# Patient Record
Sex: FEMALE | Race: WHITE | NOT HISPANIC OR LATINO | Employment: OTHER | URBAN - NONMETROPOLITAN AREA
[De-identification: names, ages, dates, MRNs, and addresses within clinical notes are randomized per-mention and may not be internally consistent; named-entity substitution may affect disease eponyms.]

---

## 2017-11-07 ENCOUNTER — HOSPITAL ENCOUNTER (EMERGENCY)
Facility: HOSPITAL | Age: 52
Discharge: HOME | End: 2017-11-07
Attending: EMERGENCY MEDICINE | Admitting: EMERGENCY MEDICINE

## 2017-11-07 ENCOUNTER — APPOINTMENT (OUTPATIENT)
Dept: GENERAL RADIOLOGY | Facility: HOSPITAL | Age: 52
End: 2017-11-07

## 2017-11-07 VITALS
WEIGHT: 155 LBS | RESPIRATION RATE: 16 BRPM | HEART RATE: 82 BPM | HEIGHT: 66 IN | DIASTOLIC BLOOD PRESSURE: 68 MMHG | OXYGEN SATURATION: 100 % | SYSTOLIC BLOOD PRESSURE: 116 MMHG | TEMPERATURE: 97.9 F | BODY MASS INDEX: 24.91 KG/M2

## 2017-11-07 DIAGNOSIS — R07.9 CHEST PAIN, UNSPECIFIED TYPE: Primary | ICD-10-CM

## 2017-11-07 LAB
ALBUMIN SERPL-MCNC: 4.6 G/DL (ref 3.5–5)
ALBUMIN/GLOB SERPL: 1.5 G/DL (ref 1.1–2.5)
ALP SERPL-CCNC: 88 U/L (ref 24–120)
ALT SERPL W P-5'-P-CCNC: 25 U/L (ref 0–54)
ANION GAP SERPL CALCULATED.3IONS-SCNC: 17 MMOL/L (ref 4–13)
APTT PPP: 30.3 SECONDS (ref 24.1–34.8)
AST SERPL-CCNC: 18 U/L (ref 7–45)
BASOPHILS # BLD AUTO: 0.01 10*3/MM3 (ref 0–0.2)
BASOPHILS NFR BLD AUTO: 0.1 % (ref 0–2)
BILIRUB SERPL-MCNC: 0.4 MG/DL (ref 0.1–1)
BUN BLD-MCNC: 16 MG/DL (ref 5–21)
BUN/CREAT SERPL: 21.9 (ref 7–25)
CALCIUM SPEC-SCNC: 10.2 MG/DL (ref 8.4–10.4)
CHLORIDE SERPL-SCNC: 106 MMOL/L (ref 98–110)
CK MB SERPL-CCNC: 0.23 NG/ML (ref 0–5)
CO2 SERPL-SCNC: 23 MMOL/L (ref 24–31)
CREAT BLD-MCNC: 0.73 MG/DL (ref 0.5–1.4)
DEPRECATED RDW RBC AUTO: 47.7 FL (ref 40–54)
EOSINOPHIL # BLD AUTO: 0.02 10*3/MM3 (ref 0–0.7)
EOSINOPHIL NFR BLD AUTO: 0.2 % (ref 0–4)
ERYTHROCYTE [DISTWIDTH] IN BLOOD BY AUTOMATED COUNT: 14.8 % (ref 12–15)
GFR SERPL CREATININE-BSD FRML MDRD: 84 ML/MIN/1.73
GLOBULIN UR ELPH-MCNC: 3 GM/DL
GLUCOSE BLD-MCNC: 113 MG/DL (ref 70–100)
HCT VFR BLD AUTO: 43.4 % (ref 37–47)
HGB BLD-MCNC: 14.4 G/DL (ref 12–16)
HOLD SPECIMEN: NORMAL
HOLD SPECIMEN: NORMAL
IMM GRANULOCYTES # BLD: 0.03 10*3/MM3 (ref 0–0.03)
IMM GRANULOCYTES NFR BLD: 0.3 % (ref 0–5)
INR PPP: 0.89 (ref 0.91–1.09)
LYMPHOCYTES # BLD AUTO: 1.61 10*3/MM3 (ref 0.72–4.86)
LYMPHOCYTES NFR BLD AUTO: 13.8 % (ref 15–45)
MCH RBC QN AUTO: 29.5 PG (ref 28–32)
MCHC RBC AUTO-ENTMCNC: 33.2 G/DL (ref 33–36)
MCV RBC AUTO: 88.9 FL (ref 82–98)
MONOCYTES # BLD AUTO: 0.49 10*3/MM3 (ref 0.19–1.3)
MONOCYTES NFR BLD AUTO: 4.2 % (ref 4–12)
NEUTROPHILS # BLD AUTO: 9.53 10*3/MM3 (ref 1.87–8.4)
NEUTROPHILS NFR BLD AUTO: 81.4 % (ref 39–78)
NT-PROBNP SERPL-MCNC: 52.9 PG/ML (ref 0–900)
PLATELET # BLD AUTO: 224 10*3/MM3 (ref 130–400)
PMV BLD AUTO: 12.9 FL (ref 6–12)
POTASSIUM BLD-SCNC: 3.6 MMOL/L (ref 3.5–5.3)
PROT SERPL-MCNC: 7.6 G/DL (ref 6.3–8.7)
PROTHROMBIN TIME: 12.3 SECONDS (ref 11.9–14.6)
RBC # BLD AUTO: 4.88 10*6/MM3 (ref 4.2–5.4)
SODIUM BLD-SCNC: 146 MMOL/L (ref 135–145)
TROPONIN I SERPL-MCNC: <0.012 NG/ML (ref 0–0.03)
WBC NRBC COR # BLD: 11.69 10*3/MM3 (ref 4.8–10.8)
WHOLE BLOOD HOLD SPECIMEN: NORMAL
WHOLE BLOOD HOLD SPECIMEN: NORMAL

## 2017-11-07 PROCEDURE — 85730 THROMBOPLASTIN TIME PARTIAL: CPT | Performed by: EMERGENCY MEDICINE

## 2017-11-07 PROCEDURE — 93010 ELECTROCARDIOGRAM REPORT: CPT | Performed by: INTERNAL MEDICINE

## 2017-11-07 PROCEDURE — 83880 ASSAY OF NATRIURETIC PEPTIDE: CPT | Performed by: EMERGENCY MEDICINE

## 2017-11-07 PROCEDURE — 84484 ASSAY OF TROPONIN QUANT: CPT | Performed by: EMERGENCY MEDICINE

## 2017-11-07 PROCEDURE — 85610 PROTHROMBIN TIME: CPT | Performed by: EMERGENCY MEDICINE

## 2017-11-07 PROCEDURE — 82553 CREATINE MB FRACTION: CPT | Performed by: EMERGENCY MEDICINE

## 2017-11-07 PROCEDURE — 93005 ELECTROCARDIOGRAM TRACING: CPT

## 2017-11-07 PROCEDURE — 71010 HC CHEST PA OR AP: CPT

## 2017-11-07 PROCEDURE — 80053 COMPREHEN METABOLIC PANEL: CPT | Performed by: EMERGENCY MEDICINE

## 2017-11-07 PROCEDURE — 99284 EMERGENCY DEPT VISIT MOD MDM: CPT

## 2017-11-07 PROCEDURE — 85025 COMPLETE CBC W/AUTO DIFF WBC: CPT | Performed by: EMERGENCY MEDICINE

## 2017-11-07 RX ORDER — AZITHROMYCIN 250 MG/1
250 TABLET, FILM COATED ORAL DAILY
COMMUNITY
End: 2017-11-11 | Stop reason: HOSPADM

## 2017-11-07 RX ORDER — SODIUM CHLORIDE 0.9 % (FLUSH) 0.9 %
10 SYRINGE (ML) INJECTION AS NEEDED
Status: DISCONTINUED | OUTPATIENT
Start: 2017-11-07 | End: 2017-11-07 | Stop reason: HOSPADM

## 2017-11-07 NOTE — ED PROVIDER NOTES
Subjective   HPI Comments: Complains of chest pain for the past 4-5 days.  It has waxed and waned during that time.  Is been no shortness of breath or diaphoresis or nausea and vomiting.  She felt like it was musculoskeletal due to having more work lately and bent over the desk a lot.  She also has had some coughing and congestion.  She saw her primary care physician yesterday and was treated for the cough and congestion but was told to come back Thursday and they would do a complete cardiac workup.  This had her scared and when she woke up again this morning and had some more pain in her left chest she decided she should be checked out.  She also has had some pain in her upper abdomen that she thinks is related to her normal ulcer disease.    Patient is a 52 y.o. female presenting with chest pain.   History provided by:  Patient   used: No    Chest Pain   Pain location:  L chest  Pain quality: dull    Pain radiates to:  Does not radiate  Pain severity:  Moderate  Onset quality:  Gradual  Duration:  5 days  Timing:  Intermittent  Progression:  Waxing and waning  Chronicity:  New  Context: not breathing, not drug use, not eating, not intercourse, not lifting, not movement, not raising an arm, not at rest, not stress and not trauma    Relieved by:  Nothing  Worsened by:  Nothing  Ineffective treatments:  None tried  Associated symptoms: cough    Associated symptoms: no abdominal pain, no AICD problem, no altered mental status, no anorexia, no anxiety, no back pain, no claudication, no dizziness, no dysphagia, no fatigue, no fever, no headache, no heartburn, no lower extremity edema, no nausea, no numbness, no orthopnea, no palpitations, no PND, no shortness of breath, no syncope, no vomiting and no weakness    Risk factors: smoking    Risk factors: no aortic disease, no birth control, no coronary artery disease, no diabetes mellitus, no Jessica-Danlos syndrome, no high cholesterol, no hypertension,  no immobilization, not male, no Marfan's syndrome, not obese, not pregnant, no prior DVT/PE and no surgery        Review of Systems   Constitutional: Negative.  Negative for fatigue and fever.   HENT: Negative.  Negative for trouble swallowing.    Respiratory: Positive for cough. Negative for shortness of breath.    Cardiovascular: Positive for chest pain. Negative for palpitations, orthopnea, claudication, syncope and PND.   Gastrointestinal: Negative.  Negative for abdominal pain, anorexia, heartburn, nausea and vomiting.   Genitourinary: Negative.    Musculoskeletal: Negative.  Negative for back pain.   Skin: Negative.    Neurological: Negative.  Negative for dizziness, weakness, numbness and headaches.   Hematological: Negative.    Psychiatric/Behavioral: Negative.    All other systems reviewed and are negative.      History reviewed. No pertinent past medical history.    No Known Allergies    Past Surgical History:   Procedure Laterality Date   • APPENDECTOMY     • HYSTERECTOMY     • TONSILLECTOMY         History reviewed. No pertinent family history.    Social History     Social History   • Marital status:      Spouse name: N/A   • Number of children: N/A   • Years of education: N/A     Social History Main Topics   • Smoking status: Current Every Day Smoker     Packs/day: 1.00   • Smokeless tobacco: None   • Alcohol use No   • Drug use: No   • Sexual activity: Defer     Other Topics Concern   • None     Social History Narrative   • None       Prior to Admission medications    Medication Sig Start Date End Date Taking? Authorizing Provider   azithromycin (ZITHROMAX) 250 MG tablet Take 250 mg by mouth Daily. Take 2 tablets the first day, then 1 tablet daily for 4 days.   Yes Historical Provider, MD   brompheniramine-phenylephrine-DM 2.5-1-5 MG/5ML elixir elixir Take 10 mL by mouth.   Yes Historical Provider, MD   mesalamine (PENTASA) 250 MG CR capsule Take 250 mg by mouth 4 (Four) Times a Day.   Yes  Historical Provider, MD       Medications   sodium chloride 0.9 % flush 10 mL (not administered)       Vitals:    11/07/17 0700   BP: 116/68   Pulse: 82   Resp: 16   Temp: 97.9 °F (36.6 °C)   SpO2: 100%         Objective   Physical Exam   Constitutional: She is oriented to person, place, and time. She appears well-developed and well-nourished.   HENT:   Head: Normocephalic and atraumatic.   Cardiovascular: Normal rate and regular rhythm.    Pulmonary/Chest: Effort normal and breath sounds normal.   Abdominal: Soft. Bowel sounds are normal.   Musculoskeletal: Normal range of motion.   Neurological: She is alert and oriented to person, place, and time.   Skin: Skin is warm and dry.   Psychiatric: She has a normal mood and affect. Her behavior is normal.   Nursing note and vitals reviewed.      Procedures         Lab Results (last 24 hours)     Procedure Component Value Units Date/Time    CBC & Differential [[id number]] Collected:  11/07/17 0525    Specimen:  Blood Updated:  11/07/17 0536    Narrative:       The following orders were created for panel order CBC & Differential.  Procedure                               Abnormality         Status                     ---------                               -----------         ------                     CBC Auto Differential[[id number]]         Abnormal            Final result                 Please view results for these tests on the individual orders.    CBC Auto Differential [[id number]]  (Abnormal) Collected:  11/07/17 0525    Specimen:  Blood Updated:  11/07/17 0536     WBC 11.69 (H) 10*3/mm3      RBC 4.88 10*6/mm3      Hemoglobin 14.4 g/dL      Hematocrit 43.4 %      MCV 88.9 fL      MCH 29.5 pg      MCHC 33.2 g/dL      RDW 14.8 %      RDW-SD 47.7 fl      MPV 12.9 (H) fL      Platelets 224 10*3/mm3      Neutrophil % 81.4 (H) %      Lymphocyte % 13.8 (L) %      Monocyte % 4.2 %      Eosinophil % 0.2 %      Basophil % 0.1 %      Immature Grans % 0.3 %      Neutrophils,  Absolute 9.53 (H) 10*3/mm3      Lymphocytes, Absolute 1.61 10*3/mm3      Monocytes, Absolute 0.49 10*3/mm3      Eosinophils, Absolute 0.02 10*3/mm3      Basophils, Absolute 0.01 10*3/mm3      Immature Grans, Absolute 0.03 10*3/mm3     aPTT [[id number]]  (Normal) Collected:  11/07/17 0526    Specimen:  Blood Updated:  11/07/17 0544     PTT 30.3 seconds     BNP [[id number]]  (Normal) Collected:  11/07/17 0526    Specimen:  Blood Updated:  11/07/17 0603     proBNP 52.9 pg/mL     CK-MB [[id number]]  (Normal) Collected:  11/07/17 0526    Specimen:  Blood Updated:  11/07/17 0603     CKMB 0.23 ng/mL     Narrative:       CKMB Index not indicated    Comprehensive Metabolic Panel [[id number]]  (Abnormal) Collected:  11/07/17 0526    Specimen:  Blood Updated:  11/07/17 0551     Glucose 113 (H) mg/dL      BUN 16 mg/dL      Creatinine 0.73 mg/dL      Sodium 146 (H) mmol/L      Potassium 3.6 mmol/L      Chloride 106 mmol/L      CO2 23.0 (L) mmol/L      Calcium 10.2 mg/dL      Total Protein 7.6 g/dL      Albumin 4.60 g/dL      ALT (SGPT) 25 U/L      AST (SGOT) 18 U/L      Alkaline Phosphatase 88 U/L      Total Bilirubin 0.4 mg/dL      eGFR Non African Amer 84 mL/min/1.73      Globulin 3.0 gm/dL      A/G Ratio 1.5 g/dL      BUN/Creatinine Ratio 21.9     Anion Gap 17.0 (H) mmol/L     Troponin [[id number]]  (Normal) Collected:  11/07/17 0526    Specimen:  Blood Updated:  11/07/17 0603     Troponin I <0.012 ng/mL     Protime-INR [[id number]]  (Abnormal) Collected:  11/07/17 0526    Specimen:  Blood Updated:  11/07/17 0544     Protime 12.3 Seconds      INR 0.89 (L)          XR Chest 1 View    (Results Pending)       ED Course  ED Course   Value Comment By Time   ECG 12 Lead Normal sinus rhythm with a rate of 71, normal axis, no acute ST elevations or depressions. Axel Galarza MD 11/07 0552    I told the patient all of her testing here today was normal.  I think this is probably musculoskeletal but I could not be 100% certain.  I offered to  keep her for second set of enzymes and a stress test but she does not want to wait.  She is pain-free now stable and discharged in stable condition. Tyrone Mccord Jr., MD 11/07 0729         HEART Score (for prediction of 6-week risk of major adverse cardiac event) reviewed and/or performed as part of the patient evaluation and treatment planning process.  The result associated with this review/performance is: 1      MDM  Number of Diagnoses or Management Options  Chest pain, unspecified type: new and requires workup     Amount and/or Complexity of Data Reviewed  Clinical lab tests: ordered and reviewed  Tests in the radiology section of CPT®: ordered and reviewed  Tests in the medicine section of CPT®: reviewed and ordered    Risk of Complications, Morbidity, and/or Mortality  Presenting problems: moderate  Diagnostic procedures: moderate  Management options: moderate    Patient Progress  Patient progress: stable      Final diagnoses:   Chest pain, unspecified type          Tyrone Mccord Jr., MD  11/07/17 0783

## 2017-11-11 ENCOUNTER — APPOINTMENT (OUTPATIENT)
Dept: CARDIOLOGY | Facility: HOSPITAL | Age: 52
End: 2017-11-11
Attending: INTERNAL MEDICINE

## 2017-11-11 ENCOUNTER — HOSPITAL ENCOUNTER (OUTPATIENT)
Facility: HOSPITAL | Age: 52
Setting detail: OBSERVATION
Discharge: HOME | End: 2017-11-11
Attending: FAMILY MEDICINE | Admitting: FAMILY MEDICINE

## 2017-11-11 ENCOUNTER — APPOINTMENT (OUTPATIENT)
Dept: GENERAL RADIOLOGY | Facility: HOSPITAL | Age: 52
End: 2017-11-11

## 2017-11-11 VITALS
HEART RATE: 64 BPM | TEMPERATURE: 99.4 F | WEIGHT: 151 LBS | DIASTOLIC BLOOD PRESSURE: 60 MMHG | SYSTOLIC BLOOD PRESSURE: 102 MMHG | RESPIRATION RATE: 18 BRPM | OXYGEN SATURATION: 98 % | BODY MASS INDEX: 24.27 KG/M2 | HEIGHT: 66 IN

## 2017-11-11 DIAGNOSIS — R07.9 CHEST PAIN, UNSPECIFIED TYPE: Primary | ICD-10-CM

## 2017-11-11 DIAGNOSIS — N30.00 ACUTE CYSTITIS WITHOUT HEMATURIA: ICD-10-CM

## 2017-11-11 PROBLEM — F43.21 GRIEF: Status: ACTIVE | Noted: 2017-11-11

## 2017-11-11 PROBLEM — F17.210 CIGARETTE NICOTINE DEPENDENCE: Status: ACTIVE | Noted: 2017-11-11

## 2017-11-11 PROBLEM — F41.9 ANXIETY: Status: ACTIVE | Noted: 2017-11-11

## 2017-11-11 PROBLEM — Z87.19 HISTORY OF GASTRITIS: Status: ACTIVE | Noted: 2017-11-11

## 2017-11-11 LAB
ALBUMIN SERPL-MCNC: 4.4 G/DL (ref 3.5–5)
ALBUMIN/GLOB SERPL: 1.6 G/DL (ref 1.1–2.5)
ALP SERPL-CCNC: 96 U/L (ref 24–120)
ALT SERPL W P-5'-P-CCNC: 30 U/L (ref 0–54)
AMPHET+METHAMPHET UR QL: NEGATIVE
AMYLASE SERPL-CCNC: 35 U/L (ref 30–110)
ANION GAP SERPL CALCULATED.3IONS-SCNC: 12 MMOL/L (ref 4–13)
APTT PPP: 29.1 SECONDS (ref 24.1–34.8)
AST SERPL-CCNC: 15 U/L (ref 7–45)
BACTERIA UR QL AUTO: ABNORMAL /HPF
BARBITURATES UR QL SCN: NEGATIVE
BASOPHILS # BLD AUTO: 0.04 10*3/MM3 (ref 0–0.2)
BASOPHILS NFR BLD AUTO: 0.3 % (ref 0–2)
BENZODIAZ UR QL SCN: NEGATIVE
BH CV STRESS BP STAGE 1: NORMAL
BH CV STRESS BP STAGE 2: NORMAL
BH CV STRESS BP STAGE 3: NORMAL
BH CV STRESS DURATION MIN STAGE 1: 3
BH CV STRESS DURATION MIN STAGE 2: 3
BH CV STRESS DURATION MIN STAGE 3: 1
BH CV STRESS DURATION SEC STAGE 1: 0
BH CV STRESS DURATION SEC STAGE 2: 0
BH CV STRESS DURATION SEC STAGE 3: 11
BH CV STRESS GRADE STAGE 1: 10
BH CV STRESS GRADE STAGE 2: 12
BH CV STRESS GRADE STAGE 3: 14
BH CV STRESS HR STAGE 1: 127
BH CV STRESS HR STAGE 2: 151
BH CV STRESS HR STAGE 3: 160
BH CV STRESS METS STAGE 1: 5
BH CV STRESS METS STAGE 2: 7.5
BH CV STRESS METS STAGE 3: 10
BH CV STRESS PROTOCOL 1: NORMAL
BH CV STRESS RECOVERY BP: NORMAL MMHG
BH CV STRESS RECOVERY HR: 98 BPM
BH CV STRESS SPEED STAGE 1: 1.7
BH CV STRESS SPEED STAGE 2: 2.5
BH CV STRESS SPEED STAGE 3: 3.4
BH CV STRESS STAGE 1: 1
BH CV STRESS STAGE 2: 2
BH CV STRESS STAGE 3: 3
BILIRUB SERPL-MCNC: 0.4 MG/DL (ref 0.1–1)
BILIRUB UR QL STRIP: NEGATIVE
BUN BLD-MCNC: 19 MG/DL (ref 5–21)
BUN/CREAT SERPL: 24.1 (ref 7–25)
CALCIUM SPEC-SCNC: 9.5 MG/DL (ref 8.4–10.4)
CANNABINOIDS SERPL QL: NEGATIVE
CHLORIDE SERPL-SCNC: 107 MMOL/L (ref 98–110)
CK MB SERPL-CCNC: <0.22 NG/ML (ref 0–5)
CK MB SERPL-CCNC: <0.22 NG/ML (ref 0–5)
CK SERPL-CCNC: 23 U/L (ref 0–203)
CK SERPL-CCNC: 31 U/L (ref 0–203)
CLARITY UR: CLEAR
CO2 SERPL-SCNC: 25 MMOL/L (ref 24–31)
COCAINE UR QL: NEGATIVE
COLOR UR: YELLOW
CREAT BLD-MCNC: 0.79 MG/DL (ref 0.5–1.4)
CRP SERPL-MCNC: <0.5 MG/DL (ref 0–0.99)
D DIMER PPP FEU-MCNC: <0.22 MG/L (FEU) (ref 0–0.5)
DEPRECATED RDW RBC AUTO: 46.3 FL (ref 40–54)
DEPRECATED RDW RBC AUTO: 46.9 FL (ref 40–54)
EOSINOPHIL # BLD AUTO: 0.11 10*3/MM3 (ref 0–0.7)
EOSINOPHIL NFR BLD AUTO: 0.7 % (ref 0–4)
ERYTHROCYTE [DISTWIDTH] IN BLOOD BY AUTOMATED COUNT: 14.3 % (ref 12–15)
ERYTHROCYTE [DISTWIDTH] IN BLOOD BY AUTOMATED COUNT: 14.5 % (ref 12–15)
ETHANOL UR QL: <0.01 %
GFR SERPL CREATININE-BSD FRML MDRD: 77 ML/MIN/1.73
GLOBULIN UR ELPH-MCNC: 2.8 GM/DL
GLUCOSE BLD-MCNC: 110 MG/DL (ref 70–100)
GLUCOSE UR STRIP-MCNC: NEGATIVE MG/DL
HCT VFR BLD AUTO: 39.9 % (ref 37–47)
HCT VFR BLD AUTO: 42.3 % (ref 37–47)
HGB BLD-MCNC: 13.2 G/DL (ref 12–16)
HGB BLD-MCNC: 14.1 G/DL (ref 12–16)
HGB UR QL STRIP.AUTO: ABNORMAL
HOLD SPECIMEN: NORMAL
HYALINE CASTS UR QL AUTO: ABNORMAL /LPF
IMM GRANULOCYTES # BLD: 0.06 10*3/MM3 (ref 0–0.03)
IMM GRANULOCYTES NFR BLD: 0.4 % (ref 0–5)
INR PPP: 0.9 (ref 0.91–1.09)
KETONES UR QL STRIP: NEGATIVE
LEUKOCYTE ESTERASE UR QL STRIP.AUTO: ABNORMAL
LIPASE SERPL-CCNC: 46 U/L (ref 23–203)
LYMPHOCYTES # BLD AUTO: 2.14 10*3/MM3 (ref 0.72–4.86)
LYMPHOCYTES # BLD MANUAL: 2.56 10*3/MM3 (ref 0.72–4.86)
LYMPHOCYTES NFR BLD AUTO: 14.5 % (ref 15–45)
LYMPHOCYTES NFR BLD MANUAL: 26 % (ref 15–45)
LYMPHOCYTES NFR BLD MANUAL: 4 % (ref 4–12)
MAXIMAL PREDICTED HEART RATE: 170 BPM
MCH RBC QN AUTO: 29.4 PG (ref 28–32)
MCH RBC QN AUTO: 29.5 PG (ref 28–32)
MCHC RBC AUTO-ENTMCNC: 33.1 G/DL (ref 33–36)
MCHC RBC AUTO-ENTMCNC: 33.3 G/DL (ref 33–36)
MCV RBC AUTO: 88.1 FL (ref 82–98)
MCV RBC AUTO: 89.1 FL (ref 82–98)
METHADONE UR QL SCN: NEGATIVE
MONOCYTES # BLD AUTO: 0.39 10*3/MM3 (ref 0.19–1.3)
MONOCYTES # BLD AUTO: 0.93 10*3/MM3 (ref 0.19–1.3)
MONOCYTES NFR BLD AUTO: 6.3 % (ref 4–12)
MYOGLOBIN SERPL-MCNC: 21.8 NG/ML (ref 0–110)
NEUTROPHILS # BLD AUTO: 11.52 10*3/MM3 (ref 1.87–8.4)
NEUTROPHILS # BLD AUTO: 6.9 10*3/MM3 (ref 1.87–8.4)
NEUTROPHILS NFR BLD AUTO: 77.8 % (ref 39–78)
NEUTROPHILS NFR BLD MANUAL: 69 % (ref 39–78)
NEUTS BAND NFR BLD MANUAL: 1 % (ref 0–10)
NITRITE UR QL STRIP: POSITIVE
NT-PROBNP SERPL-MCNC: 22.2 PG/ML (ref 0–900)
OPIATES UR QL: NEGATIVE
PCP UR QL SCN: NEGATIVE
PERCENT MAX PREDICTED HR: 94.12 %
PH UR STRIP.AUTO: 6 [PH] (ref 5–8)
PLAT MORPH BLD: NORMAL
PLATELET # BLD AUTO: 209 10*3/MM3 (ref 130–400)
PLATELET # BLD AUTO: 212 10*3/MM3 (ref 130–400)
PMV BLD AUTO: 12.3 FL (ref 6–12)
PMV BLD AUTO: 12.6 FL (ref 6–12)
POTASSIUM BLD-SCNC: 3.5 MMOL/L (ref 3.5–5.3)
PROT SERPL-MCNC: 7.2 G/DL (ref 6.3–8.7)
PROT UR QL STRIP: NEGATIVE
PROTHROMBIN TIME: 12.4 SECONDS (ref 11.9–14.6)
RBC # BLD AUTO: 4.48 10*6/MM3 (ref 4.2–5.4)
RBC # BLD AUTO: 4.8 10*6/MM3 (ref 4.2–5.4)
RBC # UR: ABNORMAL /HPF
RBC MORPH BLD: NORMAL
REF LAB TEST METHOD: ABNORMAL
SODIUM BLD-SCNC: 144 MMOL/L (ref 135–145)
SP GR UR STRIP: 1.02 (ref 1–1.03)
SQUAMOUS #/AREA URNS HPF: ABNORMAL /HPF
STRESS BASELINE BP: NORMAL MMHG
STRESS BASELINE HR: 66 BPM
STRESS PERCENT HR: 111 %
STRESS POST ESTIMATED WORKLOAD: 10 METS
STRESS POST EXERCISE DUR MIN: 7 MIN
STRESS POST EXERCISE DUR SEC: 11 SEC
STRESS POST PEAK BP: NORMAL MMHG
STRESS POST PEAK HR: 160 BPM
STRESS TARGET HR: 145 BPM
TROPONIN I SERPL-MCNC: <0.012 NG/ML (ref 0–0.03)
UROBILINOGEN UR QL STRIP: ABNORMAL
WBC MORPH BLD: NORMAL
WBC NRBC COR # BLD: 14.8 10*3/MM3 (ref 4.8–10.8)
WBC NRBC COR # BLD: 9.86 10*3/MM3 (ref 4.8–10.8)
WBC UR QL AUTO: ABNORMAL /HPF

## 2017-11-11 PROCEDURE — 87088 URINE BACTERIA CULTURE: CPT | Performed by: NURSE PRACTITIONER

## 2017-11-11 PROCEDURE — 80307 DRUG TEST PRSMV CHEM ANLYZR: CPT | Performed by: NURSE PRACTITIONER

## 2017-11-11 PROCEDURE — G0378 HOSPITAL OBSERVATION PER HR: HCPCS

## 2017-11-11 PROCEDURE — 93005 ELECTROCARDIOGRAM TRACING: CPT | Performed by: NURSE PRACTITIONER

## 2017-11-11 PROCEDURE — 93017 CV STRESS TEST TRACING ONLY: CPT

## 2017-11-11 PROCEDURE — 82150 ASSAY OF AMYLASE: CPT | Performed by: NURSE PRACTITIONER

## 2017-11-11 PROCEDURE — 93010 ELECTROCARDIOGRAM REPORT: CPT | Performed by: INTERNAL MEDICINE

## 2017-11-11 PROCEDURE — 82553 CREATINE MB FRACTION: CPT | Performed by: INTERNAL MEDICINE

## 2017-11-11 PROCEDURE — 25010000002 MORPHINE SULFATE (PF) 2 MG/ML SOLUTION: Performed by: NURSE PRACTITIONER

## 2017-11-11 PROCEDURE — 87186 SC STD MICRODIL/AGAR DIL: CPT | Performed by: NURSE PRACTITIONER

## 2017-11-11 PROCEDURE — 71020 HC CHEST PA AND LATERAL: CPT

## 2017-11-11 PROCEDURE — 83880 ASSAY OF NATRIURETIC PEPTIDE: CPT | Performed by: NURSE PRACTITIONER

## 2017-11-11 PROCEDURE — 85730 THROMBOPLASTIN TIME PARTIAL: CPT | Performed by: NURSE PRACTITIONER

## 2017-11-11 PROCEDURE — 99284 EMERGENCY DEPT VISIT MOD MDM: CPT

## 2017-11-11 PROCEDURE — 85007 BL SMEAR W/DIFF WBC COUNT: CPT | Performed by: INTERNAL MEDICINE

## 2017-11-11 PROCEDURE — 86140 C-REACTIVE PROTEIN: CPT | Performed by: NURSE PRACTITIONER

## 2017-11-11 PROCEDURE — 93005 ELECTROCARDIOGRAM TRACING: CPT | Performed by: INTERNAL MEDICINE

## 2017-11-11 PROCEDURE — 85027 COMPLETE CBC AUTOMATED: CPT | Performed by: INTERNAL MEDICINE

## 2017-11-11 PROCEDURE — 82550 ASSAY OF CK (CPK): CPT | Performed by: NURSE PRACTITIONER

## 2017-11-11 PROCEDURE — 83690 ASSAY OF LIPASE: CPT | Performed by: NURSE PRACTITIONER

## 2017-11-11 PROCEDURE — 81001 URINALYSIS AUTO W/SCOPE: CPT | Performed by: NURSE PRACTITIONER

## 2017-11-11 PROCEDURE — 93018 CV STRESS TEST I&R ONLY: CPT | Performed by: INTERNAL MEDICINE

## 2017-11-11 PROCEDURE — 82550 ASSAY OF CK (CPK): CPT | Performed by: INTERNAL MEDICINE

## 2017-11-11 PROCEDURE — 85610 PROTHROMBIN TIME: CPT | Performed by: NURSE PRACTITIONER

## 2017-11-11 PROCEDURE — 84484 ASSAY OF TROPONIN QUANT: CPT | Performed by: INTERNAL MEDICINE

## 2017-11-11 PROCEDURE — 25010000002 ONDANSETRON PER 1 MG: Performed by: NURSE PRACTITIONER

## 2017-11-11 PROCEDURE — 85025 COMPLETE CBC W/AUTO DIFF WBC: CPT | Performed by: NURSE PRACTITIONER

## 2017-11-11 PROCEDURE — 96365 THER/PROPH/DIAG IV INF INIT: CPT

## 2017-11-11 PROCEDURE — 96375 TX/PRO/DX INJ NEW DRUG ADDON: CPT

## 2017-11-11 PROCEDURE — 84484 ASSAY OF TROPONIN QUANT: CPT | Performed by: NURSE PRACTITIONER

## 2017-11-11 PROCEDURE — 85379 FIBRIN DEGRADATION QUANT: CPT | Performed by: NURSE PRACTITIONER

## 2017-11-11 PROCEDURE — 80053 COMPREHEN METABOLIC PANEL: CPT | Performed by: NURSE PRACTITIONER

## 2017-11-11 PROCEDURE — 87086 URINE CULTURE/COLONY COUNT: CPT | Performed by: NURSE PRACTITIONER

## 2017-11-11 PROCEDURE — 25010000002 CEFTRIAXONE PER 250 MG: Performed by: NURSE PRACTITIONER

## 2017-11-11 PROCEDURE — 83874 ASSAY OF MYOGLOBIN: CPT | Performed by: NURSE PRACTITIONER

## 2017-11-11 PROCEDURE — 82553 CREATINE MB FRACTION: CPT | Performed by: NURSE PRACTITIONER

## 2017-11-11 RX ORDER — NALOXONE HCL 0.4 MG/ML
0.4 VIAL (ML) INJECTION
Status: DISCONTINUED | OUTPATIENT
Start: 2017-11-11 | End: 2017-11-11 | Stop reason: HOSPADM

## 2017-11-11 RX ORDER — BUPROPION HYDROCHLORIDE 150 MG/1
150 TABLET ORAL DAILY
Status: DISCONTINUED | OUTPATIENT
Start: 2017-11-11 | End: 2017-11-11 | Stop reason: HOSPADM

## 2017-11-11 RX ORDER — HYDROXYZINE HYDROCHLORIDE 25 MG/1
25 TABLET, FILM COATED ORAL 3 TIMES DAILY PRN
Qty: 10 TABLET | Refills: 1 | Status: SHIPPED | OUTPATIENT
Start: 2017-11-11 | End: 2018-02-28

## 2017-11-11 RX ORDER — SODIUM CHLORIDE 0.9 % (FLUSH) 0.9 %
10 SYRINGE (ML) INJECTION AS NEEDED
Status: DISCONTINUED | OUTPATIENT
Start: 2017-11-11 | End: 2017-11-11 | Stop reason: HOSPADM

## 2017-11-11 RX ORDER — SODIUM CHLORIDE 0.9 % (FLUSH) 0.9 %
1-10 SYRINGE (ML) INJECTION AS NEEDED
Status: DISCONTINUED | OUTPATIENT
Start: 2017-11-11 | End: 2017-11-11 | Stop reason: HOSPADM

## 2017-11-11 RX ORDER — OMEPRAZOLE 40 MG/1
40 CAPSULE, DELAYED RELEASE ORAL DAILY
Qty: 30 CAPSULE | Refills: 0 | Status: SHIPPED | OUTPATIENT
Start: 2017-11-11 | End: 2018-02-28

## 2017-11-11 RX ORDER — BUSPIRONE HYDROCHLORIDE 5 MG/1
7.5 TABLET ORAL 2 TIMES DAILY
COMMUNITY
End: 2018-02-28

## 2017-11-11 RX ORDER — BUPROPION HYDROCHLORIDE 150 MG/1
150 TABLET ORAL DAILY
COMMUNITY
End: 2018-02-28

## 2017-11-11 RX ORDER — CEFDINIR 300 MG/1
300 CAPSULE ORAL 2 TIMES DAILY
Qty: 20 CAPSULE | Refills: 0 | Status: SHIPPED | OUTPATIENT
Start: 2017-11-11 | End: 2018-02-28

## 2017-11-11 RX ORDER — ONDANSETRON 2 MG/ML
4 INJECTION INTRAMUSCULAR; INTRAVENOUS EVERY 6 HOURS PRN
Status: DISCONTINUED | OUTPATIENT
Start: 2017-11-11 | End: 2017-11-11 | Stop reason: HOSPADM

## 2017-11-11 RX ORDER — ASPIRIN 81 MG/1
81 TABLET ORAL DAILY
Status: DISCONTINUED | OUTPATIENT
Start: 2017-11-12 | End: 2017-11-11 | Stop reason: HOSPADM

## 2017-11-11 RX ORDER — MORPHINE SULFATE 2 MG/ML
2 INJECTION, SOLUTION INTRAMUSCULAR; INTRAVENOUS ONCE
Status: COMPLETED | OUTPATIENT
Start: 2017-11-11 | End: 2017-11-11

## 2017-11-11 RX ORDER — ONDANSETRON 2 MG/ML
4 INJECTION INTRAMUSCULAR; INTRAVENOUS ONCE
Status: COMPLETED | OUTPATIENT
Start: 2017-11-11 | End: 2017-11-11

## 2017-11-11 RX ORDER — ASPIRIN 81 MG/1
324 TABLET, CHEWABLE ORAL ONCE
Status: DISCONTINUED | OUTPATIENT
Start: 2017-11-11 | End: 2017-11-11 | Stop reason: HOSPADM

## 2017-11-11 RX ORDER — HYOSCYAMINE SULFATE 0.125 MG
TABLET,DISINTEGRATING ORAL EVERY 6 HOURS PRN
COMMUNITY
End: 2018-02-28

## 2017-11-11 RX ORDER — MORPHINE SULFATE 2 MG/ML
1 INJECTION, SOLUTION INTRAMUSCULAR; INTRAVENOUS EVERY 4 HOURS PRN
Status: DISCONTINUED | OUTPATIENT
Start: 2017-11-11 | End: 2017-11-11 | Stop reason: HOSPADM

## 2017-11-11 RX ORDER — BUSPIRONE HYDROCHLORIDE 5 MG/1
7.5 TABLET ORAL 2 TIMES DAILY
Status: DISCONTINUED | OUTPATIENT
Start: 2017-11-11 | End: 2017-11-11 | Stop reason: HOSPADM

## 2017-11-11 RX ADMIN — BUPROPION HYDROCHLORIDE 150 MG: 150 TABLET, FILM COATED, EXTENDED RELEASE ORAL at 08:40

## 2017-11-11 RX ADMIN — MESALAMINE 250 MG: 250 CAPSULE ORAL at 17:32

## 2017-11-11 RX ADMIN — MESALAMINE 250 MG: 250 CAPSULE ORAL at 08:40

## 2017-11-11 RX ADMIN — ONDANSETRON 4 MG: 2 INJECTION, SOLUTION INTRAMUSCULAR; INTRAVENOUS at 01:37

## 2017-11-11 RX ADMIN — MORPHINE SULFATE 2 MG: 2 INJECTION, SOLUTION INTRAMUSCULAR; INTRAVENOUS at 01:37

## 2017-11-11 RX ADMIN — CEFTRIAXONE SODIUM 1 G: 1 INJECTION, POWDER, FOR SOLUTION INTRAMUSCULAR; INTRAVENOUS at 01:43

## 2017-11-11 RX ADMIN — BUSPIRONE HYDROCHLORIDE 7.5 MG: 5 TABLET ORAL at 17:32

## 2017-11-11 RX ADMIN — MESALAMINE 250 MG: 250 CAPSULE ORAL at 12:01

## 2017-11-11 RX ADMIN — BUSPIRONE HYDROCHLORIDE 7.5 MG: 5 TABLET ORAL at 08:40

## 2017-11-11 NOTE — PLAN OF CARE
Problem: Patient Care Overview (Adult)  Goal: Plan of Care Review  Outcome: Ongoing (interventions implemented as appropriate)    11/11/17 0355   Coping/Psychosocial Response Interventions   Plan Of Care Reviewed With patient   Patient Care Overview   Progress no change   Outcome Evaluation   Outcome Summary/Follow up Plan VSS, pt from ER with c/o pain to left chest and tingling from todd elbow to fingers and from todd knees to toes. trop neg, ekg neg. NPO for further testing. cont to monitor       Goal: Adult Individualization and Mutuality  Outcome: Ongoing (interventions implemented as appropriate)  Goal: Discharge Needs Assessment  Outcome: Ongoing (interventions implemented as appropriate)    Problem: Acute Coronary Syndrome (ACS) (Adult)  Goal: Signs and Symptoms of Listed Potential Problems Will be Absent or Manageable (Acute Coronary Syndrome)  Outcome: Ongoing (interventions implemented as appropriate)    11/11/17 0355   Acute Coronary Syndrome (ACS)   Problems Assessed (Acute Coronary Syndrome (ACS)) all   Problems Present (Acute Coronary Syndrome (ACS)) chest pain (angina);situational response

## 2017-11-11 NOTE — ED PROVIDER NOTES
Subjective   HPI Comments: Patient is a 52-year-old  female presents to the ER today with complaint of chest pain.  The patient reports that her symptoms have been occurring intermittently for the past week.  She states that nothing seems to bring this on.  She states that pressing on her left side of her chest seems to make this better.  The patient reports that the pain is located to her left side of her chest.  States that it is a nonradiating pain.  Patient states that she does ometime feel nauseated when this occurs however denies any vomiting.  She states that she does not feel short of breath however her friend told her that she looked short of breath earlier when she had chest pain.  Patient was recently treated last week for an upper respiratory infection and completed her last dose of Zithromax today.  The patient reports that for the past several days she has had episodes where her whole-body begins to tingle.  She states that last for a little bit and then goes away. This is associated with when the chest pain occurs. The patient was seen here on 11/07/2017.  The patient was seen at that time for the same complaints.  The patient states that at that time her whole-body begins to tingle and she began to have chest pain shortly after that happened.  Patient had a negative workup at this facility and was discharged home.  Patient did follow up with her primary care provider and was started on Wellbutrin and BuSpar yesterday.  She has taken 1 dose of this medication.  Patient states that she has been under a lot of stress lately.  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  The patient states that tonight while laying in bed at about 2230 she began to develop some left-sided nonradiating chest pain.  She reports that she had all over body tingling beginning at that time as well.  She states that the tingling has resolved  except for some slight tingling sensation to her todd LE.  Patient states that she has no chest pain at this time.  The pt denies any previous cardiac hx; denies any family hx of cardiac dx. She is a 1ppd smoker for many years. The pt denies any head injury or any numbness or extremity weakness. She denies HA or dizziness or visual changes. Patient presents to the ER today with a friend for further evaluation.      Patient is a 52 y.o. female presenting with chest pain.   History provided by:  Patient   used: No    Chest Pain   Pain location:  L chest  Pain quality: aching and dull    Pain radiates to:  Does not radiate  Pain severity:  Mild  Onset quality:  Sudden  Duration:  2 hours  Timing:  Intermittent  Progression:  Waxing and waning  Chronicity:  New  Context: at rest    Context: not breathing, not drug use, not eating, not intercourse, not lifting, not movement, not raising an arm, not stress and not trauma    Relieved by:  Nothing  Worsened by:  Nothing  Ineffective treatments:  None tried  Associated symptoms: anxiety, cough and nausea    Associated symptoms: no abdominal pain, no AICD problem, no altered mental status, no anorexia, no back pain, no claudication, no diaphoresis, no dizziness, no dysphagia, no fatigue, no fever, no headache, no heartburn, no lower extremity edema, no near-syncope, no numbness, no orthopnea, no palpitations, no PND, no shortness of breath, no syncope, no vomiting and no weakness    Risk factors: smoking    Risk factors: no aortic disease, no birth control, no coronary artery disease, no diabetes mellitus, no Jessica-Danlos syndrome, no high cholesterol, no hypertension, no immobilization, not male, no Marfan's syndrome, not obese, not pregnant, no prior DVT/PE and no surgery        Review of Systems   Constitutional: Negative for diaphoresis, fatigue and fever.   HENT: Negative for trouble swallowing.    Respiratory: Positive for cough. Negative for  shortness of breath.    Cardiovascular: Positive for chest pain. Negative for palpitations, orthopnea, claudication, syncope, PND and near-syncope.   Gastrointestinal: Positive for nausea. Negative for abdominal pain, anorexia, heartburn and vomiting.   Musculoskeletal: Negative for back pain.   Neurological: Negative for dizziness, weakness, numbness and headaches.   All other systems reviewed and are negative.      Past Medical History:   Diagnosis Date   • Gastric ulcer        No Known Allergies    Past Surgical History:   Procedure Laterality Date   • APPENDECTOMY     • HYSTERECTOMY     • TONSILLECTOMY         History reviewed. No pertinent family history.    Social History     Social History   • Marital status:      Spouse name: N/A   • Number of children: N/A   • Years of education: N/A     Social History Main Topics   • Smoking status: Current Every Day Smoker     Packs/day: 1.00   • Smokeless tobacco: None   • Alcohol use No   • Drug use: No   • Sexual activity: Defer     Other Topics Concern   • None     Social History Narrative   • None           Objective   Physical Exam   Constitutional: She is oriented to person, place, and time. She appears well-developed and well-nourished.   HENT:   Head: Normocephalic and atraumatic.   Eyes: Conjunctivae are normal. Pupils are equal, round, and reactive to light.   Neck: Normal range of motion. Neck supple.   Cardiovascular: Normal rate, regular rhythm and normal heart sounds.    Pulmonary/Chest: Effort normal and breath sounds normal.   Abdominal: Soft. Bowel sounds are normal.   Musculoskeletal: Normal range of motion.   Neurological: She is alert and oriented to person, place, and time.   Skin: Skin is warm and dry.   Psychiatric: She has a normal mood and affect.   Nursing note and vitals reviewed.      Procedures         ED Course  ED Course   Comment By Time   Pt EKG from today compared to EKG from 11/7/17; no significant changes noted. The pt has a hx  of gastric ulcers; I will defer giving ASA at this time due to this. I did offer the pt a CT scan of the head for her complaint of tingling in LE. She declines at this time; she states that she has had no head injury, does not have numbness and declines CT scan of the head. Lou Hewitt, APRN 11/11 0028   The patient is now reporting that she is having chest pain 7-10.  He does speak with the patient.  States it is located directly in the left chest area.  Nonradiating.  I ordered a repeat EKG on the patient.  I will also give the patient little bit of morphine and Zofran to see if this helps with her pain.  The patient's blood pressures alone and can certainly nitroglycerin I could drop of this even lower.  I have discussed with the pt that her lab results are normal. This is the patient's second visit to the ER in 3 days for the same complaint.  Patient reports that she did actually see her primary care provider earlier today for the same symptoms.  At this time I did offer the patient admission for further evaluation of her symptoms.  She is agreeable to this.  I am going call the patient's primary care provider to admit the patient.  The patient does have a urinary tract infection and we will treat this with Rocephin. Lou Hewitt, APRN 11/11 0123   Patient did have a repeat EKG performed due to her new onset complaint of chest pain.  There was no significant changes noted compared to her other EKGs. Lou Hewitt, APRN 11/11 0136   I have spoke with Dr. Marroquin who is covering for Dr. Murcia at this time. She has agreed to admit the pt. The pt will be admitted to telemetry for observation stay for chest pain rule out. The pt is aware that this is an observation stay and is agreeable to admission. The pt reports her CP is a 1/10 at this time. Will be admitted to telemetry at this time in stable cond. Dr. Marroquin has given orders for the pt to remain NPO and to have a repeat EKG/cardiac  enzymes 3 hours after initial set. Orders given to charge nurse Antonio Mtz RN to place in computer system. Lou Hewitt, APRN 11/11 0154   Pt  case and EKG x 2 and CXR reviewed today with Dr. Cruz who agrees with plan of care. Lou Hewitt, APRN 11/11 0209        XR Chest 2 View    (Results Pending)     Labs Reviewed   COMPREHENSIVE METABOLIC PANEL - Abnormal; Notable for the following:        Result Value    Glucose 110 (*)     All other components within normal limits   PROTIME-INR - Abnormal; Notable for the following:     INR 0.90 (*)     All other components within normal limits   URINALYSIS W/ CULTURE IF INDICATED - Abnormal; Notable for the following:     Blood, UA Trace (*)     Leuk Esterase, UA Moderate (2+) (*)     Nitrite, UA Positive (*)     All other components within normal limits   CBC WITH AUTO DIFFERENTIAL - Abnormal; Notable for the following:     WBC 14.80 (*)     MPV 12.3 (*)     Lymphocyte % 14.5 (*)     Neutrophils, Absolute 11.52 (*)     Immature Grans, Absolute 0.06 (*)     All other components within normal limits   URINALYSIS, MICROSCOPIC ONLY - Abnormal; Notable for the following:     RBC, UA 3-5 (*)     WBC, UA 13-20 (*)     Bacteria, UA 4+ (*)     Squamous Epithelial Cells, UA 7-12 (*)     All other components within normal limits   APTT - Normal   LIPASE - Normal   AMYLASE - Normal   BNP (IN-HOUSE) - Normal   D-DIMER, QUANTITATIVE - Normal    Narrative:     Reference Range is 0-0.50 mg/L FEU. However, results <0.50 mg/L FEU tends to rule out DVT or PE. Results >0.50 mg/L FEU are not useful in predicting absence or presence of DVT or PE.   TROPONIN (IN-HOUSE) - Normal   C-REACTIVE PROTEIN - Normal   ETHANOL - Normal    Narrative:     Not for legal purposes. Chain of Custody not followed.    URINE DRUG SCREEN - Normal    Narrative:     Negative Thresholds For Drugs Screened in Urine:    Amphetamines          500 ng/ml  Barbiturates          200 ng/ml  Benzodiazepines        200 ng/ml  Cocaine               150 ng/ml  Methadone             150 ng/ml  Opiates               300 ng/ml  Phencyclidine         25 ng/ml  THC                      50 ng/ml    The normal value for all drugs tested is negative. This report includes final unconfirmed screening results.  A positive result by this assay can be, at your request, sent to the Reference Lab for confirmation by gas chromatography. Unconfirmed results must not be used for non-medical purposes, such as employment or legal testing. Clinical consideration should be applied to any drug of abuse test result, particularly when unconfirmed results are used.   CK - Normal   MYOGLOBIN, SERUM - Normal   CK MB - Normal    Narrative:     CKMB Index not indicated   URINE CULTURE   RAINBOW DRAW    Narrative:     The following orders were created for panel order [place] Draw.  Procedure                               Abnormality         Status                     ---------                               -----------         ------                     Red Top[[id number]]                                          Final result                 Please view results for these tests on the individual orders.   CBC AND DIFFERENTIAL    Narrative:     The following orders were created for panel order CBC & Differential.  Procedure                               Abnormality         Status                     ---------                               -----------         ------                     CBC Auto Differential[[id number]]        Abnormal            Final result                 Please view results for these tests on the individual orders.   RED TOP             HEART Score (for prediction of 6-week risk of major adverse cardiac event) reviewed and/or performed as part of the patient evaluation and treatment planning process.  The result associated with this review/performance is: 2       MDM  Number of Diagnoses or Management Options  Acute cystitis without hematuria:  new and requires workup  Chest pain, unspecified type: new and requires workup     Amount and/or Complexity of Data Reviewed  Clinical lab tests: ordered and reviewed  Tests in the radiology section of CPT®: ordered and reviewed  Tests in the medicine section of CPT®: ordered and reviewed  Decide to obtain previous medical records or to obtain history from someone other than the patient: yes  Discuss the patient with other providers: yes    Patient Progress  Patient progress: stable      Final diagnoses:   Chest pain, unspecified type   Acute cystitis without hematuria            Lou Hewitt, SARBJIT  11/11/17 0209       Lou Hewitt, SARBJIT  11/11/17 0229

## 2017-11-11 NOTE — PROGRESS NOTES
Discharge Planning Assessment   [place]     Patient Name: Maria Victoria Aranda  MRN: [id number]  Today's Date: 11/11/2017    Admit Date: 11/11/2017          Discharge Needs Assessment       11/11/17 1545    Living Environment    Lives With spouse    Home Accessibility no concerns    Stair Railings at Home none    Type of Financial/Environmental Concern none    Transportation Available car;family or friend will provide    Living Environment    Provides Primary Care For no one    Quality Of Family Relationships supportive;helpful;involved    Able to Return to Prior Living Arrangements yes    Discharge Needs Assessment    Concerns To Be Addressed no discharge needs identified    Readmission Within The Last 30 Days no previous admission in last 30 days    Anticipated Changes Related to Illness none    Equipment Currently Used at Home none    Equipment Needed After Discharge none    Discharge Planning Comments --   Pt's PCP is Dr. Murcia.  Pt has RX coverage.            Discharge Plan     None        Discharge Placement     No information found                Demographic Summary     None            Functional Status     None            Psychosocial     None            Abuse/Neglect     None            Legal     None            Substance Abuse     None            Patient Forms     None          LOYDA Otoole

## 2017-11-11 NOTE — H&P
Internal Medicine History and Physical & Discharge Summary       Name: Maria Victoria Aranda  MRN: [id number]     Acct: [id number]  Room: [facility]/    Admit Date: 11/11/2017   Discharge Date: 11/11/2017  PCP: Paolo Murcia MD     Chief Complaint:     Chief Complaint   Patient presents with   • Chest Pain   • Tingling       Past Medical History:     Code Status:  Full Code  Past Medical History:   Diagnosis Date   • Gastric ulcer         Past Surgical History:     Past Surgical History:   Procedure Laterality Date   • APPENDECTOMY     • HYSTERECTOMY     • TONSILLECTOMY        Medications Prior to Admission:       Prior to Admission medications    Medication Sig Start Date End Date Taking? Authorizing Provider   buPROPion XL (WELLBUTRIN XL) 150 MG 24 hr tablet Take 150 mg by mouth Daily. 150 MG  ONCE A DAY FOR 7 DAYS THEN INCREASE TO 2 TABLETS DAILHY   Yes Paolo Murcia MD   busPIRone (BUSPAR) 5 MG tablet Take 7.5 mg by mouth 2 (Two) Times a Day.   Yes SARBJIT Solorio   hyoscyamine sulfate (ANASPAZ) 0.125 MG tablet dispersible disintegrating tablet Take  by mouth Every 6 (Six) Hours As Needed (HEARTBURN).   Yes Historical Provider, MD   azithromycin (ZITHROMAX) 250 MG tablet Take 250 mg by mouth Daily. Take 2 tablets the first day, then 1 tablet daily for 4 days.    Historical Provider, MD   brompheniramine-phenylephrine-DM 2.5-1-5 MG/5ML elixir elixir Take 10 mL by mouth.    Historical Provider, MD   cefdinir (OMNICEF) 300 MG capsule Take 1 capsule by mouth 2 (Two) Times a Day. 11/11/17   Car Tripp MD   hydrOXYzine (ATARAX) 25 MG tablet Take 1 tablet by mouth 3 (Three) Times a Day As Needed for Anxiety. 11/11/17   Car Tripp MD   mesalamine (PENTASA) 250 MG CR capsule Take 250 mg by mouth 4 (Four) Times a Day. PT STILL TAKES THIS MED    Historical Provider, MD   omeprazole (priLOSEC) 40 MG capsule Take 1 capsule by mouth Daily. 11/11/17   Car Tripp MD        Allergies:       Review of  "patient's allergies indicates no known allergies.    Social History:     Tobacco:    reports that she has been smoking.  She has been smoking about 1.00 pack per day. She does not have any smokeless tobacco history on file.  Alcohol:      reports that she does not drink alcohol.  Drug Use:  reports that she does not use illicit drugs.    Family History:     Family History   Problem Relation Age of Onset   • Hypertension Father    • Heart disease Paternal Grandmother        Review of Systems:     12point ROS discussed with patient and is negative except for what is reported in the HPI.    Physical Exam:     Vitals:  /60 (BP Location: Right arm, Patient Position: Lying)  Pulse 64  Temp 99.4 °F (37.4 °C) (Tympanic)   Resp 18  Ht 66\" (167.6 cm)  Wt 151 lb (68.5 kg)  SpO2 98%  BMI 24.37 kg/m2  Temp (24hrs), Av.7 °F (37.1 °C), Min:98.2 °F (36.8 °C), Max:99.4 °F (37.4 °C)    General appearance - alert, well appearing, and in no acute distress  Mental status - oriented to person, place, and time with anxious affect  Head - normocephalic and atraumatic  Eyes - pupils equal and reactive, extraocular eye movements intact, conjunctiva clear  Ears - hearing appears to be intact  Nose - no drainage noted  Mouth - mucous membranes moist  Neck - supple, no carotid bruits, thyroid not palpable  Chest - clear to auscultation, normal effort  Heart - normal rate, regular rhythm, no murmur  Abdomen - soft, nontender, nondistended, bowel sounds present all four quadrants, no masses, hepatomegaly or splenomegaly  Neurological - normal speech, no focal findings or movement disorder noted, cranial nerves II through XII grossly intact  Extremities - peripheral pulses palpable, no pedal edema or calf pain with palpation  Skin - no gross lesions, rashes, or induration noted      Discharge:     Discharge Diagnoses:    Principal Problem: Chest pain  Active Hospital Problems (** Indicates Principal Problem)    Diagnosis Date " Noted   • **Chest pain [R07.9] 11/11/2017   • Cigarette nicotine dependence [F17.210] 11/11/2017   • Acute cystitis without hematuria [N30.00] 11/11/2017   • Grief [F43.20] 11/11/2017   • Anxiety [F41.9] 11/11/2017   • History of gastritis [Z87.19] 11/11/2017      Resolved Hospital Problems    Diagnosis Date Noted Date Resolved   No resolved problems to display.       Consults: none  Significant Diagnostic Studies: exercise stress test 11/11    Hospital Course:   Maria Victoria Aranda is a 52 y.o. female was admitted on 11/11/2017 with chest pain.  Started about 1week ago with pain over her left breast which comes & goes.  No inciting factors.  She has not had MMG in last 4y.  No FH breast CA.  Patient had recent URI treated with azithromycin.  She does have notable stress.  Her mother in law with whom she was rather close passed away end of August with pancreatic cancer.  She does not know if she really fully grieved with this experience.  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  She was recently started on bupropion & buspirone.  No SI/HI.  Does feel very panicky.  No manic episodes.  She does have a history of gastric ulcer with last EGD about 4y ago.  Currently not on PPI.  No abd pain, vomiting, or blood in stool.  She is a current PPD cigarette smoker for last 20years.  No HTN, HLD, DM, or strong FH CAD.  She was admitted for further evaluation of her chest pain.      The patient was continued on her home medications for her stable chronic medical conditions.  She was also started on antibiotics empirically for presumed urinary tract infection.  She was monitored with serial cardiac exams and via telemetry.  Serial troponins were in the normal range.  Patient underwent further cardiac evaluation with treadmill stress test on 11/11.  Results showed no evidence of myocardial ischemia.  Patient had no further episodes of chest pain during her hospitalization.  On day of discharge, patient was  pleased with her progress and comfortable with continuing her care at home.  She was advised to quit smoking and work on coping with stress as able.  Prescriptions for proton pump inhibitor and limited hydroxyzine were sent to the pharmacy.      Discharge Medications:       Maria Victoria Aranda   Home Medication Instructions EVER:[id number]    Printed on:11/11/17 1966   Medication Information                      brompheniramine-phenylephrine-DM 2.5-1-5 MG/5ML elixir elixir  Take 10 mL by mouth.             buPROPion XL (WELLBUTRIN XL) 150 MG 24 hr tablet  Take 150 mg by mouth Daily. 150 MG  ONCE A DAY FOR 7 DAYS THEN INCREASE TO 2 TABLETS DAILHY             busPIRone (BUSPAR) 5 MG tablet  Take 7.5 mg by mouth 2 (Two) Times a Day.             cefdinir (OMNICEF) 300 MG capsule  Take 1 capsule by mouth 2 (Two) Times a Day.             hydrOXYzine (ATARAX) 25 MG tablet  Take 1 tablet by mouth 3 (Three) Times a Day As Needed for Anxiety.             hyoscyamine sulfate (ANASPAZ) 0.125 MG tablet dispersible disintegrating tablet  Take  by mouth Every 6 (Six) Hours As Needed (HEARTBURN).             mesalamine (PENTASA) 250 MG CR capsule  Take 250 mg by mouth 4 (Four) Times a Day. PT STILL TAKES THIS MED             omeprazole (priLOSEC) 40 MG capsule  Take 1 capsule by mouth Daily.                 Discharge Diet:  regular  Discharge Activity:  increase to prior levels as tolerated    Disposition: to home  Follow up with Paolo Murcai MD within 1 week.  Cultures to be followed up on.  Also due for MMG.    >30minutes was spent in the discharge planning and coordination process.    Signed:  Car Tripp MD  11/11/2017, 6:13 PM     Copy to be sent to Dr. Paolo Murcia MD

## 2017-11-14 ENCOUNTER — TRANSCRIBE ORDERS (OUTPATIENT)
Dept: ADMINISTRATIVE | Facility: HOSPITAL | Age: 52
End: 2017-11-14

## 2017-11-14 DIAGNOSIS — R00.2 PALPITATIONS: ICD-10-CM

## 2017-11-14 DIAGNOSIS — R07.9 CHEST PAIN, UNSPECIFIED TYPE: Primary | ICD-10-CM

## 2017-11-14 DIAGNOSIS — R06.02 SHORTNESS OF BREATH: ICD-10-CM

## 2017-11-14 DIAGNOSIS — K21.9 GASTRO-ESOPHAGEAL REFLUX DISEASE WITHOUT ESOPHAGITIS: ICD-10-CM

## 2017-11-14 LAB
BACTERIA SPEC AEROBE CULT: ABNORMAL
BACTERIA SPEC AEROBE CULT: ABNORMAL

## 2017-12-12 ENCOUNTER — HOSPITAL ENCOUNTER (OUTPATIENT)
Dept: GENERAL RADIOLOGY | Facility: HOSPITAL | Age: 52
Discharge: HOME | End: 2017-12-12
Attending: FAMILY MEDICINE | Admitting: FAMILY MEDICINE

## 2017-12-12 DIAGNOSIS — R00.2 PALPITATIONS: ICD-10-CM

## 2017-12-12 DIAGNOSIS — R07.9 CHEST PAIN, UNSPECIFIED TYPE: ICD-10-CM

## 2017-12-12 DIAGNOSIS — K21.9 GASTRO-ESOPHAGEAL REFLUX DISEASE WITHOUT ESOPHAGITIS: ICD-10-CM

## 2017-12-12 DIAGNOSIS — R06.02 SHORTNESS OF BREATH: ICD-10-CM

## 2017-12-12 PROCEDURE — 74246 X-RAY XM UPR GI TRC 2CNTRST: CPT

## 2017-12-12 RX ADMIN — ANTACID/ANTIFLATULENT 1 TABLET: 380; 550; 10; 10 GRANULE, EFFERVESCENT ORAL at 09:39

## 2017-12-12 RX ADMIN — BARIUM SULFATE 60 ML: 980 POWDER, FOR SUSPENSION ORAL at 09:37

## 2017-12-12 RX ADMIN — BARIUM SULFATE 700 MG: 700 TABLET ORAL at 09:35

## 2017-12-12 RX ADMIN — BARIUM SULFATE 90 ML: 960 POWDER, FOR SUSPENSION ORAL at 09:36

## 2018-02-28 ENCOUNTER — OFFICE VISIT (OUTPATIENT)
Dept: OBSTETRICS AND GYNECOLOGY | Facility: CLINIC | Age: 53
End: 2018-02-28

## 2018-02-28 VITALS
SYSTOLIC BLOOD PRESSURE: 108 MMHG | HEIGHT: 66 IN | WEIGHT: 152 LBS | BODY MASS INDEX: 24.43 KG/M2 | DIASTOLIC BLOOD PRESSURE: 68 MMHG

## 2018-02-28 DIAGNOSIS — N30.00 ACUTE CYSTITIS WITHOUT HEMATURIA: ICD-10-CM

## 2018-02-28 DIAGNOSIS — Z12.39 SCREENING FOR MALIGNANT NEOPLASM OF BREAST: ICD-10-CM

## 2018-02-28 DIAGNOSIS — Z01.419 WELL WOMAN EXAM WITH ROUTINE GYNECOLOGICAL EXAM: Primary | ICD-10-CM

## 2018-02-28 DIAGNOSIS — R82.90 ABNORMAL URINE ODOR: ICD-10-CM

## 2018-02-28 LAB
BILIRUB BLD-MCNC: NEGATIVE MG/DL
CLARITY, POC: CLEAR
COLOR UR: YELLOW
GLUCOSE UR STRIP-MCNC: NEGATIVE MG/DL
KETONES UR QL: NEGATIVE
LEUKOCYTE EST, POC: ABNORMAL
NITRITE UR-MCNC: POSITIVE MG/ML
PH UR: 5.5 [PH] (ref 5–8)
PROT UR STRIP-MCNC: NEGATIVE MG/DL
RBC # UR STRIP: NEGATIVE /UL
SP GR UR: 1 (ref 1–1.03)
UROBILINOGEN UR QL: NORMAL

## 2018-02-28 PROCEDURE — 99396 PREV VISIT EST AGE 40-64: CPT | Performed by: NURSE PRACTITIONER

## 2018-02-28 PROCEDURE — 81002 URINALYSIS NONAUTO W/O SCOPE: CPT | Performed by: NURSE PRACTITIONER

## 2018-02-28 RX ORDER — SUCRALFATE 1 G/1
TABLET ORAL
COMMUNITY
Start: 2018-02-14 | End: 2019-03-01

## 2018-02-28 RX ORDER — MESALAMINE 500 MG/1
2000 CAPSULE ORAL 2 TIMES DAILY
COMMUNITY
Start: 2018-02-01 | End: 2019-06-10 | Stop reason: SDUPTHER

## 2018-02-28 RX ORDER — LANSOPRAZOLE 30 MG/1
30 CAPSULE, DELAYED RELEASE ORAL DAILY
COMMUNITY
End: 2019-01-03

## 2018-02-28 RX ORDER — NITROFURANTOIN 25; 75 MG/1; MG/1
100 CAPSULE ORAL 2 TIMES DAILY
Qty: 6 CAPSULE | Refills: 0 | Status: SHIPPED | OUTPATIENT
Start: 2018-02-28 | End: 2019-01-03

## 2018-02-28 RX ORDER — AMITRIPTYLINE HYDROCHLORIDE 25 MG/1
TABLET, FILM COATED ORAL
COMMUNITY
Start: 2018-01-26 | End: 2019-01-03

## 2018-02-28 NOTE — PATIENT INSTRUCTIONS
Steps to Quit Smoking  Smoking tobacco can be harmful to your health and can affect almost every organ in your body. Smoking puts you, and those around you, at risk for developing many serious chronic diseases. Quitting smoking is difficult, but it is one of the best things that you can do for your health. It is never too late to quit.  What are the benefits of quitting smoking?  When you quit smoking, you lower your risk of developing serious diseases and conditions, such as:  · Lung cancer or lung disease, such as COPD.  · Heart disease.  · Stroke.  · Heart attack.  · Infertility.  · Osteoporosis and bone fractures.  Additionally, symptoms such as coughing, wheezing, and shortness of breath may get better when you quit. You may also find that you get sick less often because your body is stronger at fighting off colds and infections. If you are pregnant, quitting smoking can help to reduce your chances of having a baby of low birth weight.  How do I get ready to quit?  When you decide to quit smoking, create a plan to make sure that you are successful. Before you quit:  · Pick a date to quit. Set a date within the next two weeks to give you time to prepare.  · Write down the reasons why you are quitting. Keep this list in places where you will see it often, such as on your bathroom mirror or in your car or wallet.  · Identify the people, places, things, and activities that make you want to smoke (triggers) and avoid them. Make sure to take these actions:  ¨ Throw away all cigarettes at home, at work, and in your car.  ¨ Throw away smoking accessories, such as ashtrays and lighters.  ¨ Clean your car and make sure to empty the ashtray.  ¨ Clean your home, including curtains and carpets.  · Tell your family, friends, and coworkers that you are quitting. Support from your loved ones can make quitting easier.  · Talk with your health care provider about your options for quitting smoking.  · Find out what treatment  options are covered by your health insurance.  What strategies can I use to quit smoking?  Talk with your healthcare provider about different strategies to quit smoking. Some strategies include:  · Quitting smoking altogether instead of gradually lessening how much you smoke over a period of time. Research shows that quitting “cold turkey” is more successful than gradually quitting.  · Attending in-person counseling to help you build problem-solving skills. You are more likely to have success in quitting if you attend several counseling sessions. Even short sessions of 10 minutes can be effective.  · Finding resources and support systems that can help you to quit smoking and remain smoke-free after you quit. These resources are most helpful when you use them often. They can include:  ¨ Online chats with a counselor.  ¨ Telephone quitlines.  ¨ Printed self-help materials.  ¨ Support groups or group counseling.  ¨ Text messaging programs.  ¨ Mobile phone applications.  · Taking medicines to help you quit smoking. (If you are pregnant or breastfeeding, talk with your health care provider first.) Some medicines contain nicotine and some do not. Both types of medicines help with cravings, but the medicines that include nicotine help to relieve withdrawal symptoms. Your health care provider may recommend:  ¨ Nicotine patches, gum, or lozenges.  ¨ Nicotine inhalers or sprays.  ¨ Non-nicotine medicine that is taken by mouth.  Talk with your health care provider about combining strategies, such as taking medicines while you are also receiving in-person counseling. Using these two strategies together makes you more likely to succeed in quitting than if you used either strategy on its own.  If you are pregnant or breastfeeding, talk with your health care provider about finding counseling or other support strategies to quit smoking. Do not take medicine to help you quit smoking unless told to do so by your health care  provider.  What things can I do to make it easier to quit?  Quitting smoking might feel overwhelming at first, but there is a lot that you can do to make it easier. Take these important actions:  · Reach out to your family and friends and ask that they support and encourage you during this time. Call telephone quitlines, reach out to support groups, or work with a counselor for support.  · Ask people who smoke to avoid smoking around you.  · Avoid places that trigger you to smoke, such as bars, parties, or smoke-break areas at work.  · Spend time around people who do not smoke.  · Lessen stress in your life, because stress can be a smoking trigger for some people. To lessen stress, try:  ¨ Exercising regularly.  ¨ Deep-breathing exercises.  ¨ Yoga.  ¨ Meditating.  ¨ Performing a body scan. This involves closing your eyes, scanning your body from head to toe, and noticing which parts of your body are particularly tense. Purposefully relax the muscles in those areas.  · Download or purchase mobile phone or tablet apps (applications) that can help you stick to your quit plan by providing reminders, tips, and encouragement. There are many free apps, such as QuitGuide from the CDC (Centers for Disease Control and Prevention). You can find other support for quitting smoking (smoking cessation) through smokefree.gov and other websites.  How will I feel when I quit smoking?  Within the first 24 hours of quitting smoking, you may start to feel some withdrawal symptoms. These symptoms are usually most noticeable 2-3 days after quitting, but they usually do not last beyond 2-3 weeks. Changes or symptoms that you might experience include:  · Mood swings.  · Restlessness, anxiety, or irritation.  · Difficulty concentrating.  · Dizziness.  · Strong cravings for sugary foods in addition to nicotine.  · Mild weight gain.  · Constipation.  · Nausea.  · Coughing or a sore throat.  · Changes in how your medicines work in your  body.  · A depressed mood.  · Difficulty sleeping (insomnia).  After the first 2-3 weeks of quitting, you may start to notice more positive results, such as:  · Improved sense of smell and taste.  · Decreased coughing and sore throat.  · Slower heart rate.  · Lower blood pressure.  · Clearer skin.  · The ability to breathe more easily.  · Fewer sick days.  Quitting smoking is very challenging for most people. Do not get discouraged if you are not successful the first time. Some people need to make many attempts to quit before they achieve long-term success. Do your best to stick to your quit plan, and talk with your health care provider if you have any questions or concerns.  This information is not intended to replace advice given to you by your health care provider. Make sure you discuss any questions you have with your health care provider.  Document Released: 12/12/2002 Document Revised: 08/15/2017 Document Reviewed: 05/03/2016  [employer] Interactive Patient Education © 2017 [employer]

## 2018-02-28 NOTE — PROGRESS NOTES
Subjective   Maria Victoria Aranda is a 53 y.o. female  YOB: 1965    Est PT is here today for yearly visit.  Pt states that she is doing good with no c/o  Pt does need order for Mammo today as well      Chief Complaint   Patient presents with   • Gynecologic Exam     New patient to practice, here for annual exam. Had hysterectomy and ovaries removed for PID. Needs mammogram.  C/O some ongoing breast pain on the left breast. Had UTI recently and wants to make sure it is gone.  Urine for sample obtained.        HPI    The following portions of the patient's history were reviewed and updated as appropriate: allergies, current medications, past family history, past medical history, past social history, past surgical history and problem list.    No Known Allergies    Past Medical History:   Diagnosis Date   • Esophagitis determined by endoscopy        Family History   Problem Relation Age of Onset   • Hypertension Father    • Heart disease Paternal Grandmother    • Colon cancer Mother    • No Known Problems Sister    • Breast cancer Neg Hx    • Ovarian cancer Neg Hx        Social History     Social History   • Marital status:      Spouse name: N/A   • Number of children: N/A   • Years of education: N/A     Occupational History   • Not on file.     Social History Main Topics   • Smoking status: Current Every Day Smoker     Packs/day: 1.00   • Smokeless tobacco: Never Used   • Alcohol use No   • Drug use: No   • Sexual activity: Yes     Birth control/ protection: Surgical     Other Topics Concern   • Not on file     Social History Narrative         Current Outpatient Prescriptions:   •  amitriptyline (ELAVIL) 25 MG tablet, , Disp: , Rfl:   •  lansoprazole (PREVACID) 30 MG capsule, Take 30 mg by mouth Daily., Disp: , Rfl:   •  PENTASA 500 MG CR capsule, , Disp: , Rfl:   •  sucralfate (CARAFATE) 1 g tablet, , Disp: , Rfl:   •  nitrofurantoin, macrocrystal-monohydrate, (MACROBID) 100 MG capsule, Take 1 capsule  by mouth 2 (Two) Times a Day., Disp: 6 capsule, Rfl: 0    Menstrual History:  OB History      Para Term  AB Living    3 0 0 0 1 2    SAB TAB Ectopic Multiple Live Births    1 0 0 0 2           No LMP recorded. Patient has had a hysterectomy.    Sexual History:         Could not be calculated    Past Surgical History:   Procedure Laterality Date   • APPENDECTOMY     • HAND TENDON SURGERY     • HYSTERECTOMY     • TONSILLECTOMY         Review of Systems   Constitutional: Negative for activity change, appetite change, chills, diaphoresis, fatigue, fever and unexpected weight change.   HENT: Negative for congestion, dental problem, drooling, ear discharge, ear pain, facial swelling, hearing loss, mouth sores, nosebleeds, postnasal drip, rhinorrhea, sinus pain, sinus pressure, sneezing, sore throat, tinnitus, trouble swallowing and voice change.    Eyes: Negative for photophobia, pain, discharge, redness, itching and visual disturbance.   Respiratory: Negative for apnea, cough, choking, chest tightness, shortness of breath, wheezing and stridor.    Cardiovascular: Negative for chest pain, palpitations and leg swelling.   Gastrointestinal: Negative for abdominal distention, abdominal pain, anal bleeding, blood in stool, constipation, diarrhea, nausea, rectal pain and vomiting.   Endocrine: Negative for cold intolerance, heat intolerance, polydipsia, polyphagia and polyuria.   Genitourinary: Negative for decreased urine volume, difficulty urinating, dyspareunia, dysuria, enuresis, flank pain, frequency, genital sores, hematuria, menstrual problem, pelvic pain, urgency, vaginal bleeding, vaginal discharge and vaginal pain.   Musculoskeletal: Negative for arthralgias, back pain, gait problem, joint swelling, myalgias, neck pain and neck stiffness.   Skin: Negative for color change, pallor, rash and wound.   Allergic/Immunologic: Negative for environmental allergies, food allergies and immunocompromised state.    Neurological: Negative for dizziness, tremors, seizures, syncope, facial asymmetry, speech difficulty, weakness, light-headedness, numbness and headaches.   Hematological: Negative for adenopathy. Does not bruise/bleed easily.   Psychiatric/Behavioral: Negative for agitation, behavioral problems, confusion, decreased concentration, dysphoric mood, hallucinations, self-injury, sleep disturbance and suicidal ideas. The patient is not nervous/anxious and is not hyperactive.        Objective   Physical Exam   Constitutional: She is oriented to person, place, and time. She appears well-developed and well-nourished.   HENT:   Head: Normocephalic and atraumatic.   Right Ear: External ear normal.   Left Ear: External ear normal.   Eyes: Conjunctivae and EOM are normal. Right eye exhibits no discharge. Left eye exhibits no discharge. No scleral icterus.   Neck: Normal range of motion. Neck supple. Carotid bruit is not present. No thyromegaly present.   Cardiovascular: Regular rhythm and normal heart sounds.    No murmur heard.  Pulmonary/Chest: Effort normal and breath sounds normal. No respiratory distress. Right breast exhibits no inverted nipple, no mass, no nipple discharge, no skin change and no tenderness. Left breast exhibits no inverted nipple, no mass, no nipple discharge, no skin change and no tenderness. Breasts are symmetrical. There is no breast swelling.   Abdominal: Soft. Bowel sounds are normal. She exhibits no distension and no mass. There is no tenderness. There is no guarding. No hernia. Hernia confirmed negative in the right inguinal area and confirmed negative in the left inguinal area.   Genitourinary: Vagina normal. Rectal exam shows no mass. No breast tenderness, discharge or bleeding. There is no rash, tenderness, lesion or injury on the right labia. There is no rash, tenderness, lesion or injury on the left labia. No erythema or bleeding in the vagina. No signs of injury around the vagina. No  "vaginal discharge found.   Genitourinary Comments: Cervix, Uterus and Adnexa are surgically absent.  Urethra and urethral meatus normal  Bladder, normal no prolapse  Perineum and anus examined and without lesions   Musculoskeletal: Normal range of motion. She exhibits no edema or tenderness.   Lymphadenopathy:        Head (right side): No submental, no submandibular, no tonsillar, no preauricular, no posterior auricular and no occipital adenopathy present.        Head (left side): No submental, no submandibular, no tonsillar, no preauricular, no posterior auricular and no occipital adenopathy present.     She has no cervical adenopathy.        Right cervical: No superficial cervical, no deep cervical and no posterior cervical adenopathy present.       Left cervical: No superficial cervical, no deep cervical and no posterior cervical adenopathy present.     She has no axillary adenopathy.        Right: No inguinal adenopathy present.        Left: No inguinal adenopathy present.   Neurological: She is alert and oriented to person, place, and time. She exhibits normal muscle tone. Coordination normal.   Skin: Skin is warm and dry. No bruising and no rash noted. No erythema.   Psychiatric: She has a normal mood and affect. Her behavior is normal. Judgment and thought content normal.   Nursing note and vitals reviewed.        Vitals:    02/28/18 0857   BP: 108/68   BP Location: Left arm   Patient Position: Sitting   Cuff Size: Adult   Weight: 68.9 kg (152 lb)   Height: 167.6 cm (66\")       Maria Victoria was seen today for gynecologic exam.    Diagnoses and all orders for this visit:    Well woman exam with routine gynecological exam  Comments:  Normal well woman exam.  Mammogram ordered.      Abnormal urine odor  Comments:  UA done in office + for UTI.  RX for Macrobid given.  Urine sent for culture.   Orders:  -     POC Urinalysis Dipstick  -     Urine Culture - Urine, Urine, Clean Catch  -     nitrofurantoin, " macrocrystal-monohydrate, (MACROBID) 100 MG capsule; Take 1 capsule by mouth 2 (Two) Times a Day.    Screening for malignant neoplasm of breast  Comments:  Mammogram ordered.   Orders:  -     Mammo Screening Digital Tomosynthesis Bilateral With CAD; Future    Acute cystitis without hematuria  Comments:  UA + for UTI.  RX for Macrobid given.  Urine sent for culture.   Orders:  -     nitrofurantoin, macrocrystal-monohydrate, (MACROBID) 100 MG capsule; Take 1 capsule by mouth 2 (Two) Times a Day.        Body mass index is 24.53 kg/(m^2).     Non-Smoker    MyChart Instructions Given

## 2018-03-03 LAB
BACTERIA UR CULT: ABNORMAL
BACTERIA UR CULT: ABNORMAL
OTHER ANTIBIOTIC SUSC ISLT: ABNORMAL

## 2018-03-07 ENCOUNTER — HOSPITAL ENCOUNTER (OUTPATIENT)
Dept: MAMMOGRAPHY | Facility: HOSPITAL | Age: 53
Discharge: HOME | End: 2018-03-07
Admitting: NURSE PRACTITIONER

## 2018-03-07 DIAGNOSIS — Z12.39 SCREENING FOR MALIGNANT NEOPLASM OF BREAST: ICD-10-CM

## 2018-03-07 PROCEDURE — 77067 SCR MAMMO BI INCL CAD: CPT

## 2018-03-07 PROCEDURE — 77063 BREAST TOMOSYNTHESIS BI: CPT

## 2018-05-24 ENCOUNTER — HOSPITAL ENCOUNTER (OUTPATIENT)
Dept: PREADMISSION TESTING | Age: 53
Setting detail: OUTPATIENT SURGERY
Discharge: HOME | End: 2018-05-28

## 2018-05-24 ENCOUNTER — HOSPITAL ENCOUNTER (OUTPATIENT)
Dept: LAB | Age: 53
Discharge: HOME | End: 2018-05-24
Payer: COMMERCIAL

## 2018-05-24 ENCOUNTER — HOSPITAL ENCOUNTER (OUTPATIENT)
Dept: GENERAL RADIOLOGY | Age: 53
Discharge: HOME | End: 2018-05-24
Payer: COMMERCIAL

## 2018-05-24 ENCOUNTER — HOSPITAL ENCOUNTER (OUTPATIENT)
Dept: NON INVASIVE DIAGNOSTICS | Age: 53
Discharge: HOME | End: 2018-05-24
Payer: COMMERCIAL

## 2018-05-24 VITALS — HEIGHT: 66 IN | BODY MASS INDEX: 23.78 KG/M2 | WEIGHT: 148 LBS

## 2018-05-24 LAB
ALBUMIN SERPL-MCNC: 4.2 G/DL (ref 3.5–5.2)
ALP BLD-CCNC: 115 U/L (ref 35–104)
ALT SERPL-CCNC: 13 U/L (ref 5–33)
AMYLASE: 30 U/L (ref 28–100)
ANION GAP SERPL CALCULATED.3IONS-SCNC: 16 MMOL/L (ref 7–19)
AST SERPL-CCNC: 10 U/L (ref 5–32)
BASOPHILS ABSOLUTE: 0 K/UL (ref 0–0.2)
BASOPHILS RELATIVE PERCENT: 0.4 % (ref 0–1)
BILIRUB SERPL-MCNC: <0.2 MG/DL (ref 0.2–1.2)
BUN BLDV-MCNC: 14 MG/DL (ref 6–20)
CALCIUM SERPL-MCNC: 9.2 MG/DL (ref 8.6–10)
CHLORIDE BLD-SCNC: 103 MMOL/L (ref 98–111)
CO2: 24 MMOL/L (ref 22–29)
CREAT SERPL-MCNC: 0.7 MG/DL (ref 0.5–0.9)
EOSINOPHILS ABSOLUTE: 0.2 K/UL (ref 0–0.6)
EOSINOPHILS RELATIVE PERCENT: 1.8 % (ref 0–5)
GFR NON-AFRICAN AMERICAN: >60
GLUCOSE BLD-MCNC: 81 MG/DL (ref 74–109)
HCT VFR BLD CALC: 44.3 % (ref 37–47)
HEMOGLOBIN: 14.1 G/DL (ref 12–16)
LIPASE: 20 U/L (ref 13–60)
LYMPHOCYTES ABSOLUTE: 2.9 K/UL (ref 1.1–4.5)
LYMPHOCYTES RELATIVE PERCENT: 29.3 % (ref 20–40)
MCH RBC QN AUTO: 28.8 PG (ref 27–31)
MCHC RBC AUTO-ENTMCNC: 31.8 G/DL (ref 33–37)
MCV RBC AUTO: 90.4 FL (ref 81–99)
MONOCYTES ABSOLUTE: 0.4 K/UL (ref 0–0.9)
MONOCYTES RELATIVE PERCENT: 4.2 % (ref 0–10)
NEUTROPHILS ABSOLUTE: 6.3 K/UL (ref 1.5–7.5)
NEUTROPHILS RELATIVE PERCENT: 63.9 % (ref 50–65)
PDW BLD-RTO: 13.9 % (ref 11.5–14.5)
PLATELET # BLD: 229 K/UL (ref 130–400)
PMV BLD AUTO: 12.2 FL (ref 9.4–12.3)
POTASSIUM SERPL-SCNC: 4 MMOL/L (ref 3.5–5)
RBC # BLD: 4.9 M/UL (ref 4.2–5.4)
SODIUM BLD-SCNC: 143 MMOL/L (ref 136–145)
TOTAL PROTEIN: 7.1 G/DL (ref 6.6–8.7)
WBC # BLD: 9.8 K/UL (ref 4.8–10.8)

## 2018-05-24 PROCEDURE — 71046 X-RAY EXAM CHEST 2 VIEWS: CPT

## 2018-05-24 PROCEDURE — 93005 ELECTROCARDIOGRAM TRACING: CPT

## 2018-05-24 RX ORDER — DEXLANSOPRAZOLE 60 MG/1
60 CAPSULE, DELAYED RELEASE ORAL DAILY
COMMUNITY
End: 2019-07-18

## 2018-05-24 RX ORDER — SUCRALFATE 1 G/1
1 TABLET ORAL NIGHTLY
COMMUNITY
End: 2019-07-18

## 2018-05-24 RX ORDER — AMITRIPTYLINE HYDROCHLORIDE 25 MG/1
25 TABLET, FILM COATED ORAL NIGHTLY
COMMUNITY
End: 2019-07-18

## 2018-05-24 RX ORDER — MESALAMINE 500 MG/1
500 CAPSULE, EXTENDED RELEASE ORAL 2 TIMES DAILY
COMMUNITY

## 2018-05-29 ENCOUNTER — ANESTHESIA EVENT (OUTPATIENT)
Dept: OPERATING ROOM | Age: 53
End: 2018-05-29

## 2018-05-31 ENCOUNTER — HOSPITAL ENCOUNTER (OUTPATIENT)
Age: 53
Setting detail: OUTPATIENT SURGERY
Discharge: HOME | End: 2018-05-31
Attending: SPECIALIST | Admitting: SPECIALIST

## 2018-05-31 ENCOUNTER — ANESTHESIA (OUTPATIENT)
Dept: OPERATING ROOM | Age: 53
End: 2018-05-31

## 2018-05-31 ENCOUNTER — HOSPITAL ENCOUNTER (OUTPATIENT)
Age: 53
Setting detail: SPECIMEN
Discharge: HOME | End: 2018-05-31
Payer: COMMERCIAL

## 2018-05-31 VITALS
DIASTOLIC BLOOD PRESSURE: 76 MMHG | HEIGHT: 66 IN | TEMPERATURE: 97.2 F | SYSTOLIC BLOOD PRESSURE: 110 MMHG | OXYGEN SATURATION: 97 % | HEART RATE: 56 BPM | WEIGHT: 148 LBS | RESPIRATION RATE: 14 BRPM | BODY MASS INDEX: 23.78 KG/M2

## 2018-05-31 VITALS
RESPIRATION RATE: 5 BRPM | DIASTOLIC BLOOD PRESSURE: 123 MMHG | TEMPERATURE: 98.6 F | OXYGEN SATURATION: 97 % | SYSTOLIC BLOOD PRESSURE: 182 MMHG

## 2018-05-31 PROCEDURE — G8907 PT DOC NO EVENTS ON DISCHARG: HCPCS

## 2018-05-31 PROCEDURE — G8918 PT W/O PREOP ORDER IV AB PRO: HCPCS

## 2018-05-31 PROCEDURE — 88304 TISSUE EXAM BY PATHOLOGIST: CPT

## 2018-05-31 PROCEDURE — 47562 LAPAROSCOPIC CHOLECYSTECTOMY: CPT

## 2018-05-31 RX ORDER — METOCLOPRAMIDE HYDROCHLORIDE 5 MG/ML
10 INJECTION INTRAMUSCULAR; INTRAVENOUS
Status: DISCONTINUED | OUTPATIENT
Start: 2018-05-31 | End: 2018-05-31 | Stop reason: HOSPADM

## 2018-05-31 RX ORDER — METOCLOPRAMIDE 10 MG/1
10 TABLET ORAL ONCE
Status: COMPLETED | OUTPATIENT
Start: 2018-05-31 | End: 2018-05-31

## 2018-05-31 RX ORDER — ROCURONIUM BROMIDE 10 MG/ML
INJECTION, SOLUTION INTRAVENOUS PRN
Status: DISCONTINUED | OUTPATIENT
Start: 2018-05-31 | End: 2018-05-31 | Stop reason: SDUPTHER

## 2018-05-31 RX ORDER — DIPHENHYDRAMINE HYDROCHLORIDE 50 MG/ML
12.5 INJECTION INTRAMUSCULAR; INTRAVENOUS
Status: DISCONTINUED | OUTPATIENT
Start: 2018-05-31 | End: 2018-05-31 | Stop reason: HOSPADM

## 2018-05-31 RX ORDER — SCOLOPAMINE TRANSDERMAL SYSTEM 1 MG/1
1 PATCH, EXTENDED RELEASE TRANSDERMAL
Status: DISCONTINUED | OUTPATIENT
Start: 2018-05-31 | End: 2018-05-31 | Stop reason: HOSPADM

## 2018-05-31 RX ORDER — NEOSTIGMINE METHYLSULFATE 5 MG/5 ML
SYRINGE (ML) INTRAVENOUS PRN
Status: DISCONTINUED | OUTPATIENT
Start: 2018-05-31 | End: 2018-05-31 | Stop reason: SDUPTHER

## 2018-05-31 RX ORDER — SUCCINYLCHOLINE CHLORIDE 20 MG/ML
INJECTION INTRAMUSCULAR; INTRAVENOUS PRN
Status: DISCONTINUED | OUTPATIENT
Start: 2018-05-31 | End: 2018-05-31 | Stop reason: SDUPTHER

## 2018-05-31 RX ORDER — PROMETHAZINE HYDROCHLORIDE 25 MG/ML
6.25 INJECTION, SOLUTION INTRAMUSCULAR; INTRAVENOUS
Status: COMPLETED | OUTPATIENT
Start: 2018-05-31 | End: 2018-05-31

## 2018-05-31 RX ORDER — LIDOCAINE HYDROCHLORIDE 10 MG/ML
1 INJECTION, SOLUTION EPIDURAL; INFILTRATION; INTRACAUDAL; PERINEURAL
Status: COMPLETED | OUTPATIENT
Start: 2018-05-31 | End: 2018-05-31

## 2018-05-31 RX ORDER — BUPIVACAINE HYDROCHLORIDE AND EPINEPHRINE 5; 5 MG/ML; UG/ML
INJECTION, SOLUTION EPIDURAL; INTRACAUDAL; PERINEURAL PRN
Status: DISCONTINUED | OUTPATIENT
Start: 2018-05-31 | End: 2018-05-31 | Stop reason: HOSPADM

## 2018-05-31 RX ORDER — MORPHINE SULFATE 10 MG/ML
2 INJECTION, SOLUTION INTRAMUSCULAR; INTRAVENOUS EVERY 5 MIN PRN
Status: DISCONTINUED | OUTPATIENT
Start: 2018-05-31 | End: 2018-05-31 | Stop reason: HOSPADM

## 2018-05-31 RX ORDER — FAMOTIDINE 20 MG/1
20 TABLET, FILM COATED ORAL ONCE
Status: COMPLETED | OUTPATIENT
Start: 2018-05-31 | End: 2018-05-31

## 2018-05-31 RX ORDER — LABETALOL HYDROCHLORIDE 5 MG/ML
5 INJECTION, SOLUTION INTRAVENOUS EVERY 10 MIN PRN
Status: DISCONTINUED | OUTPATIENT
Start: 2018-05-31 | End: 2018-05-31 | Stop reason: HOSPADM

## 2018-05-31 RX ORDER — ONDANSETRON 2 MG/ML
INJECTION INTRAMUSCULAR; INTRAVENOUS PRN
Status: DISCONTINUED | OUTPATIENT
Start: 2018-05-31 | End: 2018-05-31 | Stop reason: SDUPTHER

## 2018-05-31 RX ORDER — MORPHINE SULFATE 10 MG/ML
4 INJECTION, SOLUTION INTRAMUSCULAR; INTRAVENOUS EVERY 5 MIN PRN
Status: DISCONTINUED | OUTPATIENT
Start: 2018-05-31 | End: 2018-05-31 | Stop reason: HOSPADM

## 2018-05-31 RX ORDER — HYDROMORPHONE HCL 110MG/55ML
PATIENT CONTROLLED ANALGESIA SYRINGE INTRAVENOUS PRN
Status: DISCONTINUED | OUTPATIENT
Start: 2018-05-31 | End: 2018-05-31 | Stop reason: SDUPTHER

## 2018-05-31 RX ORDER — ENALAPRILAT 2.5 MG/2ML
1.25 INJECTION INTRAVENOUS
Status: DISCONTINUED | OUTPATIENT
Start: 2018-05-31 | End: 2018-05-31 | Stop reason: HOSPADM

## 2018-05-31 RX ORDER — SODIUM CHLORIDE, SODIUM LACTATE, POTASSIUM CHLORIDE, CALCIUM CHLORIDE 600; 310; 30; 20 MG/100ML; MG/100ML; MG/100ML; MG/100ML
INJECTION, SOLUTION INTRAVENOUS CONTINUOUS
Status: DISCONTINUED | OUTPATIENT
Start: 2018-05-31 | End: 2018-05-31 | Stop reason: HOSPADM

## 2018-05-31 RX ORDER — PROPOFOL 10 MG/ML
INJECTION, EMULSION INTRAVENOUS PRN
Status: DISCONTINUED | OUTPATIENT
Start: 2018-05-31 | End: 2018-05-31 | Stop reason: SDUPTHER

## 2018-05-31 RX ORDER — DEXAMETHASONE SODIUM PHOSPHATE 4 MG/ML
INJECTION, SOLUTION INTRA-ARTICULAR; INTRALESIONAL; INTRAMUSCULAR; INTRAVENOUS; SOFT TISSUE PRN
Status: DISCONTINUED | OUTPATIENT
Start: 2018-05-31 | End: 2018-05-31 | Stop reason: SDUPTHER

## 2018-05-31 RX ORDER — HYDROMORPHONE HCL 110MG/55ML
0.25 PATIENT CONTROLLED ANALGESIA SYRINGE INTRAVENOUS EVERY 5 MIN PRN
Status: DISCONTINUED | OUTPATIENT
Start: 2018-05-31 | End: 2018-05-31 | Stop reason: HOSPADM

## 2018-05-31 RX ORDER — MIDAZOLAM HYDROCHLORIDE 5 MG/ML
INJECTION INTRAMUSCULAR; INTRAVENOUS PRN
Status: DISCONTINUED | OUTPATIENT
Start: 2018-05-31 | End: 2018-05-31 | Stop reason: SDUPTHER

## 2018-05-31 RX ORDER — CEFAZOLIN SODIUM 1 G/3ML
INJECTION, POWDER, FOR SOLUTION INTRAMUSCULAR; INTRAVENOUS PRN
Status: DISCONTINUED | OUTPATIENT
Start: 2018-05-31 | End: 2018-05-31 | Stop reason: SDUPTHER

## 2018-05-31 RX ORDER — GLYCOPYRROLATE 0.6MG/3ML
SYRINGE (ML) INTRAVENOUS PRN
Status: DISCONTINUED | OUTPATIENT
Start: 2018-05-31 | End: 2018-05-31 | Stop reason: SDUPTHER

## 2018-05-31 RX ORDER — HYDRALAZINE HYDROCHLORIDE 20 MG/ML
5 INJECTION INTRAMUSCULAR; INTRAVENOUS EVERY 10 MIN PRN
Status: DISCONTINUED | OUTPATIENT
Start: 2018-05-31 | End: 2018-05-31 | Stop reason: HOSPADM

## 2018-05-31 RX ORDER — MEPERIDINE HYDROCHLORIDE 25 MG/ML
12.5 INJECTION INTRAMUSCULAR; INTRAVENOUS; SUBCUTANEOUS EVERY 5 MIN PRN
Status: DISCONTINUED | OUTPATIENT
Start: 2018-05-31 | End: 2018-05-31 | Stop reason: HOSPADM

## 2018-05-31 RX ORDER — HYDROMORPHONE HCL 110MG/55ML
0.5 PATIENT CONTROLLED ANALGESIA SYRINGE INTRAVENOUS EVERY 5 MIN PRN
Status: DISCONTINUED | OUTPATIENT
Start: 2018-05-31 | End: 2018-05-31 | Stop reason: HOSPADM

## 2018-05-31 RX ADMIN — PROMETHAZINE HYDROCHLORIDE 6.25 MG: 25 INJECTION, SOLUTION INTRAMUSCULAR; INTRAVENOUS at 16:07

## 2018-05-31 RX ADMIN — MIDAZOLAM HYDROCHLORIDE 2 MG: 5 INJECTION INTRAMUSCULAR; INTRAVENOUS at 14:05

## 2018-05-31 RX ADMIN — Medication 3 MG: at 14:56

## 2018-05-31 RX ADMIN — Medication 0.4 MG: at 14:56

## 2018-05-31 RX ADMIN — ROCURONIUM BROMIDE 5 MG: 10 INJECTION, SOLUTION INTRAVENOUS at 14:12

## 2018-05-31 RX ADMIN — DEXAMETHASONE SODIUM PHOSPHATE 4 MG: 4 INJECTION, SOLUTION INTRA-ARTICULAR; INTRALESIONAL; INTRAMUSCULAR; INTRAVENOUS; SOFT TISSUE at 14:17

## 2018-05-31 RX ADMIN — Medication 0.5 MG: at 14:25

## 2018-05-31 RX ADMIN — CEFAZOLIN SODIUM 1000 MG: 1 INJECTION, POWDER, FOR SOLUTION INTRAMUSCULAR; INTRAVENOUS at 14:14

## 2018-05-31 RX ADMIN — PROPOFOL 170 MG: 10 INJECTION, EMULSION INTRAVENOUS at 14:10

## 2018-05-31 RX ADMIN — SODIUM CHLORIDE, SODIUM LACTATE, POTASSIUM CHLORIDE, CALCIUM CHLORIDE: 600; 310; 30; 20 INJECTION, SOLUTION INTRAVENOUS at 13:16

## 2018-05-31 RX ADMIN — ROCURONIUM BROMIDE 20 MG: 10 INJECTION, SOLUTION INTRAVENOUS at 14:19

## 2018-05-31 RX ADMIN — LIDOCAINE HYDROCHLORIDE 50 MG: 10 INJECTION, SOLUTION EPIDURAL; INFILTRATION; INTRACAUDAL; PERINEURAL at 14:10

## 2018-05-31 RX ADMIN — SUCCINYLCHOLINE CHLORIDE 120 MG: 20 INJECTION INTRAMUSCULAR; INTRAVENOUS at 14:12

## 2018-05-31 RX ADMIN — METOCLOPRAMIDE 10 MG: 10 TABLET ORAL at 13:11

## 2018-05-31 RX ADMIN — FAMOTIDINE 20 MG: 20 TABLET, FILM COATED ORAL at 13:11

## 2018-05-31 RX ADMIN — ONDANSETRON 4 MG: 2 INJECTION INTRAMUSCULAR; INTRAVENOUS at 14:25

## 2018-05-31 RX ADMIN — Medication 0.5 MG: at 14:58

## 2018-05-31 RX ADMIN — Medication 0.5 MG: at 15:14

## 2018-05-31 ASSESSMENT — PAIN DESCRIPTION - PAIN TYPE
TYPE: SURGICAL PAIN

## 2018-05-31 ASSESSMENT — PAIN DESCRIPTION - ORIENTATION
ORIENTATION: RIGHT
ORIENTATION: RIGHT

## 2018-05-31 ASSESSMENT — PAIN DESCRIPTION - DESCRIPTORS: DESCRIPTORS: SORE;DISCOMFORT

## 2018-05-31 ASSESSMENT — PAIN SCALES - GENERAL
PAINLEVEL_OUTOF10: 7
PAINLEVEL_OUTOF10: 10

## 2018-05-31 ASSESSMENT — PAIN DESCRIPTION - LOCATION
LOCATION: ABDOMEN
LOCATION: ABDOMEN

## 2018-05-31 ASSESSMENT — PAIN DESCRIPTION - PROGRESSION
CLINICAL_PROGRESSION: OTHER (COMMENT)
CLINICAL_PROGRESSION: NOT CHANGED

## 2019-01-03 ENCOUNTER — OFFICE VISIT (OUTPATIENT)
Dept: CARDIOLOGY | Facility: CLINIC | Age: 54
End: 2019-01-03

## 2019-01-03 VITALS
WEIGHT: 146 LBS | DIASTOLIC BLOOD PRESSURE: 60 MMHG | SYSTOLIC BLOOD PRESSURE: 110 MMHG | HEART RATE: 92 BPM | OXYGEN SATURATION: 99 % | BODY MASS INDEX: 23.46 KG/M2 | HEIGHT: 66 IN

## 2019-01-03 DIAGNOSIS — Z72.0 TOBACCO ABUSE: ICD-10-CM

## 2019-01-03 DIAGNOSIS — R07.89 ATYPICAL CHEST PAIN: Primary | ICD-10-CM

## 2019-01-03 PROCEDURE — 99406 BEHAV CHNG SMOKING 3-10 MIN: CPT | Performed by: INTERNAL MEDICINE

## 2019-01-03 PROCEDURE — 93000 ELECTROCARDIOGRAM COMPLETE: CPT | Performed by: INTERNAL MEDICINE

## 2019-01-03 PROCEDURE — 99204 OFFICE O/P NEW MOD 45 MIN: CPT | Performed by: INTERNAL MEDICINE

## 2019-01-03 RX ORDER — DEXLANSOPRAZOLE 60 MG/1
60 CAPSULE, DELAYED RELEASE ORAL DAILY
COMMUNITY
End: 2019-03-01 | Stop reason: SDUPTHER

## 2019-01-03 RX ORDER — ALPRAZOLAM 0.5 MG/1
0.25 TABLET ORAL NIGHTLY PRN
COMMUNITY
End: 2019-09-10

## 2019-01-03 NOTE — PROGRESS NOTES
Subjective:     Encounter Date: 01/03/2019      Patient ID: Maria Victoria Aranda is a 53 y.o. female with no prior cardiac history does have a history of tobacco abuse, presenting today for further evaluation of intermittent chest discomfort.    Referring Provider: JOHN Childress  Reason for Referral: Chest discomfort    Chief Complaint: Chest discomfort    Chest Pain    This is a recurrent problem. The onset quality is sudden. The problem occurs intermittently. The problem has been waxing and waning. The pain is present in the lateral region. The pain is moderate. The quality of the pain is described as sharp. The pain does not radiate. Associated symptoms include headaches. Pertinent negatives include no abdominal pain, back pain, claudication, cough, dizziness, exertional chest pressure, fever, hemoptysis, irregular heartbeat, nausea, numbness, orthopnea, palpitations, PND, shortness of breath, syncope or vomiting. The pain is aggravated by nothing. She has tried nothing for the symptoms.   Pertinent negatives for past medical history include no seizures.     This is a 53-year-old female with no personal history of cardiovascular disease but with a history of tobacco abuse who presents for further evaluation of chest discomfort.  Patient says that her discomfort has been intermittent over the past year or so.  She did have a treadmill stress test for similar symptoms last year that was thought to be low risk.  She says this occurred about last November.  Her symptoms started to become more noticeable in about November 2018.  At that time, she noticed sudden onset of discomfort that was in the left side of her chest, underneath her left breast.  She describes the pain as sharp, moderate in severity, nonradiating, no associated signs or symptoms.  She states that the discomfort can occur at random times and is not necessarily related to activities.  She has recently had a headache but does not associate this with her  episodes of chest discomfort.  She thinks that some of her symptoms may be related to stress.  She recently had a friend and his wife  suddenly and she thinks this was a contributor to some of her stress levels.  She denies any recent decline in exercise tolerance, significant shortness of breath or dyspnea on exertion.  She has not had any palpitations, lightheadedness, dizziness, syncope.  He does note that she has some issues with anxiety and takes Xanax at this time.  She also thinks that some anxiety may be related to her chest discomfort episodes.    The following portions of the patient's history were reviewed and updated as appropriate: allergies, current medications, past family history, past medical history, past social history, past surgical history and problem list.     Past Medical History:   Diagnosis Date   • Esophagitis determined by endoscopy      Past Surgical History:   Procedure Laterality Date   • APPENDECTOMY     • HAND TENDON SURGERY     • HYSTERECTOMY     • OOPHORECTOMY     • TONSILLECTOMY         Current Outpatient Medications:   •  ALPRAZolam (XANAX) 0.5 MG tablet, Take 0.25 mg by mouth At Night As Needed for Anxiety., Disp: , Rfl:   •  dexlansoprazole (DEXILANT) 60 MG capsule, Take 60 mg by mouth Daily., Disp: , Rfl:   •  PENTASA 500 MG CR capsule, 2,000 mg 2 (Two) Times a Day., Disp: , Rfl:   •  sucralfate (CARAFATE) 1 g tablet, , Disp: , Rfl:     No Known Allergies    Social History     Tobacco Use   • Smoking status: Current Every Day Smoker     Packs/day: 1.00   • Smokeless tobacco: Never Used   Substance Use Topics   • Alcohol use: No     Family History   Problem Relation Age of Onset   • Hypertension Father    • Heart disease Paternal Grandmother    • Colon cancer Mother    • No Known Problems Sister    • Breast cancer Neg Hx    • Ovarian cancer Neg Hx      Review of Systems   Constitution: Negative for chills, fever, night sweats and weight loss.   HENT: Negative for congestion  and hearing loss.    Eyes: Negative for blurred vision and pain.   Cardiovascular: Positive for chest pain. Negative for claudication, dyspnea on exertion, irregular heartbeat, leg swelling, orthopnea, palpitations, paroxysmal nocturnal dyspnea and syncope.   Respiratory: Negative for cough, hemoptysis, shortness of breath and wheezing.    Endocrine: Negative for cold intolerance, heat intolerance, polydipsia and polyuria.   Hematologic/Lymphatic: Negative for adenopathy and bleeding problem. Does not bruise/bleed easily.   Skin: Negative for color change, poor wound healing and rash.   Musculoskeletal: Negative for arthritis, back pain, joint pain, joint swelling, myalgias and neck pain.   Gastrointestinal: Negative for abdominal pain, change in bowel habit, constipation, diarrhea, heartburn, hematochezia, melena, nausea and vomiting.   Genitourinary: Negative for bladder incontinence, dysuria, frequency, hematuria and nocturia.   Neurological: Positive for headaches. Negative for dizziness, focal weakness, light-headedness, loss of balance, numbness and seizures.   Psychiatric/Behavioral: Negative for altered mental status, memory loss and substance abuse. The patient is nervous/anxious.    Allergic/Immunologic: Negative for hives and persistent infections.         ECG 12 Lead  Date/Time: 1/3/2019 8:28 AM  Performed by: Martin Llanos MD  Authorized by: Martin Llanos MD   Comparison: compared with previous ECG from 11/11/2017  Similar to previous ECG  Rhythm: sinus rhythm  Rate: normal  BPM: 83  Conduction: conduction normal  QRS axis: normal  Clinical impression: non-specific ECG  Comments: NSTT               Objective:     Physical Exam   Constitutional: She is oriented to person, place, and time. Vital signs are normal. She appears well-developed and well-nourished. She is cooperative.  Non-toxic appearance. No distress.   HENT:   Head: Normocephalic and atraumatic.   Right Ear: External ear  normal.   Left Ear: External ear normal.   Nose: Nose normal.   Mouth/Throat: Uvula is midline, oropharynx is clear and moist and mucous membranes are normal. Mucous membranes are not pale, not dry and not cyanotic. No oropharyngeal exudate.   Eyes: EOM and lids are normal. Pupils are equal, round, and reactive to light.   Neck: Normal range of motion. Neck supple. No hepatojugular reflux and no JVD present. Carotid bruit is not present. No tracheal deviation and no edema present. No thyroid mass and no thyromegaly present.   Cardiovascular: Normal rate, regular rhythm, S1 normal, S2 normal, normal heart sounds, intact distal pulses and normal pulses.  No extrasystoles are present. PMI is not displaced. Exam reveals no gallop and no friction rub.   No murmur heard.  Pulses:       Radial pulses are 2+ on the right side, and 2+ on the left side.        Femoral pulses are 2+ on the right side, and 2+ on the left side.       Dorsalis pedis pulses are 2+ on the right side, and 2+ on the left side.        Posterior tibial pulses are 2+ on the right side, and 2+ on the left side.   Pulmonary/Chest: Effort normal and breath sounds normal. No accessory muscle usage. No respiratory distress. She has no wheezes. She has no rales. She exhibits no tenderness.   Abdominal: Soft. Normal appearance and bowel sounds are normal. She exhibits no distension, no abdominal bruit and no pulsatile midline mass. There is no hepatosplenomegaly. There is no tenderness.   Musculoskeletal: Normal range of motion. She exhibits no edema, tenderness or deformity.   Lymphadenopathy:     She has no cervical adenopathy.   Neurological: She is oriented to person, place, and time. She has normal strength. No cranial nerve deficit.   Skin: Skin is warm, dry and intact. No rash noted. She is not diaphoretic. No cyanosis or erythema. Nails show no clubbing.   Psychiatric: She has a normal mood and affect. Her speech is normal and behavior is normal.  "Thought content normal.   Vitals reviewed.    /60 (BP Location: Left arm, Patient Position: Sitting)   Pulse 92   Ht 167.6 cm (66\")   Wt 66.2 kg (146 lb)   SpO2 99%   BMI 23.57 kg/m²   Lab Review:     Treadmill stress test 11/11/17: Low risk for ischemia      Assessment:          Diagnosis Plan   1. Atypical chest pain  ECG 12 Lead    Adult Stress Echo W/ Cont or Stress Agent if Necessary Per Protocol   2. Tobacco abuse          Plan:       1.  Atypical chest pain: The patient has chest discomfort that has been present intermittently for over a year.  She did have a treadmill stress test last year that was low risk.  I think that given persistent symptoms, a stress echocardiogram is reasonable at this time.  I do not necessarily think that her symptoms are cardiac in origin, however I do feel that further workup is at least reasonable.  If this stress test is abnormal, we will investigate further.  If the stress test is low risk, no further workup would be indicated.    2.  Tobacco abuse: I advised Maria Victoria of the risks of continuing to use tobacco. During this visit, I spent 3 minutes counseling the patient regarding tobacco cessation.    Patient's Body mass index is 23.57 kg/m². BMI is within normal parameters. No follow-up required.    Follow-up: pending stress test results.         "

## 2019-01-05 ENCOUNTER — OFFICE VISIT (OUTPATIENT)
Dept: URGENT CARE | Facility: CLINIC | Age: 54
End: 2019-01-05
Payer: COMMERCIAL

## 2019-01-05 VITALS
HEIGHT: 66 IN | BODY MASS INDEX: 23.46 KG/M2 | WEIGHT: 146 LBS | DIASTOLIC BLOOD PRESSURE: 77 MMHG | SYSTOLIC BLOOD PRESSURE: 113 MMHG | OXYGEN SATURATION: 98 % | TEMPERATURE: 98 F | RESPIRATION RATE: 16 BRPM | HEART RATE: 73 BPM

## 2019-01-05 DIAGNOSIS — Z72.0 TOBACCO CONSUMPTION: ICD-10-CM

## 2019-01-05 DIAGNOSIS — R51.9 ACUTE NONINTRACTABLE HEADACHE, UNSPECIFIED HEADACHE TYPE: ICD-10-CM

## 2019-01-05 DIAGNOSIS — Z71.6 TOBACCO ABUSE COUNSELING: ICD-10-CM

## 2019-01-05 DIAGNOSIS — G44.209 ACUTE NON INTRACTABLE TENSION-TYPE HEADACHE: Primary | ICD-10-CM

## 2019-01-05 PROCEDURE — 3008F PR BODY MASS INDEX (BMI) DOCUMENTED: ICD-10-PCS | Mod: CPTII,S$GLB,, | Performed by: NURSE PRACTITIONER

## 2019-01-05 PROCEDURE — 99204 OFFICE O/P NEW MOD 45 MIN: CPT | Mod: 25,S$GLB,, | Performed by: NURSE PRACTITIONER

## 2019-01-05 PROCEDURE — 99204 PR OFFICE/OUTPT VISIT, NEW, LEVL IV, 45-59 MIN: ICD-10-PCS | Mod: 25,S$GLB,, | Performed by: NURSE PRACTITIONER

## 2019-01-05 PROCEDURE — 96372 PR INJECTION,THERAP/PROPH/DIAG2ST, IM OR SUBCUT: ICD-10-PCS | Mod: S$GLB,,, | Performed by: NURSE PRACTITIONER

## 2019-01-05 PROCEDURE — 3008F BODY MASS INDEX DOCD: CPT | Mod: CPTII,S$GLB,, | Performed by: NURSE PRACTITIONER

## 2019-01-05 PROCEDURE — 96372 THER/PROPH/DIAG INJ SC/IM: CPT | Mod: S$GLB,,, | Performed by: NURSE PRACTITIONER

## 2019-01-05 RX ORDER — DEXLANSOPRAZOLE 60 MG/1
60 CAPSULE, DELAYED RELEASE ORAL
COMMUNITY

## 2019-01-05 RX ORDER — MESALAMINE 500 MG/1
2000 CAPSULE, EXTENDED RELEASE ORAL
COMMUNITY
Start: 2018-02-01

## 2019-01-05 RX ORDER — KETOROLAC TROMETHAMINE 30 MG/ML
30 INJECTION, SOLUTION INTRAMUSCULAR; INTRAVENOUS ONCE
Status: COMPLETED | OUTPATIENT
Start: 2019-01-05 | End: 2019-01-05

## 2019-01-05 RX ORDER — BUTALBITAL, ASPIRIN, CAFFEINE AND CODEINE PHOSPHATE 50; 325; 40; 30 MG/1; MG/1; MG/1; MG/1
1 CAPSULE ORAL EVERY 4 HOURS PRN
Qty: 15 CAPSULE | Refills: 0 | Status: SHIPPED | OUTPATIENT
Start: 2019-01-05 | End: 2019-01-15

## 2019-01-05 RX ORDER — ALPRAZOLAM 0.5 MG/1
0.25 TABLET ORAL
COMMUNITY

## 2019-01-05 RX ADMIN — KETOROLAC TROMETHAMINE 30 MG: 30 INJECTION, SOLUTION INTRAMUSCULAR; INTRAVENOUS at 12:01

## 2019-01-05 NOTE — PROGRESS NOTES
"Subjective:       Patient ID: Maddie Nolan is a 53 y.o. female.    Vitals:  height is 5' 6" (1.676 m) and weight is 66.2 kg (146 lb). Her temperature is 97.6 °F (36.4 °C). Her blood pressure is 113/77 and her pulse is 73. Her respiration is 16 and oxygen saturation is 98%.     Chief Complaint: Headache (8 days )    Pt is [place]. Reports constant headaches 8 days now. Denies hx of headaches or injury. Has tried ibuprofen, flexeril, tylenol but no change. Says its dull but constant and uncomfortable. Got over shingles dec 1st.      Headache    This is a new problem. The current episode started in the past 7 days. The problem occurs constantly. The problem has been unchanged. The pain is located in the frontal region. The pain radiates to the left neck and right neck. The pain quality is not similar to prior headaches. The quality of the pain is described as dull and aching. The pain is at a severity of 5/10. Pertinent negatives include no blurred vision, coughing, dizziness, eye pain, fever, loss of balance, nausea, neck pain, phonophobia, photophobia, tingling, tinnitus, vomiting or weakness. Nothing aggravates the symptoms. There is no history of migraine headaches or migraines in the family.       Constitution: Negative for chills, sweating and fever.   HENT: Negative for tinnitus, facial swelling, congestion and sinus pain.    Neck: Negative for neck pain and neck stiffness.   Eyes: Negative for eye pain, photophobia, vision loss, double vision and blurred vision.   Respiratory: Negative for cough.    Gastrointestinal: Negative for nausea and vomiting.   Genitourinary: Negative for missed menses.   Musculoskeletal: Negative for trauma and muscle ache.   Skin: Negative for rash, wound and lesion.   Neurological: Positive for headaches. Negative for dizziness, history of vertigo, light-headedness, facial drooping, speech difficulty, coordination disturbances, loss of balance, history of migraines, disorientation and " loss of consciousness.   Psychiatric/Behavioral: Negative for disorientation, confusion, nervous/anxious, sleep disturbance and depression. The patient is not nervous/anxious.        Objective:      Physical Exam   Constitutional: She is oriented to person, place, and time. She appears well-developed and well-nourished. She is cooperative.  Non-toxic appearance. She does not appear ill. No distress.   HENT:   Head: Normocephalic and atraumatic.   Right Ear: Hearing, tympanic membrane, external ear and ear canal normal.   Left Ear: Hearing, tympanic membrane, external ear and ear canal normal.   Nose: Nose normal. No mucosal edema, rhinorrhea or nasal deformity. No epistaxis. Right sinus exhibits no maxillary sinus tenderness and no frontal sinus tenderness. Left sinus exhibits no maxillary sinus tenderness and no frontal sinus tenderness.   Mouth/Throat: Uvula is midline, oropharynx is clear and moist and mucous membranes are normal. No trismus in the jaw. Normal dentition. No uvula swelling. No posterior oropharyngeal erythema.   No temporal TTP   Eyes: Conjunctivae, EOM and lids are normal. Pupils are equal, round, and reactive to light. No scleral icterus.   Sclera clear bilat   Neck: Trachea normal, normal range of motion, full passive range of motion without pain and phonation normal. Neck supple. No neck rigidity.   Cardiovascular: Normal rate, regular rhythm, normal heart sounds, intact distal pulses and normal pulses.   Pulmonary/Chest: Effort normal and breath sounds normal. No respiratory distress.   Abdominal: Soft. Normal appearance and bowel sounds are normal. She exhibits no distension. There is no tenderness.   Musculoskeletal: Normal range of motion. She exhibits no edema or deformity.   Neurological: She is alert and oriented to person, place, and time. No cranial nerve deficit. She exhibits normal muscle tone. Coordination normal.   Skin: Skin is warm, dry and intact. She is not diaphoretic. No  pallor.   Psychiatric: She has a normal mood and affect. Her speech is normal and behavior is normal. Judgment and thought content normal. Cognition and memory are normal.   Nursing note and vitals reviewed.      Assessment:       1. Acute non intractable tension-type headache    2. Acute nonintractable headache, unspecified headache type        Plan:         Acute non intractable tension-type headache  -     ketorolac injection 30 mg  -     codeine-butalbital-ASA-caffeine (BUTALBITAL COMPOUND-CODEINE) 05--40 mg Cap; Take 1 capsule by mouth every 4 (four) hours as needed.  Dispense: 15 capsule; Refill: 0    Acute nonintractable headache, unspecified headache type  -     ketorolac injection 30 mg  -     codeine-butalbital-ASA-caffeine (BUTALBITAL COMPOUND-CODEINE) 39--40 mg Cap; Take 1 capsule by mouth every 4 (four) hours as needed.  Dispense: 15 capsule; Refill: 0

## 2019-01-05 NOTE — PATIENT INSTRUCTIONS
Managing Tension-type Headache Symptoms  A tension-type headache can develop slowly. Being aware of the symptoms helps you recognize a headache early. Then you can act to reduce pain and relieve tension. Methods for relieving your symptoms include self-care and medicine.    Tension-type symptoms  The earlier you recognize the symptoms of a tension-type headache, the easier it is to treat. Tension-type headaches may:  · Begin with fatigue, tension, or pain in the neck and shoulders  · Feel like a band around the head  · Be concentrated in the temple, the back of the head, behind the eyes, or in the face  · Come and go, or last for days, weeks, or even longer  · Involve referred pain -- this means that the area that hurts may not be where the problem begins  Self-care during a tension-type headache  When you have a tension-type headache, there are things you can do to relax, loosen muscles, and reduce the pain:  · Brush your scalp lightly with a soft hairbrush.  · Give yourself a massage. Knead the muscles running from your shoulders up the back of your skull. Or ask a friend to gently massage your neck and shoulders.  · Use an ice pack. Wrap a thin cloth around a cold pack, a cold can of soda, or a bag of frozen vegetables. Apply this directly to the place where you feel pain (such as your neck or temples).  · Use moist heat to relax your muscles. Take a warm shower or bath. Or drape a warm, moist towel around your neck and shoulders.  Relieving pain and tension  Over-the-counter or prescription medicine can help relieve pain. Another way to reduce your pain is to use relaxation techniques to loosen tight muscles.  Medicine  Medicine used for tension-type headaches include the following:  · NSAIDs (nonsteroidal anti-inflammatories), such as aspirin and ibuprofen, relieve inflammation and help block pain signals.  · Acetaminophen treats pain, and some formulations contain caffeine.   · Muscle relaxants can reduce  painful muscle contractions.  Taking medicine safely  Be aware that:  · Taking analgesics (pain relievers) or drinking too much coffee may lead to rebound headaches (frequent or severe headaches that can happen if you miss a dose of medication), so take pain medications only as needed. If you think you have these headaches, contact your health care provider.  · Taking too much medication can cause sleep problems or stomach upset. Some over-the-counter headache medications may contain caffeine. These may disrupt sleep and worsen pain.  Relaxation techniques  A , class, book, or tape may help you learn these techniques. One or more of these methods may work for you:  · Deep breathing. Slow, calm, deep breathing can help you relax. Breathe in for a count of 5 or more. Then slowly let the breath out.  · Visualization. Imagining a peaceful, secure scene can give you a sense of control over your body and surroundings.  · Progressive relaxation. This is done by tightening and then releasing muscle groups. Start at the top of your head and work your way down your body. Tighten each muscle group for 5 to 10 seconds. Then release the muscle group for the same amount of time.  · Biofeedback. This is a type of training in which you learn to control certain physical functions and responses. This helps you learn to reduce muscle tension.     Date Last Reviewed: 11/9/2015 © 2000-2017 [employer] [address] [place] [address], [place] [address]. All rights reserved. This information is not intended as a substitute for professional medical care. Always follow your healthcare professional's instructions.      RETURN TO CLINIC IF SYMPTOMS WORSEN OR CALL 911 IMMEDIATELY FOR SHORTNESS OF BREATH, CHEST PAIN, DIZZINESS, WORSENING PAIN, NAUSEA AND VOMITING, HEART PALPITATIONS, FEVER AND/OR NECK STIFFNESS.

## 2019-01-08 ENCOUNTER — APPOINTMENT (OUTPATIENT)
Dept: CT IMAGING | Facility: HOSPITAL | Age: 54
End: 2019-01-08

## 2019-01-08 ENCOUNTER — HOSPITAL ENCOUNTER (EMERGENCY)
Facility: HOSPITAL | Age: 54
Discharge: HOME | End: 2019-01-08
Admitting: EMERGENCY MEDICINE

## 2019-01-08 ENCOUNTER — TELEPHONE (OUTPATIENT)
Dept: URGENT CARE | Facility: CLINIC | Age: 54
End: 2019-01-08

## 2019-01-08 VITALS
HEART RATE: 80 BPM | TEMPERATURE: 98.7 F | SYSTOLIC BLOOD PRESSURE: 118 MMHG | DIASTOLIC BLOOD PRESSURE: 72 MMHG | RESPIRATION RATE: 18 BRPM | WEIGHT: 150.4 LBS | BODY MASS INDEX: 24.17 KG/M2 | HEIGHT: 66 IN | OXYGEN SATURATION: 100 %

## 2019-01-08 DIAGNOSIS — G44.209 TENSION HEADACHE: Primary | ICD-10-CM

## 2019-01-08 DIAGNOSIS — N30.90 CYSTITIS: ICD-10-CM

## 2019-01-08 LAB
ALBUMIN SERPL-MCNC: 3.6 G/DL (ref 3.5–5)
ALBUMIN/GLOB SERPL: 1.4 G/DL (ref 1.1–2.5)
ALP SERPL-CCNC: 99 U/L (ref 24–120)
ALT SERPL W P-5'-P-CCNC: 27 U/L (ref 0–54)
ANION GAP SERPL CALCULATED.3IONS-SCNC: 11 MMOL/L (ref 4–13)
AST SERPL-CCNC: 12 U/L (ref 7–45)
BACTERIA UR QL AUTO: ABNORMAL /HPF
BASOPHILS # BLD AUTO: 0.03 10*3/MM3 (ref 0–0.2)
BASOPHILS NFR BLD AUTO: 0.3 % (ref 0–2)
BILIRUB SERPL-MCNC: 0.1 MG/DL (ref 0.1–1)
BILIRUB UR QL STRIP: NEGATIVE
BUN BLD-MCNC: 14 MG/DL (ref 5–21)
BUN/CREAT SERPL: 19.7 (ref 7–25)
CALCIUM SPEC-SCNC: 9 MG/DL (ref 8.4–10.4)
CHLORIDE SERPL-SCNC: 104 MMOL/L (ref 98–110)
CLARITY UR: CLEAR
CO2 SERPL-SCNC: 27 MMOL/L (ref 24–31)
COLOR UR: YELLOW
CREAT BLD-MCNC: 0.71 MG/DL (ref 0.5–1.4)
DEPRECATED RDW RBC AUTO: 49.4 FL (ref 40–54)
EOSINOPHIL # BLD AUTO: 0.1 10*3/MM3 (ref 0–0.7)
EOSINOPHIL NFR BLD AUTO: 1 % (ref 0–4)
ERYTHROCYTE [DISTWIDTH] IN BLOOD BY AUTOMATED COUNT: 15.6 % (ref 12–15)
GFR SERPL CREATININE-BSD FRML MDRD: 87 ML/MIN/1.73
GLOBULIN UR ELPH-MCNC: 2.6 GM/DL
GLUCOSE BLD-MCNC: 100 MG/DL (ref 70–100)
GLUCOSE UR STRIP-MCNC: NEGATIVE MG/DL
HCT VFR BLD AUTO: 36.4 % (ref 37–47)
HGB BLD-MCNC: 12.1 G/DL (ref 12–16)
HGB UR QL STRIP.AUTO: NEGATIVE
HYALINE CASTS UR QL AUTO: ABNORMAL /LPF
IMM GRANULOCYTES # BLD AUTO: 0.03 10*3/MM3 (ref 0–0.03)
IMM GRANULOCYTES NFR BLD AUTO: 0.3 % (ref 0–5)
KETONES UR QL STRIP: NEGATIVE
LEUKOCYTE ESTERASE UR QL STRIP.AUTO: ABNORMAL
LYMPHOCYTES # BLD AUTO: 2.47 10*3/MM3 (ref 0.72–4.86)
LYMPHOCYTES NFR BLD AUTO: 24.9 % (ref 15–45)
MAGNESIUM SERPL-MCNC: 2.2 MG/DL (ref 1.4–2.2)
MCH RBC QN AUTO: 29 PG (ref 28–32)
MCHC RBC AUTO-ENTMCNC: 33.2 G/DL (ref 33–36)
MCV RBC AUTO: 87.3 FL (ref 82–98)
MONOCYTES # BLD AUTO: 0.47 10*3/MM3 (ref 0.19–1.3)
MONOCYTES NFR BLD AUTO: 4.7 % (ref 4–12)
NEUTROPHILS # BLD AUTO: 6.83 10*3/MM3 (ref 1.87–8.4)
NEUTROPHILS NFR BLD AUTO: 68.8 % (ref 39–78)
NITRITE UR QL STRIP: POSITIVE
NRBC BLD AUTO-RTO: 0 /100 WBC (ref 0–0)
PH UR STRIP.AUTO: 7 [PH] (ref 5–8)
PLATELET # BLD AUTO: 208 10*3/MM3 (ref 130–400)
PMV BLD AUTO: 12 FL (ref 6–12)
POTASSIUM BLD-SCNC: 3.3 MMOL/L (ref 3.5–5.3)
PROT SERPL-MCNC: 6.2 G/DL (ref 6.3–8.7)
PROT UR QL STRIP: NEGATIVE
RBC # BLD AUTO: 4.17 10*6/MM3 (ref 4.2–5.4)
RBC # UR: ABNORMAL /HPF
REF LAB TEST METHOD: ABNORMAL
SODIUM BLD-SCNC: 142 MMOL/L (ref 135–145)
SP GR UR STRIP: 1.01 (ref 1–1.03)
SQUAMOUS #/AREA URNS HPF: ABNORMAL /HPF
UROBILINOGEN UR QL STRIP: ABNORMAL
WBC NRBC COR # BLD: 9.93 10*3/MM3 (ref 4.8–10.8)
WBC UR QL AUTO: ABNORMAL /HPF

## 2019-01-08 PROCEDURE — 85025 COMPLETE CBC W/AUTO DIFF WBC: CPT | Performed by: PHYSICIAN ASSISTANT

## 2019-01-08 PROCEDURE — 70450 CT HEAD/BRAIN W/O DYE: CPT

## 2019-01-08 PROCEDURE — 81001 URINALYSIS AUTO W/SCOPE: CPT | Performed by: PHYSICIAN ASSISTANT

## 2019-01-08 PROCEDURE — 83735 ASSAY OF MAGNESIUM: CPT | Performed by: PHYSICIAN ASSISTANT

## 2019-01-08 PROCEDURE — 96375 TX/PRO/DX INJ NEW DRUG ADDON: CPT

## 2019-01-08 PROCEDURE — 25010000002 DEXAMETHASONE PER 1 MG: Performed by: PHYSICIAN ASSISTANT

## 2019-01-08 PROCEDURE — 25010000002 PROCHLORPERAZINE EDISYLATE PER 10 MG: Performed by: PHYSICIAN ASSISTANT

## 2019-01-08 PROCEDURE — 87086 URINE CULTURE/COLONY COUNT: CPT | Performed by: PHYSICIAN ASSISTANT

## 2019-01-08 PROCEDURE — 96374 THER/PROPH/DIAG INJ IV PUSH: CPT

## 2019-01-08 PROCEDURE — 25010000002 DIPHENHYDRAMINE PER 50 MG: Performed by: PHYSICIAN ASSISTANT

## 2019-01-08 PROCEDURE — 87186 SC STD MICRODIL/AGAR DIL: CPT | Performed by: PHYSICIAN ASSISTANT

## 2019-01-08 PROCEDURE — 99283 EMERGENCY DEPT VISIT LOW MDM: CPT

## 2019-01-08 PROCEDURE — 80053 COMPREHEN METABOLIC PANEL: CPT | Performed by: PHYSICIAN ASSISTANT

## 2019-01-08 PROCEDURE — 87088 URINE BACTERIA CULTURE: CPT | Performed by: PHYSICIAN ASSISTANT

## 2019-01-08 RX ORDER — DEXAMETHASONE SODIUM PHOSPHATE 10 MG/ML
10 INJECTION INTRAMUSCULAR; INTRAVENOUS ONCE
Status: COMPLETED | OUTPATIENT
Start: 2019-01-08 | End: 2019-01-08

## 2019-01-08 RX ORDER — DIPHENHYDRAMINE HYDROCHLORIDE 50 MG/ML
25 INJECTION INTRAMUSCULAR; INTRAVENOUS ONCE
Status: COMPLETED | OUTPATIENT
Start: 2019-01-08 | End: 2019-01-08

## 2019-01-08 RX ORDER — BUTALBITAL, ACETAMINOPHEN AND CAFFEINE 50; 325; 40 MG/1; MG/1; MG/1
1 TABLET ORAL EVERY 4 HOURS PRN
COMMUNITY
Start: 2019-01-05 | End: 2019-03-01

## 2019-01-08 RX ORDER — SULFAMETHOXAZOLE AND TRIMETHOPRIM 800; 160 MG/1; MG/1
1 TABLET ORAL 2 TIMES DAILY
Qty: 20 TABLET | Refills: 0 | Status: SHIPPED | OUTPATIENT
Start: 2019-01-08 | End: 2019-03-01

## 2019-01-08 RX ADMIN — DIPHENHYDRAMINE HYDROCHLORIDE 25 MG: 50 INJECTION INTRAMUSCULAR; INTRAVENOUS at 21:51

## 2019-01-08 RX ADMIN — DEXAMETHASONE SODIUM PHOSPHATE 10 MG: 10 INJECTION INTRAMUSCULAR; INTRAVENOUS at 21:51

## 2019-01-08 RX ADMIN — PROCHLORPERAZINE EDISYLATE 10 MG: 5 INJECTION INTRAMUSCULAR; INTRAVENOUS at 21:51

## 2019-01-09 NOTE — ED PROVIDER NOTES
Subjective   History of Present Illness  53-year-old female presents with a chief complaint of headache for 2 weeks.  The patient reports her having headaches began in her forehead that had been constant up until January 5.  She went to a walk-in clinic and had received a shot of Toradol and had been prescribed Fioricet.  Patient reports she has not been taking Fioricet.  Headache has come back today and is worse.  She reports is like a pressure band around her head and symptoms are all over.  No nausea vomiting diarrhea cough chest pain shortness of breath.  Review of Systems   All other systems reviewed and are negative.      Past Medical History:   Diagnosis Date   • Esophagitis determined by endoscopy        No Known Allergies    Past Surgical History:   Procedure Laterality Date   • APPENDECTOMY     • HAND TENDON SURGERY     • HYSTERECTOMY     • OOPHORECTOMY     • TONSILLECTOMY         Family History   Problem Relation Age of Onset   • Hypertension Father    • Heart disease Paternal Grandmother    • Colon cancer Mother    • No Known Problems Sister    • Breast cancer Neg Hx    • Ovarian cancer Neg Hx        Social History     Socioeconomic History   • Marital status:      Spouse name: Not on file   • Number of children: Not on file   • Years of education: Not on file   • Highest education level: Not on file   Tobacco Use   • Smoking status: Current Every Day Smoker     Packs/day: 1.00   • Smokeless tobacco: Never Used   Substance and Sexual Activity   • Alcohol use: No   • Drug use: No   • Sexual activity: Yes     Birth control/protection: Surgical           Objective   Physical Exam   Constitutional: She is oriented to person, place, and time. She appears well-developed and well-nourished.   HENT:   Head: Normocephalic and atraumatic.   Eyes: EOM are normal. Pupils are equal, round, and reactive to light.   Neck: Normal range of motion. Neck supple.   Cardiovascular: Normal rate and regular rhythm.    Pulmonary/Chest: Effort normal and breath sounds normal.   Abdominal: Soft. Bowel sounds are normal.   Musculoskeletal: Normal range of motion.   Neurological: She is alert and oriented to person, place, and time. No cranial nerve deficit. Coordination normal.   Skin: Skin is warm and dry.   Psychiatric: She has a normal mood and affect. Her behavior is normal.   Nursing note and vitals reviewed.      Procedures           ED Course        Labs Reviewed   COMPREHENSIVE METABOLIC PANEL - Abnormal; Notable for the following components:       Result Value    Potassium 3.3 (*)     Total Protein 6.2 (*)     All other components within normal limits   CBC WITH AUTO DIFFERENTIAL - Abnormal; Notable for the following components:    RBC 4.17 (*)     Hematocrit 36.4 (*)     RDW 15.6 (*)     All other components within normal limits   URINALYSIS W/ CULTURE IF INDICATED - Abnormal; Notable for the following components:    Leuk Esterase, UA Small (1+) (*)     Nitrite, UA Positive (*)     All other components within normal limits   URINALYSIS, MICROSCOPIC ONLY - Abnormal; Notable for the following components:    RBC, UA 0-2 (*)     WBC, UA 6-12 (*)     Bacteria, UA 4+ (*)     Squamous Epithelial Cells, UA 3-6 (*)     All other components within normal limits   MAGNESIUM - Normal   URINE CULTURE   CBC AND DIFFERENTIAL    Narrative:     The following orders were created for panel order CBC & Differential.  Procedure                               Abnormality         Status                     ---------                               -----------         ------                     CBC Auto Differential[[id number]]        Abnormal            Final result                 Please view results for these tests on the individual orders.     CT Head Without Contrast   Final Result   1. No hemorrhage, edema or mass effect. No acute findings.   2. Minimal atrophy.       The full report of this exam was immediately signed and available to the    emergency room. The patient is currently in the emergency room.       This report was finalized on 01/08/2019 21:45 by Dr. Graham Liang MD.                  MDM  Number of Diagnoses or Management Options  Cystitis: new and requires workup  Tension headache: new and requires workup  Diagnosis management comments: Negative CT, labs stable, pain improved, patient resting comfortably, no complaints, will dc in stable condition        Amount and/or Complexity of Data Reviewed  Clinical lab tests: ordered and reviewed  Tests in the radiology section of CPT®: ordered and reviewed  Tests in the medicine section of CPT®: ordered and reviewed    Risk of Complications, Morbidity, and/or Mortality  Presenting problems: moderate  Diagnostic procedures: moderate  Management options: moderate    Patient Progress  Patient progress: stable        Final diagnoses:   Tension headache   Cystitis            Edgar Gomez PA-C  01/09/19 [id number]

## 2019-01-10 LAB — BACTERIA SPEC AEROBE CULT: ABNORMAL

## 2019-01-11 ENCOUNTER — HOSPITAL ENCOUNTER (OUTPATIENT)
Dept: CARDIOLOGY | Facility: HOSPITAL | Age: 54
Discharge: HOME | End: 2019-01-11
Attending: INTERNAL MEDICINE | Admitting: INTERNAL MEDICINE

## 2019-01-11 VITALS
DIASTOLIC BLOOD PRESSURE: 81 MMHG | BODY MASS INDEX: 23.78 KG/M2 | SYSTOLIC BLOOD PRESSURE: 110 MMHG | WEIGHT: 148 LBS | HEART RATE: 86 BPM | HEIGHT: 66 IN

## 2019-01-11 DIAGNOSIS — R07.89 ATYPICAL CHEST PAIN: ICD-10-CM

## 2019-01-11 PROCEDURE — 93352 ADMIN ECG CONTRAST AGENT: CPT | Performed by: INTERNAL MEDICINE

## 2019-01-11 PROCEDURE — 93350 STRESS TTE ONLY: CPT | Performed by: INTERNAL MEDICINE

## 2019-01-11 PROCEDURE — 25010000002 PERFLUTREN 6.52 MG/ML SUSPENSION: Performed by: INTERNAL MEDICINE

## 2019-01-11 PROCEDURE — 93018 CV STRESS TEST I&R ONLY: CPT | Performed by: INTERNAL MEDICINE

## 2019-01-11 PROCEDURE — 93017 CV STRESS TEST TRACING ONLY: CPT

## 2019-01-11 PROCEDURE — 93350 STRESS TTE ONLY: CPT

## 2019-01-11 RX ADMIN — PERFLUTREN 8.48 MG: 6.52 INJECTION, SUSPENSION INTRAVENOUS at 09:25

## 2019-01-13 LAB
BH CV STRESS BP STAGE 1: NORMAL
BH CV STRESS BP STAGE 2: NORMAL
BH CV STRESS BP STAGE 3: NORMAL
BH CV STRESS DURATION MIN STAGE 1: 3
BH CV STRESS DURATION MIN STAGE 2: 3
BH CV STRESS DURATION MIN STAGE 3: 1
BH CV STRESS DURATION SEC STAGE 1: 0
BH CV STRESS DURATION SEC STAGE 2: 0
BH CV STRESS DURATION SEC STAGE 3: 45
BH CV STRESS GRADE STAGE 1: 10
BH CV STRESS GRADE STAGE 2: 12
BH CV STRESS GRADE STAGE 3: 14
BH CV STRESS HR STAGE 1: 112
BH CV STRESS HR STAGE 2: 127
BH CV STRESS HR STAGE 3: 155
BH CV STRESS METS STAGE 1: 5
BH CV STRESS METS STAGE 2: 7.5
BH CV STRESS METS STAGE 3: 10
BH CV STRESS PROTOCOL 1: NORMAL
BH CV STRESS RECOVERY BP: NORMAL MMHG
BH CV STRESS RECOVERY HR: 88 BPM
BH CV STRESS SPEED STAGE 1: 1.7
BH CV STRESS SPEED STAGE 2: 2.5
BH CV STRESS SPEED STAGE 3: 3.4
BH CV STRESS STAGE 1: 1
BH CV STRESS STAGE 2: 2
BH CV STRESS STAGE 3: 3
MAXIMAL PREDICTED HEART RATE: 169 BPM
PERCENT MAX PREDICTED HR: 91.72 %
STRESS BASELINE BP: NORMAL MMHG
STRESS BASELINE HR: 86 BPM
STRESS PERCENT HR: 108 %
STRESS POST ESTIMATED WORKLOAD: 10 METS
STRESS POST EXERCISE DUR MIN: 7 MIN
STRESS POST EXERCISE DUR SEC: 45 SEC
STRESS POST PEAK BP: NORMAL MMHG
STRESS POST PEAK HR: 155 BPM
STRESS TARGET HR: 144 BPM

## 2019-01-21 ENCOUNTER — APPOINTMENT (OUTPATIENT)
Dept: CT IMAGING | Facility: HOSPITAL | Age: 54
End: 2019-01-21

## 2019-01-21 ENCOUNTER — HOSPITAL ENCOUNTER (EMERGENCY)
Facility: HOSPITAL | Age: 54
Discharge: HOME | End: 2019-01-21
Admitting: EMERGENCY MEDICINE

## 2019-01-21 VITALS
TEMPERATURE: 98.7 F | HEART RATE: 99 BPM | OXYGEN SATURATION: 98 % | WEIGHT: 144.4 LBS | BODY MASS INDEX: 23.21 KG/M2 | DIASTOLIC BLOOD PRESSURE: 84 MMHG | RESPIRATION RATE: 17 BRPM | HEIGHT: 66 IN | SYSTOLIC BLOOD PRESSURE: 119 MMHG

## 2019-01-21 DIAGNOSIS — R10.12 ABDOMINAL PAIN, LEFT UPPER QUADRANT: Primary | ICD-10-CM

## 2019-01-21 LAB
ALBUMIN SERPL-MCNC: 4.8 G/DL (ref 3.5–5)
ALBUMIN/GLOB SERPL: 1.7 G/DL (ref 1.1–2.5)
ALP SERPL-CCNC: 157 U/L (ref 24–120)
ALT SERPL W P-5'-P-CCNC: 28 U/L (ref 0–54)
AMYLASE SERPL-CCNC: 39 U/L (ref 30–110)
ANION GAP SERPL CALCULATED.3IONS-SCNC: 10 MMOL/L (ref 4–13)
AST SERPL-CCNC: 17 U/L (ref 7–45)
BASOPHILS # BLD AUTO: 0.07 10*3/MM3 (ref 0–0.2)
BASOPHILS NFR BLD AUTO: 0.6 % (ref 0–2)
BILIRUB SERPL-MCNC: 0.6 MG/DL (ref 0.1–1)
BUN BLD-MCNC: 11 MG/DL (ref 5–21)
BUN/CREAT SERPL: 15.3 (ref 7–25)
CALCIUM SPEC-SCNC: 9.6 MG/DL (ref 8.4–10.4)
CHLORIDE SERPL-SCNC: 100 MMOL/L (ref 98–110)
CO2 SERPL-SCNC: 31 MMOL/L (ref 24–31)
CREAT BLD-MCNC: 0.72 MG/DL (ref 0.5–1.4)
DEPRECATED RDW RBC AUTO: 48.5 FL (ref 40–54)
EOSINOPHIL # BLD AUTO: 0.22 10*3/MM3 (ref 0–0.7)
EOSINOPHIL NFR BLD AUTO: 1.7 % (ref 0–4)
ERYTHROCYTE [DISTWIDTH] IN BLOOD BY AUTOMATED COUNT: 15.5 % (ref 12–15)
GFR SERPL CREATININE-BSD FRML MDRD: 85 ML/MIN/1.73
GLOBULIN UR ELPH-MCNC: 2.8 GM/DL
GLUCOSE BLD-MCNC: 99 MG/DL (ref 70–100)
HCT VFR BLD AUTO: 43.6 % (ref 37–47)
HGB BLD-MCNC: 14.3 G/DL (ref 12–16)
IMM GRANULOCYTES # BLD AUTO: 0.02 10*3/MM3 (ref 0–0.03)
IMM GRANULOCYTES NFR BLD AUTO: 0.2 % (ref 0–5)
LIPASE SERPL-CCNC: 38 U/L (ref 23–203)
LYMPHOCYTES # BLD AUTO: 2.32 10*3/MM3 (ref 0.72–4.86)
LYMPHOCYTES NFR BLD AUTO: 18.3 % (ref 15–45)
MCH RBC QN AUTO: 28.3 PG (ref 28–32)
MCHC RBC AUTO-ENTMCNC: 32.8 G/DL (ref 33–36)
MCV RBC AUTO: 86.3 FL (ref 82–98)
MONOCYTES # BLD AUTO: 0.51 10*3/MM3 (ref 0.19–1.3)
MONOCYTES NFR BLD AUTO: 4 % (ref 4–12)
NEUTROPHILS # BLD AUTO: 9.53 10*3/MM3 (ref 1.87–8.4)
NEUTROPHILS NFR BLD AUTO: 75.2 % (ref 39–78)
NRBC BLD AUTO-RTO: 0 /100 WBC (ref 0–0)
PLATELET # BLD AUTO: 292 10*3/MM3 (ref 130–400)
PMV BLD AUTO: 11.5 FL (ref 6–12)
POTASSIUM BLD-SCNC: 3.6 MMOL/L (ref 3.5–5.3)
PROT SERPL-MCNC: 7.6 G/DL (ref 6.3–8.7)
RBC # BLD AUTO: 5.05 10*6/MM3 (ref 4.2–5.4)
SODIUM BLD-SCNC: 141 MMOL/L (ref 135–145)
TROPONIN I SERPL-MCNC: <0.012 NG/ML (ref 0–0.03)
WBC NRBC COR # BLD: 12.67 10*3/MM3 (ref 4.8–10.8)

## 2019-01-21 PROCEDURE — 25010000002 IOPAMIDOL 61 % SOLUTION: Performed by: NURSE PRACTITIONER

## 2019-01-21 PROCEDURE — 93010 ELECTROCARDIOGRAM REPORT: CPT | Performed by: INTERNAL MEDICINE

## 2019-01-21 PROCEDURE — 84484 ASSAY OF TROPONIN QUANT: CPT | Performed by: NURSE PRACTITIONER

## 2019-01-21 PROCEDURE — 93005 ELECTROCARDIOGRAM TRACING: CPT

## 2019-01-21 PROCEDURE — 99283 EMERGENCY DEPT VISIT LOW MDM: CPT

## 2019-01-21 PROCEDURE — 80053 COMPREHEN METABOLIC PANEL: CPT | Performed by: NURSE PRACTITIONER

## 2019-01-21 PROCEDURE — 83690 ASSAY OF LIPASE: CPT | Performed by: NURSE PRACTITIONER

## 2019-01-21 PROCEDURE — 74177 CT ABD & PELVIS W/CONTRAST: CPT

## 2019-01-21 PROCEDURE — 82150 ASSAY OF AMYLASE: CPT | Performed by: NURSE PRACTITIONER

## 2019-01-21 PROCEDURE — 85025 COMPLETE CBC W/AUTO DIFF WBC: CPT | Performed by: NURSE PRACTITIONER

## 2019-01-21 RX ADMIN — IOPAMIDOL 100 ML: 612 INJECTION, SOLUTION INTRAVENOUS at 12:56

## 2019-01-21 NOTE — ED NOTES
Patient spoken to about symptoms reports chest pains for over a year and a recent rule of cardiac issues.     Ruba East RN  01/21/19 [id number]

## 2019-01-21 NOTE — ED PROVIDER NOTES
"Subjective   Patient is a 54 yo female presents to the ER with complaints of chronic abdominal pain. She complains of a discomfort and almost like a \"stitch\" to her left upper quadrant for nearly 2 months. She relates she had diarrhea and nausea last week but this has since resolved. She states at times the pain has been in the epigastric region as well. She states that she had both shingles and Mononucleosis in December as well and is uncertain if this is related to her abdominal pain. She admits to increased life stressors with recent passing of family members and a heavy work load. She also states within the past several months prior to this pain she had more of epigastric pain that radiated into her chest; however, she had a stress study and was evaluated by Dr. Llanos which she was cleared for cardiac issues. She was evaluated in March by Dr. Parada with GI in [place] and had endoscopy that was relatively unremarkable. She had cholecystectomy by Dr. James in May of this year as well. Patient is concerned about this continued unknown abdominal pain. She reports family history for pancreatic cancer. As mentioned above she describes a lot of life stressors which may be contributing factors to the pain. She plans to see GI in [place] as her specialist is planning to retire however due to ongoing discomfort she elected to come to the ER for evaluation and treatment.         Abdominal Pain   Pain location:  LUQ  Pain quality: sharp    Pain severity:  Moderate  Timing:  Intermittent  Chronicity:  Chronic  Worsened by:  Nothing  Ineffective treatments: patient takes daily ppi medication dexilant and pentasa.  Associated symptoms: diarrhea and nausea    Associated symptoms: no chest pain, no cough, no fever, no shortness of breath and no vomiting        Review of Systems   Constitutional: Negative for fever.   HENT: Negative for congestion.    Respiratory: Negative for cough and shortness of breath.  "   Cardiovascular: Negative for chest pain.   Gastrointestinal: Positive for abdominal pain, diarrhea and nausea. Negative for vomiting.   Musculoskeletal: Negative for back pain, gait problem, joint swelling, myalgias, neck pain and neck stiffness.   Skin: Negative for color change, pallor, rash and wound.   All other systems reviewed and are negative.      Past Medical History:   Diagnosis Date   • Esophagitis determined by endoscopy        No Known Allergies    Past Surgical History:   Procedure Laterality Date   • APPENDECTOMY     • HAND TENDON SURGERY     • HYSTERECTOMY     • OOPHORECTOMY     • TONSILLECTOMY         Family History   Problem Relation Age of Onset   • Hypertension Father    • Heart disease Paternal Grandmother    • Colon cancer Mother    • No Known Problems Sister    • Breast cancer Neg Hx    • Ovarian cancer Neg Hx        Social History     Socioeconomic History   • Marital status:      Spouse name: Not on file   • Number of children: Not on file   • Years of education: Not on file   • Highest education level: Not on file   Tobacco Use   • Smoking status: Current Every Day Smoker     Packs/day: 1.00   • Smokeless tobacco: Never Used   Substance and Sexual Activity   • Alcohol use: No   • Drug use: No   • Sexual activity: Yes     Birth control/protection: Surgical           Objective   Physical Exam   Constitutional: She is oriented to person, place, and time. She appears well-developed and well-nourished.   HENT:   Head: Normocephalic and atraumatic.   Nose: Nose normal.   Mouth/Throat: Oropharynx is clear and moist.   Eyes: Conjunctivae and EOM are normal. Pupils are equal, round, and reactive to light.   Neck: Normal range of motion. Neck supple.   Cardiovascular: Normal rate, regular rhythm, normal heart sounds and intact distal pulses.   Pulmonary/Chest: Effort normal and breath sounds normal.   Abdominal: Soft. Normal appearance, normal aorta and bowel sounds are normal. There is  tenderness in the left upper quadrant. There is no rebound.   Musculoskeletal: Normal range of motion.   Neurological: She is alert and oriented to person, place, and time.   Skin: Skin is warm and dry. Capillary refill takes less than 2 seconds.   Psychiatric: She has a normal mood and affect. Her behavior is normal.   Nursing note and vitals reviewed.      Procedures           ED Course patient is resting and in no distress on re-exam. Labs are unremarkable including normal CT of abdomen and pelvis. She inquired if these sxs may be secondary to the medication she takes both dexilant and pentasa. Explained medications can sometimes cause abdominal discomfort as can increased stress. Recommend close f/u with GI for further evaluation and to return with any acute or worsening sxs.                    MDM  Number of Diagnoses or Management Options  Abdominal pain, left upper quadrant: new and requires workup     Amount and/or Complexity of Data Reviewed  Clinical lab tests: ordered and reviewed  Tests in the radiology section of CPT®: ordered and reviewed  Discuss the patient with other providers: yes    Risk of Complications, Morbidity, and/or Mortality  Presenting problems: moderate  Diagnostic procedures: moderate  Management options: moderate    Patient Progress  Patient progress: improved        Final diagnoses:   Abdominal pain, left upper quadrant            Emerald Edge, APRN  01/21/19 9801

## 2019-01-28 NOTE — ED NOTES
"ED Call Back Questions    1. How are you doing since leaving the Emergency Department?    Doing well, gi appt scheduled  2. Do you have any questions about your discharge instructions? No     3. Have you filled your new prescriptions yet? N/A  a. Do you have any questions about those medications? N/A    4. Were you able to make a follow-up appointment with the physician? Yes     5. Do you have a primary care physician? Yes   a. If No, would you like for me to set you up with one? N/A  i. If Yes, “I will have our ED  give you a call right back at this number to work with you on the best time for an appointment.”    6. We are always looking to get better at what we do. Do you have any suggestions for what we can do to be even better? No   a. If Yes, \"Thank you for sharing your concerns. I apologize. I will follow up with our manager and patient . Would you like someone to call you back?\" N/A    7. Is there anything else I can do for you? No     "

## 2019-03-01 ENCOUNTER — OFFICE VISIT (OUTPATIENT)
Dept: OBSTETRICS AND GYNECOLOGY | Facility: CLINIC | Age: 54
End: 2019-03-01

## 2019-03-01 VITALS
WEIGHT: 145 LBS | BODY MASS INDEX: 23.3 KG/M2 | DIASTOLIC BLOOD PRESSURE: 70 MMHG | SYSTOLIC BLOOD PRESSURE: 100 MMHG | HEIGHT: 66 IN

## 2019-03-01 DIAGNOSIS — Z12.39 ENCOUNTER FOR SCREENING FOR MALIGNANT NEOPLASM OF BREAST: ICD-10-CM

## 2019-03-01 DIAGNOSIS — N64.4 MASTODYNIA OF LEFT BREAST: ICD-10-CM

## 2019-03-01 DIAGNOSIS — Z01.419 WELL WOMAN EXAM WITH ROUTINE GYNECOLOGICAL EXAM: Primary | ICD-10-CM

## 2019-03-01 PROCEDURE — 99396 PREV VISIT EST AGE 40-64: CPT | Performed by: NURSE PRACTITIONER

## 2019-03-01 RX ORDER — ALPRAZOLAM 0.5 MG/1
0.25 TABLET ORAL
COMMUNITY
End: 2019-03-01

## 2019-03-01 RX ORDER — HYDROCODONE BITARTRATE AND ACETAMINOPHEN 7.5; 325 MG/1; MG/1
1 TABLET ORAL EVERY 6 HOURS PRN
COMMUNITY
End: 2019-09-10

## 2019-03-01 RX ORDER — DEXLANSOPRAZOLE 60 MG/1
60 CAPSULE, DELAYED RELEASE ORAL DAILY
COMMUNITY
Start: 2018-06-12 | End: 2019-04-11 | Stop reason: ALTCHOICE

## 2019-03-01 NOTE — PROGRESS NOTES
Neena Aranda is a 54 y.o. female  YOB: 1965        Chief Complaint   Patient presents with   • Gynecologic Exam     Left breast pain.       Gynecologic Exam   The patient's pertinent negatives include no genital itching, genital lesions, genital odor, genital rash, missed menses, pelvic pain, vaginal bleeding or vaginal discharge. Pertinent negatives include no abdominal pain, back pain, constipation, diarrhea, dysuria, fever, frequency, hematuria, nausea, rash, sore throat, urgency or vomiting.       The following portions of the patient's history were reviewed and updated as appropriate: allergies, current medications, past family history, past medical history, past social history, past surgical history and problem list.    No Known Allergies    Past Medical History:   Diagnosis Date   • Crohn's colitis (CMS/[facility])    • Esophagitis determined by endoscopy        Family History   Problem Relation Age of Onset   • Hypertension Father    • Heart disease Paternal Grandmother    • Colon cancer Mother    • No Known Problems Sister    • Breast cancer Neg Hx    • Ovarian cancer Neg Hx        Social History     Socioeconomic History   • Marital status:      Spouse name: Not on file   • Number of children: Not on file   • Years of education: Not on file   • Highest education level: Not on file   Social Needs   • Financial resource strain: Not on file   • Food insecurity - worry: Not on file   • Food insecurity - inability: Not on file   • Transportation needs - medical: Not on file   • Transportation needs - non-medical: Not on file   Occupational History   • Not on file   Tobacco Use   • Smoking status: Current Every Day Smoker     Packs/day: 1.00   • Smokeless tobacco: Never Used   Substance and Sexual Activity   • Alcohol use: No   • Drug use: No   • Sexual activity: Yes     Birth control/protection: Surgical   Other Topics Concern   • Not on file   Social History Narrative   • Not on  file         Current Outpatient Medications:   •  ALPRAZolam (XANAX) 0.5 MG tablet, Take 0.25 mg by mouth At Night As Needed for Anxiety., Disp: , Rfl:   •  Dexlansoprazole (DEXILANT PO), , Disp: , Rfl:   •  HYDROcodone-acetaminophen (NORCO) 7.5-325 MG per tablet, Take 1 tablet by mouth Every 6 (Six) Hours As Needed for Moderate Pain ., Disp: , Rfl:   •  PENTASA 500 MG CR capsule, 2,000 mg 2 (Two) Times a Day., Disp: , Rfl:     No LMP recorded. Patient has had a hysterectomy.    Sexual History:         Could not be calculated    Past Surgical History:   Procedure Laterality Date   • APPENDECTOMY     • CHOLECYSTECTOMY     • COLONOSCOPY  05/2018   • HAND TENDON SURGERY     • HYSTERECTOMY     • OOPHORECTOMY     • TONSILLECTOMY         Review of Systems   Constitutional: Negative for activity change, appetite change, fatigue, fever, unexpected weight gain and unexpected weight loss.   HENT: Negative for congestion, ear pain, hearing loss, nosebleeds, rhinorrhea, sore throat, tinnitus and trouble swallowing.    Eyes: Negative for blurred vision, pain, discharge, itching and visual disturbance.   Respiratory: Negative for apnea, chest tightness, shortness of breath and wheezing.    Cardiovascular: Negative for chest pain and leg swelling.   Gastrointestinal: Negative for abdominal pain, blood in stool, constipation, diarrhea, nausea, vomiting and GERD.   Endocrine: Negative for heat intolerance, polydipsia and polyuria.   Genitourinary: Negative for urinary incontinence, decreased libido, difficulty urinating, dyspareunia, dysuria, frequency, genital sores, hematuria, menstrual problem, missed menses, pelvic pain, urgency, vaginal discharge, vaginal pain and breast lump.        Mastodynia, left     Musculoskeletal: Negative for arthralgias, back pain, joint swelling and myalgias.   Skin: Negative for color change, rash and skin lesions.   Allergic/Immunologic: Negative for environmental allergies, food allergies and  immunocompromised state.   Neurological: Negative for dizziness, tremors, seizures, syncope, facial asymmetry, numbness and headache.   Hematological: Negative for adenopathy. Does not bruise/bleed easily.   Psychiatric/Behavioral: Negative for agitation, hallucinations, sleep disturbance, suicidal ideas and depressed mood. The patient is not nervous/anxious.        Objective   Physical Exam   Constitutional: She is oriented to person, place, and time. She appears well-developed and well-nourished.   HENT:   Head: Normocephalic and atraumatic.   Right Ear: External ear normal.   Left Ear: External ear normal.   Eyes: Conjunctivae and EOM are normal. Right eye exhibits no discharge. Left eye exhibits no discharge. No scleral icterus.   Neck: Normal range of motion. Neck supple. Carotid bruit is not present. No thyromegaly present.   Cardiovascular: Regular rhythm and normal heart sounds.   No murmur heard.  Pulmonary/Chest: Effort normal and breath sounds normal. No respiratory distress. Right breast exhibits no inverted nipple, no mass, no nipple discharge, no skin change and no tenderness. Left breast exhibits no inverted nipple, no mass, no nipple discharge, no skin change and no tenderness. Breasts are symmetrical. There is no breast swelling.   Abdominal: Soft. Bowel sounds are normal. She exhibits no distension and no mass. There is no tenderness. There is no guarding. No hernia. Hernia confirmed negative in the right inguinal area and confirmed negative in the left inguinal area.   Genitourinary: Vagina normal. Rectal exam shows no mass. No breast tenderness, discharge or bleeding. There is no rash, tenderness, lesion or injury on the right labia. There is no rash, tenderness, lesion or injury on the left labia. No erythema or bleeding in the vagina. No signs of injury around the vagina. No vaginal discharge found.   Genitourinary Comments: Cervix, Uterus and Adnexa are surgically absent.  Urethra and urethral  "meatus normal  Bladder, normal no prolapse  Perineum and anus examined and without lesions   Musculoskeletal: Normal range of motion. She exhibits no edema or tenderness.   Lymphadenopathy:        Head (right side): No submental, no submandibular, no tonsillar, no preauricular, no posterior auricular and no occipital adenopathy present.        Head (left side): No submental, no submandibular, no tonsillar, no preauricular, no posterior auricular and no occipital adenopathy present.     She has no cervical adenopathy.        Right cervical: No superficial cervical, no deep cervical and no posterior cervical adenopathy present.       Left cervical: No superficial cervical, no deep cervical and no posterior cervical adenopathy present.     She has no axillary adenopathy.        Right: No inguinal adenopathy present.        Left: No inguinal adenopathy present.   Neurological: She is alert and oriented to person, place, and time. She exhibits normal muscle tone. Coordination normal.   Skin: Skin is warm and dry. No bruising and no rash noted. No erythema.   Psychiatric: She has a normal mood and affect. Her behavior is normal. Judgment and thought content normal.   Nursing note and vitals reviewed.        Vitals:    03/01/19 0847   BP: 100/70   Weight: 65.8 kg (145 lb)   Height: 167.6 cm (66\")       Maria Victoria was seen today for gynecologic exam.    Diagnoses and all orders for this visit:    Well woman exam with routine gynecological exam  Comments:  Normal well woman exam.      Encounter for screening for malignant neoplasm of breast  Comments:  Mammogram ordered.  Orders:  -     Mammo Screening Digital Tomosynthesis Bilateral With CAD; Future    Mastodynia of left breast  Comments:  Patient reports left breast pain times >1 year.  Imaging done in the past has been negative.  Mammogram ordered - will follow up pending results.           Patient's Body mass index is 23.4 kg/m². BMI is within normal parameters. No follow-up " required..             Non-Smoker    MyChart Instructions Given

## 2019-03-05 ENCOUNTER — HOSPITAL ENCOUNTER (OUTPATIENT)
Dept: MAMMOGRAPHY | Facility: HOSPITAL | Age: 54
Discharge: HOME | End: 2019-03-05
Admitting: NURSE PRACTITIONER

## 2019-03-05 DIAGNOSIS — Z12.39 ENCOUNTER FOR SCREENING FOR MALIGNANT NEOPLASM OF BREAST: ICD-10-CM

## 2019-03-05 PROCEDURE — 77067 SCR MAMMO BI INCL CAD: CPT

## 2019-03-05 PROCEDURE — 77063 BREAST TOMOSYNTHESIS BI: CPT

## 2019-04-11 ENCOUNTER — OFFICE VISIT (OUTPATIENT)
Dept: GASTROENTEROLOGY | Facility: CLINIC | Age: 54
End: 2019-04-11

## 2019-04-11 VITALS
TEMPERATURE: 97.9 F | HEIGHT: 66 IN | OXYGEN SATURATION: 98 % | SYSTOLIC BLOOD PRESSURE: 120 MMHG | BODY MASS INDEX: 24.08 KG/M2 | DIASTOLIC BLOOD PRESSURE: 80 MMHG | HEART RATE: 67 BPM | WEIGHT: 149.8 LBS

## 2019-04-11 DIAGNOSIS — Z72.0 TOBACCO USE: ICD-10-CM

## 2019-04-11 DIAGNOSIS — Z83.71 FAMILY HX COLONIC POLYPS: ICD-10-CM

## 2019-04-11 DIAGNOSIS — Z80.0 FAMILY HX OF COLON CANCER: ICD-10-CM

## 2019-04-11 DIAGNOSIS — K50.919 CROHN'S DISEASE WITH COMPLICATION, UNSPECIFIED GASTROINTESTINAL TRACT LOCATION (HCC): ICD-10-CM

## 2019-04-11 DIAGNOSIS — K21.9 GASTROESOPHAGEAL REFLUX DISEASE, ESOPHAGITIS PRESENCE NOT SPECIFIED: Primary | ICD-10-CM

## 2019-04-11 PROCEDURE — 99214 OFFICE O/P EST MOD 30 MIN: CPT | Performed by: NURSE PRACTITIONER

## 2019-04-11 RX ORDER — PANTOPRAZOLE SODIUM 40 MG/1
40 TABLET, DELAYED RELEASE ORAL DAILY
Qty: 30 TABLET | Refills: 11 | Status: SHIPPED | OUTPATIENT
Start: 2019-04-11 | End: 2020-04-03 | Stop reason: SDUPTHER

## 2019-04-11 RX ORDER — NITROFURANTOIN 25; 75 MG/1; MG/1
1 CAPSULE ORAL DAILY
COMMUNITY
Start: 2019-04-08 | End: 2019-05-30

## 2019-04-11 NOTE — PROGRESS NOTES
[facility] GASTROENTEROLOGY - OFFICE NOTE    4/11/2019    Maria Victoria Aranda   1965    Primary Physician: Paolo Murcia MD    Chief Complaint   Patient presents with   • Heartburn   crohns       HISTORY OF PRESENT ILLNESS    Maria Victoria Aranda is a 54 y.o. female presents  with reflux.  Symptoms are mainly of a burning sensation of the chest as well as an aching sensation.  She has had trouble with this intermittently over the last year.  She has been on Dexilant over the last year which does not seem to control her symptoms.  She has tried Prevacid with no help.  Was on Protonix several years ago which did control her symptoms but her insurance would not pay for it.  Certain foods that can trigger spicy and greasy foods.  She does smoke.  States that she is trying to quit.  She is decreasing her caffeine intake as well.    She denies any exertional pain.  No dysphagia.  No nausea vomiting.  No abdominal pain.  No unintentional weight loss.    Has had EGD in the past by Dr. Parada there was question of Drake's in the past per patient history.  Her last upper endoscopy by Dr. Parada was last year.    States that she was diagnosed with Crohn's disease 20 years ago.  Has been followed by Dr. Parada.  Has taken Pentasa over the last 20 years. Has never taken any other meds or steroids for crohns.   She does move her bowels daily.  No rectal bleeding.  No weight loss.  No nausea vomiting.  No abdominal pain.  No skin rash.  No joint pain.  No ocular pain. No history of abdominal surgeries for Crohn's.    She denies personal history of colon polyps or colon cancer.  She has a sister had a precancerous polyp requiring surgery at age 43.  Her mother had colon cancer at age 48.      Past Medical History:   Diagnosis Date   • Crohn's colitis (CMS/HCC)    • Esophagitis determined by endoscopy        Past Surgical History:   Procedure Laterality Date   • APPENDECTOMY     • CHOLECYSTECTOMY     • COLONOSCOPY     •  ENDOSCOPY  03/2018   • HAND TENDON SURGERY     • HYSTERECTOMY     • OOPHORECTOMY     • TONSILLECTOMY         Outpatient Medications Marked as Taking for the 4/11/19 encounter (Office Visit) with Crystal Sheldon APRN   Medication Sig Dispense Refill   • ALPRAZolam (XANAX) 0.5 MG tablet Take 0.25 mg by mouth At Night As Needed for Anxiety.     • HYDROcodone-acetaminophen (NORCO) 7.5-325 MG per tablet Take 1 tablet by mouth Every 6 (Six) Hours As Needed for Moderate Pain .     • nitrofurantoin, macrocrystal-monohydrate, (MACROBID) 100 MG capsule Take 1 capsule by mouth Daily.     • PENTASA 500 MG CR capsule 2,000 mg 2 (Two) Times a Day.     • [DISCONTINUED] dexlansoprazole (DEXILANT) 60 MG capsule Take 60 mg by mouth Daily.         No Known Allergies    Social History     Socioeconomic History   • Marital status:      Spouse name: Not on file   • Number of children: Not on file   • Years of education: Not on file   • Highest education level: Not on file   Tobacco Use   • Smoking status: Current Every Day Smoker     Packs/day: 1.00   • Smokeless tobacco: Never Used   Substance and Sexual Activity   • Alcohol use: No   • Drug use: No   • Sexual activity: Yes     Birth control/protection: Surgical       Family History   Problem Relation Age of Onset   • Hypertension Father    • Heart disease Paternal Grandmother    • Colon cancer Mother         at age 48   • Colon polyps Sister         precancerous polyp requring surgery at age 43.    • Breast cancer Neg Hx    • Ovarian cancer Neg Hx        Review of Systems   Constitutional: Negative for appetite change, chills, fatigue, fever and unexpected weight change.   HENT: Negative for sore throat and trouble swallowing.    Eyes: Negative for visual disturbance.   Respiratory: Negative for cough, chest tightness, shortness of breath and wheezing.    Cardiovascular: Negative for chest pain and palpitations.   Gastrointestinal: Negative for abdominal distention, abdominal  "pain, anal bleeding, blood in stool, constipation, diarrhea, nausea and vomiting.        As mentioned in hpi   Genitourinary: Negative for difficulty urinating and hematuria.   Musculoskeletal: Negative for arthralgias and back pain.   Skin: Negative for color change and rash.   Neurological: Negative for dizziness, seizures, syncope, light-headedness and headaches.   Hematological: Negative for adenopathy.   Psychiatric/Behavioral: Negative for confusion. The patient is not nervous/anxious.         Vitals:    04/11/19 1404   BP: 120/80   BP Location: Left arm   Patient Position: Sitting   Cuff Size: Adult   Pulse: 67   Temp: 97.9 °F (36.6 °C)   TempSrc: Tympanic   SpO2: 98%   Weight: 67.9 kg (149 lb 12.8 oz)   Height: 167.6 cm (66\")      Body mass index is 24.18 kg/m².    Physical Exam   Constitutional: She appears well-developed and well-nourished. No distress.   HENT:   Head: Normocephalic and atraumatic.   Eyes: EOM are normal. No scleral icterus.   Neck: Neck supple. No JVD present.   Cardiovascular: Normal rate, regular rhythm and normal heart sounds.   Pulmonary/Chest: Effort normal and breath sounds normal.   Abdominal: Soft. Bowel sounds are normal. She exhibits no distension. There is no tenderness.   Musculoskeletal: Normal range of motion. She exhibits no deformity.   Neurological: She is alert.   Skin: Skin is warm and dry. No rash noted.   Psychiatric: She has a normal mood and affect. Her behavior is normal.   Vitals reviewed.              ASSESSMENT AND PLAN    Assessment/Plan     Maria Victoria was seen today for heartburn.    Diagnoses and all orders for this visit:    Gastroesophageal reflux disease, esophagitis presence not specified  -     Case Request; Standing  -     Case Request    Crohn's disease with complication, unspecified gastrointestinal tract location (CMS/HCC)    Family hx of colon cancer    Family hx colonic polyps    Tobacco use    Other orders  -     Follow Anesthesia Guidelines / Standing " Orders; Future  -     Implement Anesthesia Orders Day of Procedure; Standing  -     Obtain Informed Consent; Standing  -     pantoprazole (PROTONIX) 40 MG EC tablet; Take 1 tablet by mouth Daily.            ESOPHAGOGASTRODUODENOSCOPY WITH ANESTHESIA (N/A)   I discussed non pharmaceutical treatment of gerd.  This includes gradually losing weight to achieve ideal body wt., elevation of the head of bed by 4-6 inches, nothing to eat or drink 3 hours prior to lying down, avoiding tight clothing, stress reduction, tobacco cessation, reduction of alcohol intake, and dietary restrictions (avoiding caffeine, coffee, fatty foods, mints, chocolate, spicy foods and tomato based sauces as much as possible).        In regards to GERD, recommend strict anti reflux precautions. Recommend repeat egd.   I discussed non pharmaceutical treatment of gerd.  This includes gradually losing weight to achieve ideal body wt., elevation of the head of bed by 4-6 inches, nothing to eat or drink 3 hours prior to lying down, avoiding tight clothing, stress reduction, tobacco cessation, reduction of alcohol intake, and dietary restrictions (avoiding caffeine, coffee, fatty foods, mints, chocolate, spicy foods and tomato based sauces as much as possible).      In regards to crohn's disease, will need records from previous GI MD, Dr. Parada.  Continue pentasa daily.   F/u 1 year.         I advised Maria Victoria of the risks of continuing to use tobacco, and I provided her with tobacco cessation educational materials in the After Visit Summary.     During this visit, I spent > 3  minutes counseling the patient regarding tobacco cessation.    Body mass index is 24.18 kg/m².    Patient's Body mass index is 24.18 kg/m². BMI is within normal parameters. No follow-up required..       Return in about 1 year (around 4/11/2020).          SARBJIT March    EMR Dragon/transcription disclaimer:  Much of this encounter note is electronic transcription/translation  of spoken language to printed text.  The electronic translation of spoken language may be erroneous, or at times, nonsensical words or phrases may be inadvertently transcribed.  Although I have reviewed the note for such errors, some may still exist.      Received records from Dr. Parada office. Last colonoscopy 7/2016 ileitis. TI biopsy path chronic ileitis ( mild), no adenomatous or dysplastic changes.   Notes states that she had right colon ulcers in 2007.   Last egd 2/2018 GEJ biopsy with chronic gastritis with focal intestinal metaplasia, no dysplasia.

## 2019-04-22 ENCOUNTER — PREP FOR SURGERY (OUTPATIENT)
Dept: OTHER | Facility: HOSPITAL | Age: 54
End: 2019-04-22

## 2019-05-01 ENCOUNTER — HOSPITAL ENCOUNTER (OUTPATIENT)
Facility: HOSPITAL | Age: 54
Setting detail: HOSPITAL OUTPATIENT SURGERY
Discharge: HOME | End: 2019-05-01
Attending: INTERNAL MEDICINE | Admitting: INTERNAL MEDICINE

## 2019-05-01 ENCOUNTER — ANESTHESIA (OUTPATIENT)
Dept: GASTROENTEROLOGY | Facility: HOSPITAL | Age: 54
End: 2019-05-01

## 2019-05-01 ENCOUNTER — ANESTHESIA EVENT (OUTPATIENT)
Dept: GASTROENTEROLOGY | Facility: HOSPITAL | Age: 54
End: 2019-05-01

## 2019-05-01 VITALS
WEIGHT: 148 LBS | BODY MASS INDEX: 23.78 KG/M2 | TEMPERATURE: 97.8 F | RESPIRATION RATE: 18 BRPM | OXYGEN SATURATION: 100 % | HEIGHT: 66 IN | HEART RATE: 80 BPM | DIASTOLIC BLOOD PRESSURE: 67 MMHG | SYSTOLIC BLOOD PRESSURE: 114 MMHG

## 2019-05-01 DIAGNOSIS — K21.9 GASTROESOPHAGEAL REFLUX DISEASE, ESOPHAGITIS PRESENCE NOT SPECIFIED: ICD-10-CM

## 2019-05-01 PROCEDURE — 88305 TISSUE EXAM BY PATHOLOGIST: CPT | Performed by: INTERNAL MEDICINE

## 2019-05-01 PROCEDURE — 45380 COLONOSCOPY AND BIOPSY: CPT | Performed by: INTERNAL MEDICINE

## 2019-05-01 PROCEDURE — 43239 EGD BIOPSY SINGLE/MULTIPLE: CPT | Performed by: INTERNAL MEDICINE

## 2019-05-01 PROCEDURE — 25010000002 PROPOFOL 10 MG/ML EMULSION: Performed by: NURSE ANESTHETIST, CERTIFIED REGISTERED

## 2019-05-01 RX ORDER — ONDANSETRON 2 MG/ML
4 INJECTION INTRAMUSCULAR; INTRAVENOUS ONCE AS NEEDED
Status: DISCONTINUED | OUTPATIENT
Start: 2019-05-01 | End: 2019-05-01 | Stop reason: HOSPADM

## 2019-05-01 RX ORDER — LIDOCAINE HYDROCHLORIDE 20 MG/ML
INJECTION, SOLUTION INFILTRATION; PERINEURAL AS NEEDED
Status: DISCONTINUED | OUTPATIENT
Start: 2019-05-01 | End: 2019-05-01 | Stop reason: SURG

## 2019-05-01 RX ORDER — PROPOFOL 10 MG/ML
VIAL (ML) INTRAVENOUS AS NEEDED
Status: DISCONTINUED | OUTPATIENT
Start: 2019-05-01 | End: 2019-05-01 | Stop reason: SURG

## 2019-05-01 RX ORDER — SODIUM CHLORIDE 9 MG/ML
500 INJECTION, SOLUTION INTRAVENOUS CONTINUOUS PRN
Status: DISCONTINUED | OUTPATIENT
Start: 2019-05-01 | End: 2019-05-01 | Stop reason: HOSPADM

## 2019-05-01 RX ORDER — SODIUM CHLORIDE 0.9 % (FLUSH) 0.9 %
3 SYRINGE (ML) INJECTION AS NEEDED
Status: DISCONTINUED | OUTPATIENT
Start: 2019-05-01 | End: 2019-05-01 | Stop reason: HOSPADM

## 2019-05-01 RX ADMIN — LIDOCAINE HYDROCHLORIDE 50 MG: 20 INJECTION, SOLUTION INFILTRATION; PERINEURAL at 08:10

## 2019-05-01 RX ADMIN — PROPOFOL 550 MG: 10 INJECTION, EMULSION INTRAVENOUS at 08:17

## 2019-05-01 RX ADMIN — SODIUM CHLORIDE 500 ML: 9 INJECTION, SOLUTION INTRAVENOUS at 07:53

## 2019-05-01 NOTE — INTERVAL H&P NOTE
H&P updated. The patient was examined and the following changes are noted:  She is on Protonix now she states it is working much better for her reflux.

## 2019-05-01 NOTE — ANESTHESIA PREPROCEDURE EVALUATION
Anesthesia Evaluation     Patient summary reviewed   no history of anesthetic complications:  NPO Solid Status: > 8 hours  NPO Liquid Status: > 2 hours           Airway   Mallampati: II  TM distance: >3 FB  Neck ROM: full  No difficulty expected  Dental - normal exam     Pulmonary    (+) a smoker Current Smoked day of surgery,   Cardiovascular - negative cardio ROS  Exercise tolerance: good (4-7 METS)        Neuro/Psych  (-) seizures, TIA, CVA  GI/Hepatic/Renal/Endo    (+)  GERD,      Musculoskeletal     Abdominal    Substance History      OB/GYN          Other                        Anesthesia Plan    ASA 2     MAC     intravenous induction   Anesthetic plan, all risks, benefits, and alternatives have been provided, discussed and informed consent has been obtained with: patient.

## 2019-05-01 NOTE — ANESTHESIA POSTPROCEDURE EVALUATION
Patient: Maria Victoria Aranda    Procedure Summary     Date:  05/01/19 Room / Location:  [facility] ENDOSCOPY 4 / [facility] ENDOSCOPY    Anesthesia Start:  0810 Anesthesia Stop:  0844    Procedures:       ESOPHAGOGASTRODUODENOSCOPY WITH ANESTHESIA (N/A )      COLONOSCOPY WITH ANESTHESIA (N/A ) Diagnosis:       Gastroesophageal reflux disease, esophagitis presence not specified      (Gastroesophageal reflux disease, esophagitis presence not specified [K21.9])    Surgeon:  Sreedhar Ball MD Provider:  Per Love CRNA    Anesthesia Type:  MAC ASA Status:  2          Anesthesia Type: MAC  Last vitals  BP   123/87 (05/01/19 0737)   Temp   97.8 °F (36.6 °C) (05/01/19 0737)   Pulse   89 (05/01/19 0737)   Resp   20 (05/01/19 0737)     SpO2   99 % (05/01/19 0737)     Post Anesthesia Care and Evaluation    Patient location during evaluation: PACU  Patient participation: complete - patient participated  Level of consciousness: awake and awake and alert  Pain score: 0  Pain management: adequate  Airway patency: patent  Anesthetic complications: No anesthetic complications    Cardiovascular status: acceptable and stable  Respiratory status: acceptable and unassisted  Hydration status: acceptable

## 2019-05-02 LAB
CYTO UR: NORMAL
LAB AP CASE REPORT: NORMAL
LAB AP CLINICAL INFORMATION: NORMAL
PATH REPORT.FINAL DX SPEC: NORMAL
PATH REPORT.GROSS SPEC: NORMAL

## 2019-05-30 ENCOUNTER — LAB (OUTPATIENT)
Dept: LAB | Facility: HOSPITAL | Age: 54
End: 2019-05-30

## 2019-05-30 ENCOUNTER — OFFICE VISIT (OUTPATIENT)
Dept: INTERNAL MEDICINE | Facility: CLINIC | Age: 54
End: 2019-05-30

## 2019-05-30 VITALS
BODY MASS INDEX: 23.78 KG/M2 | DIASTOLIC BLOOD PRESSURE: 80 MMHG | RESPIRATION RATE: 16 BRPM | HEART RATE: 93 BPM | WEIGHT: 148 LBS | OXYGEN SATURATION: 98 % | HEIGHT: 66 IN | SYSTOLIC BLOOD PRESSURE: 100 MMHG

## 2019-05-30 DIAGNOSIS — Z72.0 TOBACCO USE: ICD-10-CM

## 2019-05-30 DIAGNOSIS — H93.11 TINNITUS OF RIGHT EAR: ICD-10-CM

## 2019-05-30 DIAGNOSIS — Z00.00 HEALTHCARE MAINTENANCE: ICD-10-CM

## 2019-05-30 DIAGNOSIS — R42 VERTIGO: ICD-10-CM

## 2019-05-30 DIAGNOSIS — R42 VERTIGO: Primary | ICD-10-CM

## 2019-05-30 DIAGNOSIS — K21.9 GASTROESOPHAGEAL REFLUX DISEASE WITHOUT ESOPHAGITIS: ICD-10-CM

## 2019-05-30 LAB
25(OH)D3 SERPL-MCNC: 30.8 NG/ML (ref 30–100)
ALBUMIN SERPL-MCNC: 4.2 G/DL (ref 3.5–5)
ALBUMIN/GLOB SERPL: 1.5 G/DL (ref 1.1–2.5)
ALP SERPL-CCNC: 123 U/L (ref 24–120)
ALT SERPL W P-5'-P-CCNC: 16 U/L (ref 0–54)
ANION GAP SERPL CALCULATED.3IONS-SCNC: 8 MMOL/L (ref 4–13)
ARTICHOKE IGE QN: 124 MG/DL (ref 0–99)
AST SERPL-CCNC: 14 U/L (ref 7–45)
BASOPHILS # BLD AUTO: 0.03 10*3/MM3 (ref 0–0.2)
BASOPHILS NFR BLD AUTO: 0.4 % (ref 0–2)
BILIRUB SERPL-MCNC: 0.4 MG/DL (ref 0.1–1)
BUN BLD-MCNC: 13 MG/DL (ref 5–21)
BUN/CREAT SERPL: 17.3 (ref 7–25)
CALCIUM SPEC-SCNC: 9.1 MG/DL (ref 8.4–10.4)
CHLORIDE SERPL-SCNC: 107 MMOL/L (ref 98–110)
CHOLEST SERPL-MCNC: 198 MG/DL (ref 130–200)
CO2 SERPL-SCNC: 27 MMOL/L (ref 24–31)
CREAT BLD-MCNC: 0.75 MG/DL (ref 0.5–1.4)
DEPRECATED RDW RBC AUTO: 45.1 FL (ref 40–54)
EOSINOPHIL # BLD AUTO: 0.15 10*3/MM3 (ref 0–0.7)
EOSINOPHIL NFR BLD AUTO: 1.9 % (ref 0–4)
ERYTHROCYTE [DISTWIDTH] IN BLOOD BY AUTOMATED COUNT: 14.3 % (ref 12–15)
FOLATE SERPL-MCNC: 4.49 NG/ML (ref 4.78–24.2)
GFR SERPL CREATININE-BSD FRML MDRD: 81 ML/MIN/1.73
GLOBULIN UR ELPH-MCNC: 2.8 GM/DL
GLUCOSE BLD-MCNC: 94 MG/DL (ref 70–100)
HCT VFR BLD AUTO: 40.9 % (ref 37–47)
HDLC SERPL-MCNC: 36 MG/DL
HGB BLD-MCNC: 13.6 G/DL (ref 12–16)
IMM GRANULOCYTES # BLD AUTO: 0.03 10*3/MM3 (ref 0–0.05)
IMM GRANULOCYTES NFR BLD AUTO: 0.4 % (ref 0–5)
LDLC/HDLC SERPL: 3.8 {RATIO}
LYMPHOCYTES # BLD AUTO: 1.73 10*3/MM3 (ref 0.72–4.86)
LYMPHOCYTES NFR BLD AUTO: 21.4 % (ref 15–45)
MCH RBC QN AUTO: 28.6 PG (ref 28–32)
MCHC RBC AUTO-ENTMCNC: 33.3 G/DL (ref 33–36)
MCV RBC AUTO: 86.1 FL (ref 82–98)
MONOCYTES # BLD AUTO: 0.49 10*3/MM3 (ref 0.19–1.3)
MONOCYTES NFR BLD AUTO: 6.1 % (ref 4–12)
NEUTROPHILS # BLD AUTO: 5.66 10*3/MM3 (ref 1.87–8.4)
NEUTROPHILS NFR BLD AUTO: 69.8 % (ref 39–78)
NRBC BLD AUTO-RTO: 0 /100 WBC (ref 0–0.2)
PLATELET # BLD AUTO: 207 10*3/MM3 (ref 130–400)
PMV BLD AUTO: 12.1 FL (ref 6–12)
POTASSIUM BLD-SCNC: 4.3 MMOL/L (ref 3.5–5.3)
PROT SERPL-MCNC: 7 G/DL (ref 6.3–8.7)
RBC # BLD AUTO: 4.75 10*6/MM3 (ref 4.2–5.4)
SODIUM BLD-SCNC: 142 MMOL/L (ref 135–145)
TRIGL SERPL-MCNC: 126 MG/DL (ref 0–149)
TSH SERPL DL<=0.05 MIU/L-ACNC: 1.75 MIU/ML (ref 0.47–4.68)
VIT B12 BLD-MCNC: 220 PG/ML (ref 239–931)
WBC NRBC COR # BLD: 8.09 10*3/MM3 (ref 4.8–10.8)

## 2019-05-30 PROCEDURE — 82306 VITAMIN D 25 HYDROXY: CPT | Performed by: INTERNAL MEDICINE

## 2019-05-30 PROCEDURE — 82607 VITAMIN B-12: CPT | Performed by: INTERNAL MEDICINE

## 2019-05-30 PROCEDURE — 80053 COMPREHEN METABOLIC PANEL: CPT | Performed by: INTERNAL MEDICINE

## 2019-05-30 PROCEDURE — 99204 OFFICE O/P NEW MOD 45 MIN: CPT | Performed by: INTERNAL MEDICINE

## 2019-05-30 PROCEDURE — 80061 LIPID PANEL: CPT | Performed by: INTERNAL MEDICINE

## 2019-05-30 PROCEDURE — 36415 COLL VENOUS BLD VENIPUNCTURE: CPT

## 2019-05-30 PROCEDURE — 99406 BEHAV CHNG SMOKING 3-10 MIN: CPT | Performed by: INTERNAL MEDICINE

## 2019-05-30 PROCEDURE — 85025 COMPLETE CBC W/AUTO DIFF WBC: CPT | Performed by: INTERNAL MEDICINE

## 2019-05-30 PROCEDURE — 82746 ASSAY OF FOLIC ACID SERUM: CPT | Performed by: INTERNAL MEDICINE

## 2019-05-30 PROCEDURE — 84443 ASSAY THYROID STIM HORMONE: CPT | Performed by: INTERNAL MEDICINE

## 2019-05-30 RX ORDER — MECLIZINE HYDROCHLORIDE 25 MG/1
25 TABLET ORAL 3 TIMES DAILY PRN
Status: DISCONTINUED | OUTPATIENT
Start: 2019-05-30 | End: 2020-06-11

## 2019-05-30 NOTE — PROGRESS NOTES
Chief Complaint   Patient presents with   • Establish Care     Pt c/o dizziness since she had shingles on her neck in December.  Pt c/o ringing in her right ear. Pt c/o upper back and left shoulder pain, she follows Dr. Alonzo for this.         History:  Maria Victoria Aranda is a 54 y.o. female who presents today for evaluation of the above problems.      EGD and colonoscopy by Dr. Ball on May first 2019 for esophageal reflux and Crohn's disease with ileitis.  Sections were biopsied and reviewed.  GE junction biopsy showed moderate chronic esophageal gastritis but no intestinal metaplasia.  Terminal ileum, cecum and rectum biopsies did not show any active ileitis or colitis or other changes.    Subjective   Maria Victoria Aranda is a 54 y.o. female who presents for evaluation of dizziness. The symptoms started 6 month ago and are unchanged. The attacks occur several times per week and last 30 seconds. Positions that worsen symptoms: lying down, rolling in bed to the left, rolling in bed to the right and turning head. Previous workup/treatments: none. Associated ear symptoms: tinnitus  hearing loss. Associated CNS symptoms: none. Recent infections: shingles December 2018. Head trauma: denied. Drug ingestion: none. Noise exposure: no occupational exposure. Family history: positive for hearing loss in right ear.       HPI    ROS:  Review of Systems   Constitutional: Positive for fatigue. Negative for activity change, appetite change, fever and unexpected weight change.   HENT: Positive for hearing loss, mouth sores (Angular cheleitis) and tinnitus. Negative for nosebleeds, sore throat and trouble swallowing.    Eyes: Negative for pain and visual disturbance.   Respiratory: Negative for cough, chest tightness and shortness of breath.    Cardiovascular: Negative for chest pain, palpitations and leg swelling.   Gastrointestinal: Negative for abdominal pain, constipation, diarrhea, nausea and vomiting.   Endocrine:        Negative for  Diabetes or thyroid disease   Genitourinary: Negative for hematuria.   Musculoskeletal: Positive for arthralgias (left shoulder) and back pain. Negative for gait problem, neck pain and neck stiffness.   Skin: Negative for rash and wound.   Neurological: Positive for dizziness and headaches (In january headache for 2 weeks, now resolved). Negative for tremors, syncope, weakness, light-headedness and numbness.   Hematological: Negative for adenopathy. Does not bruise/bleed easily.   Psychiatric/Behavioral: Negative for agitation, behavioral problems and confusion.       Allergies   Allergen Reactions   • Wasp Venom Itching     Past Medical History:   Diagnosis Date   • Anxiety    • Crohn's colitis (CMS/HCC)    • Depression    • Esophagitis determined by endoscopy    • PONV (postoperative nausea and vomiting)      Past Surgical History:   Procedure Laterality Date   • APPENDECTOMY     • CHOLECYSTECTOMY     • COLONOSCOPY     • COLONOSCOPY N/A 5/1/2019    Procedure: COLONOSCOPY WITH ANESTHESIA;  Surgeon: Sreedhar Ball MD;  Location: [facility] ENDOSCOPY;  Service: Gastroenterology   • ENDOSCOPY  03/2018   • ENDOSCOPY N/A 5/1/2019    Procedure: ESOPHAGOGASTRODUODENOSCOPY WITH ANESTHESIA;  Surgeon: Sreedhar Ball MD;  Location: [facility] ENDOSCOPY;  Service: Gastroenterology   • HAND TENDON SURGERY     • HYSTERECTOMY     • OOPHORECTOMY     • TONSILLECTOMY       Family History   Problem Relation Age of Onset   • Hypertension Father    • Heart disease Paternal Grandmother    • Colon cancer Mother         at age 48   • Colon polyps Sister         precancerous polyp requring surgery at age 43.    • Breast cancer Neg Hx    • Ovarian cancer Neg Hx      Maria Victoria  reports that she has been smoking.  She has been smoking about 1.00 pack per day. She has never used smokeless tobacco. She reports that she does not drink alcohol or use drugs.    I have reviewed and updated the above documentation (if necessary) including but not limited  "to chief complaint, ROS, PFSH, allergies and medications        Current Outpatient Medications:   •  HYDROcodone-acetaminophen (NORCO) 7.5-325 MG per tablet, Take 1 tablet by mouth Every 6 (Six) Hours As Needed for Moderate Pain ., Disp: , Rfl:   •  pantoprazole (PROTONIX) 40 MG EC tablet, Take 1 tablet by mouth Daily., Disp: 30 tablet, Rfl: 11  •  PENTASA 500 MG CR capsule, 2,000 mg 2 (Two) Times a Day., Disp: , Rfl:   •  ALPRAZolam (XANAX) 0.5 MG tablet, Take 0.25 mg by mouth At Night As Needed for Anxiety., Disp: , Rfl:     Current Facility-Administered Medications:   •  meclizine (ANTIVERT) tablet 25 mg, 25 mg, Oral, TID PRN, Dinesh Hugo MD    OBJECTIVE:  Visit Vitals  /80 (BP Location: Left arm, Patient Position: Sitting, Cuff Size: Adult)   Pulse 93   Resp 16   Ht 167.6 cm (66\")   Wt 67.1 kg (148 lb)   SpO2 98%   Breastfeeding? No   BMI 23.89 kg/m²      Physical Exam   Constitutional: She is oriented to person, place, and time. She appears well-developed and well-nourished. No distress.   HENT:   Head: Normocephalic and atraumatic.   Right Ear: Hearing, tympanic membrane, external ear and ear canal normal.   Left Ear: Hearing, tympanic membrane, external ear and ear canal normal.   Mouth/Throat: Oropharynx is clear and moist. No oropharyngeal exudate.   Eyes: Conjunctivae and EOM are normal. Pupils are equal, round, and reactive to light. No scleral icterus.   Neck: Normal range of motion. Neck supple. No JVD present. No thyromegaly present.   Cardiovascular: Normal rate, regular rhythm and normal heart sounds.   No murmur heard.  Pulmonary/Chest: Effort normal and breath sounds normal. She has no wheezes. She has no rales.   Abdominal: Soft. Bowel sounds are normal. She exhibits no distension. There is no tenderness. There is no rebound and no guarding.   Musculoskeletal: She exhibits no edema or tenderness.   Lymphadenopathy:     She has no cervical adenopathy.   Neurological: She is alert " and oriented to person, place, and time. She has normal strength. She displays no tremor. No cranial nerve deficit or sensory deficit. She exhibits normal muscle tone. Coordination normal. GCS eye subscore is 4. GCS verbal subscore is 5. GCS motor subscore is 6.   No nystagmus   Skin: Skin is warm and dry. She is not diaphoretic.   Psychiatric: She has a normal mood and affect. Her behavior is normal.   Vitals reviewed.      Assessment/Plan    Maria Victoria was seen today for establish care.    Diagnoses and all orders for this visit:    Vertigo  -     CBC w AUTO Differential; Future  -     Comprehensive metabolic panel; Future  -     TSH; Future  -     Vitamin B12; Future  -     Vitamin D 25 hydroxy; Future  -     Folate; Future  -     MRI Brain With & Without Contrast; Future    Tobacco use    Gastroesophageal reflux disease without esophagitis    Tinnitus of right ear  -     TSH; Future  -     MRI Brain With & Without Contrast; Future    Healthcare maintenance  -     Vitamin D 25 hydroxy; Future  -     Lipid panel; Future    Other orders  -     meclizine (ANTIVERT) tablet 25 mg        Vertigo:    Meclizine per medication orders.  We will check general labs with CBC, CMP, TSH  MRI head ordered given the tinnitus, change in hearing and headache.  The nature of vertigo syndromes were discussed along with their usual course and treatment.  Educational materials given and questions answered.    Reflux is well controlled with Protonix.  EGD and colonoscopy on May 1, 2019.  Results reviewed.  Follows Dr. Ball.    General health maintenance: Labs per above    I advised the patient and the risks of continuing to use tobacco, and I provided this patient with smoking cessation education materials.  During this visit, I spent greater than 3 to 10 minutes counseling the patient regarding smoking cessation.       An After Visit Summary was printed and given to the patient at discharge.  Return in about 6 months (around 11/30/2019).          MOHAMUD Hugo MD   8:42 AM  5/30/2019

## 2019-05-31 RX ORDER — ATORVASTATIN CALCIUM 20 MG/1
20 TABLET, FILM COATED ORAL DAILY
Qty: 30 TABLET | Refills: 5 | Status: SHIPPED | OUTPATIENT
Start: 2019-05-31 | End: 2019-06-12 | Stop reason: SINTOL

## 2019-05-31 RX ORDER — UREA 10 %
800 LOTION (ML) TOPICAL DAILY
Qty: 30 TABLET | Refills: 5 | Status: SHIPPED | OUTPATIENT
Start: 2019-05-31 | End: 2020-06-11

## 2019-05-31 RX ORDER — LANOLIN ALCOHOL/MO/W.PET/CERES
1000 CREAM (GRAM) TOPICAL DAILY
Qty: 30 TABLET | Refills: 5 | Status: SHIPPED | OUTPATIENT
Start: 2019-05-31 | End: 2020-06-11

## 2019-06-05 ENCOUNTER — HOSPITAL ENCOUNTER (OUTPATIENT)
Dept: MRI IMAGING | Facility: HOSPITAL | Age: 54
Discharge: HOME | End: 2019-06-05
Admitting: INTERNAL MEDICINE

## 2019-06-05 DIAGNOSIS — R42 VERTIGO: ICD-10-CM

## 2019-06-05 DIAGNOSIS — H93.11 TINNITUS OF RIGHT EAR: ICD-10-CM

## 2019-06-05 PROCEDURE — A9577 INJ MULTIHANCE: HCPCS | Performed by: INTERNAL MEDICINE

## 2019-06-05 PROCEDURE — 0 GADOBENATE DIMEGLUMINE 529 MG/ML SOLUTION: Performed by: INTERNAL MEDICINE

## 2019-06-05 PROCEDURE — 70553 MRI BRAIN STEM W/O & W/DYE: CPT

## 2019-06-05 RX ADMIN — GADOBENATE DIMEGLUMINE 13 ML: 529 INJECTION, SOLUTION INTRAVENOUS at 14:05

## 2019-06-07 ENCOUNTER — TELEPHONE (OUTPATIENT)
Dept: INTERNAL MEDICINE | Facility: CLINIC | Age: 54
End: 2019-06-07

## 2019-06-07 DIAGNOSIS — R90.82 WHITE MATTER ABNORMALITY ON MRI OF BRAIN: ICD-10-CM

## 2019-06-07 DIAGNOSIS — R42 VERTIGO: Primary | ICD-10-CM

## 2019-06-07 NOTE — TELEPHONE ENCOUNTER
Contacted pt about appointment at [facility] [address], Dr dolan. Her appointment is July 16 arrive 10:00 for 10:30 appointment. Faxed all records to [phone] pt instructed to take a copy of her MRI and CT scans on a disc. She voiced understanding.

## 2019-06-10 RX ORDER — MESALAMINE 500 MG/1
1000 CAPSULE ORAL 2 TIMES DAILY
Qty: 120 CAPSULE | Refills: 10 | Status: SHIPPED | OUTPATIENT
Start: 2019-06-10 | End: 2020-05-19 | Stop reason: SDUPTHER

## 2019-06-12 ENCOUNTER — TELEPHONE (OUTPATIENT)
Dept: INTERNAL MEDICINE | Facility: CLINIC | Age: 54
End: 2019-06-12

## 2019-06-12 RX ORDER — EZETIMIBE 10 MG/1
10 TABLET ORAL DAILY
Qty: 30 TABLET | Refills: 5 | Status: SHIPPED | OUTPATIENT
Start: 2019-06-12 | End: 2019-12-13 | Stop reason: SDUPTHER

## 2019-06-12 NOTE — TELEPHONE ENCOUNTER
She isn't having diarrhea from the B12 but if she isn't tolerating it then she can continue taking it.

## 2019-06-12 NOTE — TELEPHONE ENCOUNTER
Pt says that B-12 is giving her Diarrhea and Lipitor is causing her to feel really tired along with muscle cramps. She says that she has tried Zetia in the past with no problems and she would like to try this again.

## 2019-06-12 NOTE — TELEPHONE ENCOUNTER
Its it ok for her to discontinue taking b12 due to it causing her upset stomach, diarrhea, should she take something else?

## 2019-06-20 ENCOUNTER — TELEPHONE (OUTPATIENT)
Dept: INTERNAL MEDICINE | Facility: CLINIC | Age: 54
End: 2019-06-20

## 2019-06-20 NOTE — TELEPHONE ENCOUNTER
Pt called stating that her Zetia 10 mg tablets need Prior Authorization, she is unable to pick this up from pharmacy

## 2019-07-18 ENCOUNTER — OFFICE VISIT (OUTPATIENT)
Dept: NEUROLOGY | Age: 54
End: 2019-07-18
Payer: COMMERCIAL

## 2019-07-18 VITALS
SYSTOLIC BLOOD PRESSURE: 105 MMHG | WEIGHT: 156 LBS | HEART RATE: 84 BPM | DIASTOLIC BLOOD PRESSURE: 71 MMHG | HEIGHT: 66 IN | BODY MASS INDEX: 25.07 KG/M2

## 2019-07-18 DIAGNOSIS — R51.9 HEADACHE DISORDER: Primary | ICD-10-CM

## 2019-07-18 DIAGNOSIS — M54.2 PAIN, NECK: ICD-10-CM

## 2019-07-18 PROCEDURE — 99204 OFFICE O/P NEW MOD 45 MIN: CPT | Performed by: PSYCHIATRY & NEUROLOGY

## 2019-07-18 RX ORDER — MECLIZINE HYDROCHLORIDE 25 MG/1
25 TABLET ORAL 3 TIMES DAILY PRN
COMMUNITY

## 2019-07-18 RX ORDER — EZETIMIBE 10 MG/1
10 TABLET ORAL
COMMUNITY
Start: 2019-06-12

## 2019-07-18 RX ORDER — PANTOPRAZOLE SODIUM 40 MG/1
40 TABLET, DELAYED RELEASE ORAL DAILY
COMMUNITY

## 2019-07-18 RX ORDER — TOPIRAMATE 25 MG/1
TABLET ORAL
Qty: 120 TABLET | Refills: 5 | Status: SHIPPED | OUTPATIENT
Start: 2019-07-18

## 2019-07-18 NOTE — PROGRESS NOTES
Chief Complaint   Patient presents with    New Patient     headache       Marquise Mckee is a 48y.o. year old female who is seen for evaluation of headache. That patient indicates that she has had headaches since January of 2019. At that time she was on vacation and had a headache for 10 days. She described this like a vice around her head. She went in to a walk in clinic and received a shot and some bupap without benefit. Since then she has had maybe 3-4 headaches a month but they can come and go over a 2-3 day period. She indicates that there is no associated photophobia, phonophobia, or nausea. She does have neck pain that comes and goes. She denies diplopia, dysarthria,confusion or fever. She denies any clear triggers or exacerbating or relieving factors. She denies any significant headaches prior to January of 2019. She has been on a PPI for gastritis since the summer of 2018. Hilda Jeffery Active Ambulatory Problems     Diagnosis Date Noted    Headache disorder 07/18/2019    Pain, neck 07/18/2019     Resolved Ambulatory Problems     Diagnosis Date Noted    No Resolved Ambulatory Problems     Past Medical History:   Diagnosis Date    GERD (gastroesophageal reflux disease)     PONV (postoperative nausea and vomiting)     Ulcerative colitis ([facility] [address]        Past Surgical History:   Procedure Laterality Date    APPENDECTOMY      COLONOSCOPY      ENDOSCOPY, COLON, DIAGNOSTIC      HAND SURGERY Left     HYSTERECTOMY      UT LAP,CHOLECYSTECTOMY/GRAPH N/A 5/31/2018    CHOLECYSTECTOMY LAPAROSCOPIC performed by Monica Mc MD at [address] [place] [address]         History reviewed. No pertinent family history.     No Known Allergies    Social History     Socioeconomic History    Marital status:      Spouse name: Not on file    Number of children: Not on file    Years of education: Not on file    Highest education level: Not on file   Occupational History    Not on file   Social Needs    Financial resource strain: Not on file    Food insecurity:     Worry: Not on file     Inability: Not on file    Transportation needs:     Medical: Not on file     Non-medical: Not on file   Tobacco Use    Smoking status: Current Every Day Smoker     Packs/day: 1.00     Types: Cigarettes    Smokeless tobacco: Never Used   Substance and Sexual Activity    Alcohol use: Yes     Comment: occ    Drug use: Not on file    Sexual activity: Not on file   Lifestyle    Physical activity:     Days per week: Not on file     Minutes per session: Not on file    Stress: Not on file   Relationships    Social connections:     Talks on phone: Not on file     Gets together: Not on file     Attends [employer] service: Not on file     Active member of club or organization: Not on file     Attends meetings of clubs or organizations: Not on file     Relationship status: Not on file    Intimate partner violence:     Fear of current or ex partner: Not on file     Emotionally abused: Not on file     Physically abused: Not on file     Forced sexual activity: Not on file   Other Topics Concern    Not on file   Social History Narrative    Not on file       Review of Systems     Constitutional - No fever or chills. No diaphoresis or significant fatigue. HENT -  No tinnitus or significant hearing loss. Eyes - no sudden vision change or eye pain  Respiratory - no significant shortness of breath or cough  Cardiovascular - no chest pain No palpitations or significant leg swelling  Gastrointestinal - no abdominal swelling or pain. Genitourinary - No difficulty urinating, dysuria  Musculoskeletal - yes back pain or myalgia. Skin - no color change or rash  Neurologic - No seizures. No lateralizing weakness. Hematologic - no easy bruising or excessive bleeding. Psychiatric - no severe anxiety or nervousness.    All other review of systems are negative.         Current Outpatient Medications   Medication Sig Dispense Refill    pantoprazole (PROTONIX) 40

## 2019-09-10 ENCOUNTER — OFFICE VISIT (OUTPATIENT)
Dept: INTERNAL MEDICINE | Facility: CLINIC | Age: 54
End: 2019-09-10

## 2019-09-10 VITALS
WEIGHT: 158 LBS | SYSTOLIC BLOOD PRESSURE: 114 MMHG | BODY MASS INDEX: 25.39 KG/M2 | DIASTOLIC BLOOD PRESSURE: 76 MMHG | OXYGEN SATURATION: 98 % | HEART RATE: 82 BPM | RESPIRATION RATE: 16 BRPM | HEIGHT: 66 IN

## 2019-09-10 DIAGNOSIS — Z72.0 TOBACCO USE: ICD-10-CM

## 2019-09-10 DIAGNOSIS — N64.4 MASTODYNIA OF LEFT BREAST: Primary | ICD-10-CM

## 2019-09-10 DIAGNOSIS — R07.89 ATYPICAL CHEST PAIN: ICD-10-CM

## 2019-09-10 DIAGNOSIS — G89.29 CHRONIC LEFT SHOULDER PAIN: ICD-10-CM

## 2019-09-10 DIAGNOSIS — M25.512 CHRONIC LEFT SHOULDER PAIN: ICD-10-CM

## 2019-09-10 DIAGNOSIS — F41.9 ANXIETY: ICD-10-CM

## 2019-09-10 DIAGNOSIS — Z72.0 TOBACCO ABUSE: ICD-10-CM

## 2019-09-10 PROCEDURE — 93000 ELECTROCARDIOGRAM COMPLETE: CPT | Performed by: INTERNAL MEDICINE

## 2019-09-10 PROCEDURE — 99214 OFFICE O/P EST MOD 30 MIN: CPT | Performed by: INTERNAL MEDICINE

## 2019-09-10 PROCEDURE — 99406 BEHAV CHNG SMOKING 3-10 MIN: CPT | Performed by: INTERNAL MEDICINE

## 2019-09-10 RX ORDER — CYCLOBENZAPRINE HCL 5 MG
5 TABLET ORAL 3 TIMES DAILY PRN
Qty: 15 TABLET | Refills: 0 | Status: SHIPPED | OUTPATIENT
Start: 2019-09-10 | End: 2020-06-11

## 2019-09-10 RX ORDER — BUPROPION HYDROCHLORIDE 150 MG/1
150 TABLET ORAL DAILY
Qty: 30 TABLET | Refills: 2 | Status: SHIPPED | OUTPATIENT
Start: 2019-09-10 | End: 2019-12-11

## 2019-09-10 RX ORDER — TOPIRAMATE 25 MG/1
TABLET ORAL
COMMUNITY
Start: 2019-07-18 | End: 2020-06-11

## 2019-09-10 NOTE — PROGRESS NOTES
Chief Complaint   Patient presents with   • Pain     Pt c/o pain near her left breast for 2 years, she has had mutliple tests that have come back normal. Pt describes the pain as a dull ache.         History:  Maria Victoria Aranda is a 54 y.o. female who presents today for evaluation of the above problems.    Acute visit for left mastodynia. Mammogram March 2019 negative.  Pain is been present for over 2 years.  Reports chiropractor helps most of the time.  She also has left posterior shoulder pain and has been worked up by chiropractor.  She has seen orthopedic [place] as well.  No issues with range of motion or weakness in the left arm and her shoulder.  She does report some chest pain as well over the last 2 years which has been consistent with her left breast pain.  Not worse with inspiration or exertion.  Is worse with palpation.  She has not tried anything other than Norco for the pain which does resolve her pain.  She cannot take NSAIDs due to her gastric issues and Tylenol does not help her.    EKG today reviewed, results below and she has had negative stress test previously.    She does want to quit tobacco use    She has been anxious and Wellbutrin has helped her previously.  She does want to retry it now.    Pain   Associated symptoms include chest pain. Pertinent negatives include no abdominal pain or nausea.       ROS:  Review of Systems   Constitutional: Negative for activity change and appetite change.   Respiratory: Negative for shortness of breath.    Cardiovascular: Positive for chest pain.   Gastrointestinal: Negative for abdominal pain and nausea.   Genitourinary:        Left breast pain chronic   Psychiatric/Behavioral: The patient is nervous/anxious.        Allergies   Allergen Reactions   • Wasp Venom Itching         Current Outpatient Medications:   •  calcium-vitamin D (OSCAL 500/200 D-3) 500-200 MG-UNIT per tablet, Take 1 tablet by mouth Daily., Disp: 30 tablet, Rfl: 11  •  ezetimibe (ZETIA) 10  "MG tablet, Take 1 tablet by mouth Daily., Disp: 30 tablet, Rfl: 5  •  folic acid (FOLVITE) 800 MCG tablet, Take 1 tablet by mouth Daily., Disp: 30 tablet, Rfl: 5  •  pantoprazole (PROTONIX) 40 MG EC tablet, Take 1 tablet by mouth Daily., Disp: 30 tablet, Rfl: 11  •  PENTASA 500 MG CR capsule, Take 2 capsules by mouth 2 (Two) Times a Day., Disp: 120 capsule, Rfl: 10  •  topiramate (TOPAMAX) 25 MG tablet, , Disp: , Rfl:   •  vitamin B-12 (CYANOCOBALAMIN) 1000 MCG tablet, Take 1 tablet by mouth Daily., Disp: 30 tablet, Rfl: 5  •  buPROPion XL (WELLBUTRIN XL) 150 MG 24 hr tablet, Take 1 tablet by mouth Daily., Disp: 30 tablet, Rfl: 2  •  cyclobenzaprine (FLEXERIL) 5 MG tablet, Take 1 tablet by mouth 3 (Three) Times a Day As Needed for Muscle Spasms., Disp: 15 tablet, Rfl: 0    Current Facility-Administered Medications:   •  meclizine (ANTIVERT) tablet 25 mg, 25 mg, Oral, TID PRN, Dinesh Hugo MD    OBJECTIVE:  Visit Vitals  /76 (BP Location: Left arm, Patient Position: Sitting, Cuff Size: Adult)   Pulse 82   Resp 16   Ht 167.6 cm (66\")   Wt 71.7 kg (158 lb)   SpO2 98%   Breastfeeding? No   BMI 25.50 kg/m²        Physical Exam   Constitutional: She appears well-developed and well-nourished. No distress.   HENT:   Head: Normocephalic and atraumatic.   Neck: Normal range of motion. Neck supple.   Cardiovascular: Normal rate and regular rhythm. Exam reveals no friction rub.   No murmur heard.  Pulmonary/Chest: Effort normal and breath sounds normal. No respiratory distress. She has no wheezes. She has no rales.   Musculoskeletal: Normal range of motion. She exhibits tenderness (Just medial to left shoulder blade, point tenderness).   Full strength and range of motion both arms and shoulders as well as hands   Neurological: She is alert.   Skin: Skin is warm and dry. No erythema.   Psychiatric: She has a normal mood and affect. Her behavior is normal.   Vitals reviewed.        ECG 12 Lead  Date/Time: " 9/10/2019 9:58 AM  Performed by: Dinesh Hugo MD  Authorized by: Dinesh Hugo MD   Comparison: not compared with previous ECG   Previous ECG: no previous ECG available  Rate: normal  Comments: Very poor EKG.  Read acute myocardial infarction.  I do not believe this is is the case.  She has no chest pain currently other than reproducible musculoskeletal pain            Assessment/Plan    Maria Victoria was seen today for pain.    Diagnoses and all orders for this visit:    Mastodynia of left breast  -     US Breast Left Complete; Future  -     cyclobenzaprine (FLEXERIL) 5 MG tablet; Take 1 tablet by mouth 3 (Three) Times a Day As Needed for Muscle Spasms.    Chronic left shoulder pain  -     cyclobenzaprine (FLEXERIL) 5 MG tablet; Take 1 tablet by mouth 3 (Three) Times a Day As Needed for Muscle Spasms.    Atypical chest pain  -     ECG 12 Lead    Anxiety  -     buPROPion XL (WELLBUTRIN XL) 150 MG 24 hr tablet; Take 1 tablet by mouth Daily.    Tobacco use  -     buPROPion XL (WELLBUTRIN XL) 150 MG 24 hr tablet; Take 1 tablet by mouth Daily.    Tobacco abuse    Other orders  -     ECG 12 Lead      Her pain is almost certainly musculoskeletal.  She feels like her muscles are are tight and she has always drawn up.  We will try to initiate short course of Flexeril to see if this helps.  Recommended Tylenol no more than 4 g a day.  Breast exam today unremarkable.  Will get ultrasound of left breast for completeness sake.  Mammogram in March 5, 2019 was negative.  Recommended continuing chiropractor and physical therapy for her pain.  She wants to try Wellbutrin again as this will help with her anxiety and she does want to quit tobacco today.  We will follow-up again at her next scheduled visit in December or sooner if needed    I asked the patient about their tobacco use. I advised the patient to quit tobacco and discussed the risks of continuing to use tobacco, and I provided this patient with smoking cessation  education materials. I have assessed the patient's willingness to quit and assisted with their attempt to quit. Prescribed wellbutrin. Quit date not set but will be within next month.  During this visit, I spent greater than 3 to 10 minutes counseling the patient regarding tobacco cessation.   Follow up was arranged per below.    Hasn't taken topamax since no headache since she seen a neurologist.  Mild white matter changes only.    Education materials and an After Visit Summary were printed and given to the patient at discharge.    No Follow-up on file. Keep next judith Hugo MD   8:56 AM  9/10/2019

## 2019-09-20 ENCOUNTER — HOSPITAL ENCOUNTER (OUTPATIENT)
Dept: MAMMOGRAPHY | Facility: HOSPITAL | Age: 54
Discharge: HOME | End: 2019-09-20

## 2019-09-20 ENCOUNTER — HOSPITAL ENCOUNTER (OUTPATIENT)
Dept: ULTRASOUND IMAGING | Facility: HOSPITAL | Age: 54
Discharge: HOME | End: 2019-09-20
Admitting: INTERNAL MEDICINE

## 2019-09-20 DIAGNOSIS — N64.4 MASTODYNIA OF LEFT BREAST: ICD-10-CM

## 2019-09-20 PROCEDURE — G0279 TOMOSYNTHESIS, MAMMO: HCPCS

## 2019-09-20 PROCEDURE — 77065 DX MAMMO INCL CAD UNI: CPT

## 2019-09-20 PROCEDURE — 76641 ULTRASOUND BREAST COMPLETE: CPT

## 2019-11-29 ENCOUNTER — APPOINTMENT (OUTPATIENT)
Dept: MRI IMAGING | Facility: HOSPITAL | Age: 54
End: 2019-11-29

## 2019-12-11 ENCOUNTER — OFFICE VISIT (OUTPATIENT)
Dept: INTERNAL MEDICINE | Facility: CLINIC | Age: 54
End: 2019-12-11

## 2019-12-11 VITALS
RESPIRATION RATE: 16 BRPM | HEART RATE: 88 BPM | WEIGHT: 160 LBS | OXYGEN SATURATION: 98 % | DIASTOLIC BLOOD PRESSURE: 77 MMHG | BODY MASS INDEX: 25.71 KG/M2 | SYSTOLIC BLOOD PRESSURE: 120 MMHG | HEIGHT: 66 IN

## 2019-12-11 DIAGNOSIS — Z72.0 TOBACCO USE: ICD-10-CM

## 2019-12-11 DIAGNOSIS — Z00.00 HEALTHCARE MAINTENANCE: Primary | ICD-10-CM

## 2019-12-11 DIAGNOSIS — Z12.31 BREAST CANCER SCREENING BY MAMMOGRAM: ICD-10-CM

## 2019-12-11 DIAGNOSIS — K50.919 CROHN'S DISEASE WITH COMPLICATION, UNSPECIFIED GASTROINTESTINAL TRACT LOCATION (HCC): ICD-10-CM

## 2019-12-11 PROCEDURE — 99214 OFFICE O/P EST MOD 30 MIN: CPT | Performed by: INTERNAL MEDICINE

## 2019-12-11 NOTE — PROGRESS NOTES
Chief Complaint   Patient presents with   • Follow-up   • Rash     Pt c/o an itchy rash on her back for 1 month         History:  Maria Victoria Aranda is a 54 y.o. female who presents today for evaluation of the above problems.    She is here for a 6-month follow-up.  Overall she is doing well.  Since last visit she has had a mammogram for breast cancer screening and they recommend six-month follow-up.  She has intermittent back rash which is itchy.  She feels like it is a heat rash.  Denies any change in detergents or soaps or otherwise.  Currently she does not have a rash.    She has been unsuccessful quitting smoking and her Wellbutrin caused the headaches stopped.    She also has allergies and has postnasal drainage.  She denies any fever or other cold symptoms.    She saw orthopedic [facility] for left chest and back pain and it feels much better now.     Pain   Associated symptoms include chest pain and a rash. Pertinent negatives include no abdominal pain or nausea.   Rash   Pertinent negatives include no shortness of breath.       ROS:  Review of Systems   Constitutional: Negative for activity change and appetite change.   HENT: Positive for postnasal drip.    Respiratory: Negative for shortness of breath.    Cardiovascular: Positive for chest pain.   Gastrointestinal: Negative for abdominal pain and nausea.   Genitourinary:        Left breast pain chronic   Skin: Positive for rash.   Psychiatric/Behavioral: Negative.  The patient is not nervous/anxious.        Allergies   Allergen Reactions   • Wasp Venom Itching         Current Outpatient Medications:   •  calcium-vitamin D (OSCAL 500/200 D-3) 500-200 MG-UNIT per tablet, Take 1 tablet by mouth Daily., Disp: 30 tablet, Rfl: 11  •  cyclobenzaprine (FLEXERIL) 5 MG tablet, Take 1 tablet by mouth 3 (Three) Times a Day As Needed for Muscle Spasms., Disp: 15 tablet, Rfl: 0  •  ezetimibe (ZETIA) 10 MG tablet, Take 1 tablet by mouth Daily., Disp: 30 tablet, Rfl: 5  •  folic  "acid (FOLVITE) 800 MCG tablet, Take 1 tablet by mouth Daily., Disp: 30 tablet, Rfl: 5  •  pantoprazole (PROTONIX) 40 MG EC tablet, Take 1 tablet by mouth Daily., Disp: 30 tablet, Rfl: 11  •  PENTASA 500 MG CR capsule, Take 2 capsules by mouth 2 (Two) Times a Day., Disp: 120 capsule, Rfl: 10  •  topiramate (TOPAMAX) 25 MG tablet, , Disp: , Rfl:   •  vitamin B-12 (CYANOCOBALAMIN) 1000 MCG tablet, Take 1 tablet by mouth Daily., Disp: 30 tablet, Rfl: 5    Current Facility-Administered Medications:   •  meclizine (ANTIVERT) tablet 25 mg, 25 mg, Oral, TID PRN, Dinesh Hugo MD    OBJECTIVE:  Visit Vitals  /77 (BP Location: Left arm, Patient Position: Sitting, Cuff Size: Adult)   Pulse 88   Resp 16   Ht 167.6 cm (66\")   Wt 72.6 kg (160 lb)   SpO2 98%   Breastfeeding No   BMI 25.82 kg/m²        Physical Exam   Constitutional: She appears well-developed and well-nourished. No distress.   HENT:   Head: Normocephalic and atraumatic.   Neck: Normal range of motion. Neck supple.   Cardiovascular: Normal rate and regular rhythm. Exam reveals no friction rub.   No murmur heard.  Pulmonary/Chest: Effort normal and breath sounds normal. No respiratory distress. She has no wheezes. She has no rales.   Musculoskeletal: Normal range of motion.   Neurological: She is alert.   Skin: Skin is warm and dry. No erythema.   Psychiatric: She has a normal mood and affect. Her behavior is normal.   Vitals reviewed.      Procedures  Assessment/Plan    Maria Victoria was seen today for follow-up and rash.    Diagnoses and all orders for this visit:    Healthcare maintenance  -     CBC w AUTO Differential; Future  -     Comprehensive metabolic panel; Future  -     Lipid panel; Future    Breast cancer screening by mammogram  -     Mammo Screening Digital Tomosynthesis Bilateral With CAD; Future    Tobacco use    Crohn's disease with complication, unspecified gastrointestinal tract location (CMS/HCC)      Check labs in 6 months.  Overall she is " doing well.  She is not ready to quit smoking yet.  Reordered mammogram for 3 months from now.  Crohn's disease is well controlled without any issues.  She has intermittent rash we discussed hydrocortisone cream.  Also discussed Flonase for allergic rhinitis    Follow-up in 6 months with labs a couple days prior    Education materials and an After Visit Summary were printed and given to the patient at discharge.    Return in about 6 months (around 6/11/2020) for Recheck.          MOHAMUD Hugo MD   8:56 AM  12/11/2019

## 2019-12-11 NOTE — PATIENT INSTRUCTIONS
-schedule a mammogram for 3 months from now  -Please get your labs 2 to 3 days prior to your next visit so we can discuss them during that visit.

## 2019-12-12 ENCOUNTER — TELEPHONE (OUTPATIENT)
Dept: INTERNAL MEDICINE | Facility: CLINIC | Age: 54
End: 2019-12-12

## 2019-12-13 RX ORDER — EZETIMIBE 10 MG/1
10 TABLET ORAL DAILY
Qty: 30 TABLET | Refills: 5 | Status: SHIPPED | OUTPATIENT
Start: 2019-12-13

## 2020-02-13 ENCOUNTER — TRANSCRIBE ORDERS (OUTPATIENT)
Dept: ADMINISTRATIVE | Facility: HOSPITAL | Age: 55
End: 2020-02-13

## 2020-02-13 DIAGNOSIS — R22.31 MASS OF RIGHT HAND: Primary | ICD-10-CM

## 2020-02-20 ENCOUNTER — HOSPITAL ENCOUNTER (OUTPATIENT)
Dept: MRI IMAGING | Facility: HOSPITAL | Age: 55
Discharge: HOME | End: 2020-02-20
Admitting: ORTHOPAEDIC SURGERY

## 2020-02-20 DIAGNOSIS — R22.31 MASS OF RIGHT HAND: ICD-10-CM

## 2020-02-20 PROCEDURE — 73218 MRI UPPER EXTREMITY W/O DYE: CPT

## 2020-03-23 ENCOUNTER — APPOINTMENT (OUTPATIENT)
Dept: MAMMOGRAPHY | Facility: HOSPITAL | Age: 55
End: 2020-03-23

## 2020-04-03 RX ORDER — PANTOPRAZOLE SODIUM 40 MG/1
40 TABLET, DELAYED RELEASE ORAL DAILY
Qty: 30 TABLET | Refills: 1 | Status: SHIPPED | OUTPATIENT
Start: 2020-04-03 | End: 2020-05-19 | Stop reason: SDUPTHER

## 2020-04-28 ENCOUNTER — APPOINTMENT (OUTPATIENT)
Dept: MAMMOGRAPHY | Facility: HOSPITAL | Age: 55
End: 2020-04-28

## 2020-05-19 ENCOUNTER — OFFICE VISIT (OUTPATIENT)
Dept: GASTROENTEROLOGY | Facility: CLINIC | Age: 55
End: 2020-05-19

## 2020-05-19 VITALS
SYSTOLIC BLOOD PRESSURE: 122 MMHG | DIASTOLIC BLOOD PRESSURE: 76 MMHG | TEMPERATURE: 97.7 F | HEIGHT: 66 IN | HEART RATE: 90 BPM | BODY MASS INDEX: 25.88 KG/M2 | WEIGHT: 161 LBS | OXYGEN SATURATION: 98 %

## 2020-05-19 DIAGNOSIS — K50.919 CROHN'S DISEASE WITH COMPLICATION, UNSPECIFIED GASTROINTESTINAL TRACT LOCATION (HCC): Primary | ICD-10-CM

## 2020-05-19 DIAGNOSIS — K21.9 GASTROESOPHAGEAL REFLUX DISEASE WITHOUT ESOPHAGITIS: ICD-10-CM

## 2020-05-19 PROCEDURE — 99213 OFFICE O/P EST LOW 20 MIN: CPT | Performed by: INTERNAL MEDICINE

## 2020-05-19 RX ORDER — PANTOPRAZOLE SODIUM 40 MG/1
40 TABLET, DELAYED RELEASE ORAL DAILY
Qty: 30 TABLET | Refills: 11 | Status: SHIPPED | OUTPATIENT
Start: 2020-05-19 | End: 2021-05-06 | Stop reason: SDUPTHER

## 2020-05-19 RX ORDER — MESALAMINE 500 MG/1
1000 CAPSULE ORAL 2 TIMES DAILY
Qty: 120 CAPSULE | Refills: 11 | Status: SHIPPED | OUTPATIENT
Start: 2020-05-19 | End: 2021-05-06 | Stop reason: SDUPTHER

## 2020-05-19 NOTE — PROGRESS NOTES
[facility]    Chief Complaint   Patient presents with   • Heartburn       Subjective     HPI    Maria Victoria Aranda is a 55 y.o. female who presents with a chief complaint of Crohn's    Since she was here last year in April, she underwent endoscopy and colonoscopy in May 2019.  Colonoscopy revealed 1 small aphthous ulcer in the terminal ileum.  She was advised to quit smoking, avoid NSAIDs and continue Pentasa.  Upper endoscopy was unremarkable.  Biopsies of the GE junction were unremarkable.    She tells me she is asymptomatic.  She wants a while have some loose stools.  Usually occurs if she eats the wrong things.  Heartburn is under relatively good control.  She will have some discomfort and she points to her left shoulder at times.  She thinks is more of arthralgias in her shoulder and in her muscles of her chest.  She does not think she is getting that much reflux.  She denies any blood in her stools.  She is not losing weight of anything she is gained weight she states.  She has no lower abdominal cramps or discomfort.  She continues on Pentasa twice a day.  She also continues on her reflux medications.  She does miss a dose of her PPI then she has breakthrough symptoms.    =============== April 2019 HPI=====================    HISTORY OF PRESENT ILLNESS     Maria Victoria Aranda is a 54 y.o. female presents  with reflux.  Symptoms are mainly of a burning sensation of the chest as well as an aching sensation.  She has had trouble with this intermittently over the last year.  She has been on Dexilant over the last year which does not seem to control her symptoms.  She has tried Prevacid with no help.  Was on Protonix several years ago which did control her symptoms but her insurance would not pay for it.  Certain foods that can trigger spicy and greasy foods.  She does smoke.  States that she is trying to quit.  She is decreasing her caffeine intake as well.    She denies any exertional pain.  No  dysphagia.  No nausea vomiting.  No abdominal pain.  No unintentional weight loss.     Has had EGD in the past by Dr. Parada there was question of Drake's in the past per patient history.  Her last upper endoscopy by Dr. Parada was last year.     States that she was diagnosed with Crohn's disease 20 years ago.  Has been followed by Dr. Parada.  Has taken Pentasa over the last 20 years. Has never taken any other meds or steroids for crohns.   She does move her bowels daily.  No rectal bleeding.  No weight loss.  No nausea vomiting.  No abdominal pain.  No skin rash.  No joint pain.  No ocular pain. No history of abdominal surgeries for Crohn's.     She denies personal history of colon polyps or colon cancer.  She has a sister had a precancerous polyp requiring surgery at age 43.  Her mother had colon cancer at age 48.    Past Medical History:   Diagnosis Date   • Anxiety    • Depression    • Esophagitis determined by endoscopy    • PONV (postoperative nausea and vomiting)        Past Surgical History:   Procedure Laterality Date   • APPENDECTOMY     • CHOLECYSTECTOMY     • COLONOSCOPY     • COLONOSCOPY N/A 5/1/2019    Procedure: COLONOSCOPY WITH ANESTHESIA;  Surgeon: Sreedhar Ball MD;  Location: [facility] ENDOSCOPY;  Service: Gastroenterology   • CYST REMOVAL     • ENDOSCOPY  03/2018   • ENDOSCOPY N/A 5/1/2019    Procedure: ESOPHAGOGASTRODUODENOSCOPY WITH ANESTHESIA;  Surgeon: Sreedhar Ball MD;  Location: [facility] ENDOSCOPY;  Service: Gastroenterology   • HAND TENDON SURGERY     • HYSTERECTOMY     • OOPHORECTOMY     • TONSILLECTOMY           Current Outpatient Medications:   •  ezetimibe (ZETIA) 10 MG tablet, Take 1 tablet by mouth Daily., Disp: 30 tablet, Rfl: 5  •  pantoprazole (PROTONIX) 40 MG EC tablet, Take 1 tablet by mouth Daily., Disp: 30 tablet, Rfl: 11  •  PENTASA 500 MG CR capsule, Take 2 capsules by mouth 2 (Two) Times a Day., Disp: 120 capsule, Rfl: 11  •  calcium-vitamin D (OSCAL 500/200  D-3) 500-200 MG-UNIT per tablet, Take 1 tablet by mouth Daily., Disp: 30 tablet, Rfl: 11  •  cyclobenzaprine (FLEXERIL) 5 MG tablet, Take 1 tablet by mouth 3 (Three) Times a Day As Needed for Muscle Spasms., Disp: 15 tablet, Rfl: 0  •  folic acid (FOLVITE) 800 MCG tablet, Take 1 tablet by mouth Daily., Disp: 30 tablet, Rfl: 5  •  topiramate (TOPAMAX) 25 MG tablet, , Disp: , Rfl:   •  vitamin B-12 (CYANOCOBALAMIN) 1000 MCG tablet, Take 1 tablet by mouth Daily., Disp: 30 tablet, Rfl: 5    Current Facility-Administered Medications:   •  meclizine (ANTIVERT) tablet 25 mg, 25 mg, Oral, TID PRN, JANN Hugo MD    Allergies   Allergen Reactions   • Wasp Venom Itching       Social History     Socioeconomic History   • Marital status:      Spouse name: Not on file   • Number of children: Not on file   • Years of education: Not on file   • Highest education level: Not on file   Tobacco Use   • Smoking status: Current Every Day Smoker     Packs/day: 1.00   • Smokeless tobacco: Never Used   Substance and Sexual Activity   • Alcohol use: Yes     Comment: rare   • Drug use: No   • Sexual activity: Yes     Birth control/protection: Surgical       Family History   Problem Relation Age of Onset   • Hypertension Father    • Heart disease Paternal Grandmother    • Colon cancer Mother         at age 48   • Colon polyps Sister         precancerous polyp requring surgery at age 43.    • Breast cancer Neg Hx    • Ovarian cancer Neg Hx        Review of Systems  General no fever chills or sweats weight stable  Gastrointestinal: Not present-abdominal pain, constipation, diarrhea, dysphagia, hematemesis, melena, odynophagia, nausea, vomiting, pyrosis, regurgitation, hematochezia,    Objective     Vitals:    05/19/20 1428   BP: 122/76   Pulse: 90   Temp: 97.7 °F (36.5 °C)   SpO2: 98%       Physical Exam  No acute distress. Vital signs as documented. Skin warm and dry and without overt rashes. EOMI, sclera anicteric.  Neck without  JVD or masses. Lungs clear to auscultation bilaterally, no rales. Heart exam notable for regular rhythm, normal sounds and absence of loud murmurs, rubs or gallops. Abdomen is soft, nontender, non distended, normal bowel sounds and without evidence of organomegaly, masses, or abdominal aortic enlargement. Extremities nonedematous, no cyanosis. Neuro alert, moves extremities.        Assessment/Plan   Problem List Items Addressed This Visit        Digestive    Gastroesophageal reflux disease    Relevant Medications    pantoprazole (PROTONIX) 40 MG EC tablet    Crohn's disease with complication (CMS/HCC) - Primary    Overview     Originally diagnosed by Dr. Parada before the year 2000.  Treated with Pentasa.  Never had surgery or immunosuppressive therapy.  Colonoscopy May 2019 revealed 1 small aphthous ulcer in the TI                 Regarding her Crohn's she is relatively asymptomatic.  She is always had relatively no symptoms with her Crohn's.  We talked about step up therapy.  With the lack of disease and lack of symptoms she does not want to pursue this route.  She has been relatively asymptomatic for 20+ years.  I think it is a reasonable choice.  Should continue Pentasa.  Continue with a healthy diet and healthy lifestyle.  We will see her back in a year for follow-up.  Sooner if she would develop any symptoms or concerns.  Of note she has not had lab work done in a year.  I suggested we get some.  She tells me she is getting this with her PCP within the next couple weeks.  Therefore should get with her PCP and contact me with any questions or concerns.    Regarding her reflux disease should continue on Protonix.  I reinforced reflux precautions.    Continue ongoing management by primary care provider and other specialists.     Patient's Body mass index is 25.99 kg/m². BMI is above normal parameters. Recommendations include: nutrition counseling.        EMR Dragon/transcription disclaimer:  Much of this  encounter note is electronic transcription/translation of spoken language to printed text.  The electronic translation of spoken language may be erroneous, or at times, nonsensical words or phrases may be inadvertently transcribed.  Although I have reviewed the note for such errors, some may still exist.    Sreedhar Ball MD  17:06  05/19/20

## 2020-06-08 ENCOUNTER — LAB (OUTPATIENT)
Dept: LAB | Facility: HOSPITAL | Age: 55
End: 2020-06-08

## 2020-06-08 DIAGNOSIS — Z00.00 HEALTHCARE MAINTENANCE: ICD-10-CM

## 2020-06-08 LAB
ALBUMIN SERPL-MCNC: 4.1 G/DL (ref 3.5–5.2)
ALBUMIN/GLOB SERPL: 1.6 G/DL
ALP SERPL-CCNC: 121 U/L (ref 39–117)
ALT SERPL W P-5'-P-CCNC: 14 U/L (ref 1–33)
ANION GAP SERPL CALCULATED.3IONS-SCNC: 9.4 MMOL/L (ref 5–15)
AST SERPL-CCNC: 12 U/L (ref 1–32)
BASOPHILS # BLD AUTO: 0.06 10*3/MM3 (ref 0–0.2)
BASOPHILS NFR BLD AUTO: 0.6 % (ref 0–1.5)
BILIRUB SERPL-MCNC: <0.2 MG/DL (ref 0.2–1.2)
BUN BLD-MCNC: 15 MG/DL (ref 6–20)
BUN/CREAT SERPL: 17.6 (ref 7–25)
CALCIUM SPEC-SCNC: 8.9 MG/DL (ref 8.6–10.5)
CHLORIDE SERPL-SCNC: 107 MMOL/L (ref 98–107)
CHOLEST SERPL-MCNC: 151 MG/DL (ref 0–200)
CO2 SERPL-SCNC: 23.6 MMOL/L (ref 22–29)
CREAT BLD-MCNC: 0.85 MG/DL (ref 0.57–1)
DEPRECATED RDW RBC AUTO: 42.4 FL (ref 37–54)
EOSINOPHIL # BLD AUTO: 0.09 10*3/MM3 (ref 0–0.4)
EOSINOPHIL NFR BLD AUTO: 0.9 % (ref 0.3–6.2)
ERYTHROCYTE [DISTWIDTH] IN BLOOD BY AUTOMATED COUNT: 13.9 % (ref 12.3–15.4)
GFR SERPL CREATININE-BSD FRML MDRD: 70 ML/MIN/1.73
GLOBULIN UR ELPH-MCNC: 2.5 GM/DL
GLUCOSE BLD-MCNC: 94 MG/DL (ref 65–99)
HCT VFR BLD AUTO: 38.5 % (ref 34–46.6)
HDLC SERPL-MCNC: 34 MG/DL (ref 40–60)
HGB BLD-MCNC: 12.6 G/DL (ref 12–15.9)
IMM GRANULOCYTES # BLD AUTO: 0.06 10*3/MM3 (ref 0–0.05)
IMM GRANULOCYTES NFR BLD AUTO: 0.6 % (ref 0–0.5)
LDLC SERPL CALC-MCNC: 89 MG/DL (ref 0–100)
LDLC/HDLC SERPL: 2.61 {RATIO}
LYMPHOCYTES # BLD AUTO: 2.03 10*3/MM3 (ref 0.7–3.1)
LYMPHOCYTES NFR BLD AUTO: 20.8 % (ref 19.6–45.3)
MCH RBC QN AUTO: 27.6 PG (ref 26.6–33)
MCHC RBC AUTO-ENTMCNC: 32.7 G/DL (ref 31.5–35.7)
MCV RBC AUTO: 84.2 FL (ref 79–97)
MONOCYTES # BLD AUTO: 0.48 10*3/MM3 (ref 0.1–0.9)
MONOCYTES NFR BLD AUTO: 4.9 % (ref 5–12)
NEUTROPHILS # BLD AUTO: 7.03 10*3/MM3 (ref 1.7–7)
NEUTROPHILS NFR BLD AUTO: 72.2 % (ref 42.7–76)
NRBC BLD AUTO-RTO: 0 /100 WBC (ref 0–0.2)
PLATELET # BLD AUTO: 262 10*3/MM3 (ref 140–450)
PMV BLD AUTO: 13 FL (ref 6–12)
POTASSIUM BLD-SCNC: 3.9 MMOL/L (ref 3.5–5.2)
PROT SERPL-MCNC: 6.6 G/DL (ref 6–8.5)
RBC # BLD AUTO: 4.57 10*6/MM3 (ref 3.77–5.28)
SODIUM BLD-SCNC: 140 MMOL/L (ref 136–145)
TRIGL SERPL-MCNC: 141 MG/DL (ref 0–150)
VLDLC SERPL-MCNC: 28.2 MG/DL (ref 5–40)
WBC NRBC COR # BLD: 9.75 10*3/MM3 (ref 3.4–10.8)

## 2020-06-08 PROCEDURE — 80053 COMPREHEN METABOLIC PANEL: CPT | Performed by: INTERNAL MEDICINE

## 2020-06-08 PROCEDURE — 36415 COLL VENOUS BLD VENIPUNCTURE: CPT

## 2020-06-08 PROCEDURE — 85025 COMPLETE CBC W/AUTO DIFF WBC: CPT | Performed by: INTERNAL MEDICINE

## 2020-06-08 PROCEDURE — 80061 LIPID PANEL: CPT | Performed by: INTERNAL MEDICINE

## 2020-06-11 ENCOUNTER — OFFICE VISIT (OUTPATIENT)
Dept: INTERNAL MEDICINE | Facility: CLINIC | Age: 55
End: 2020-06-11

## 2020-06-11 VITALS
RESPIRATION RATE: 18 BRPM | OXYGEN SATURATION: 98 % | SYSTOLIC BLOOD PRESSURE: 112 MMHG | WEIGHT: 162 LBS | BODY MASS INDEX: 26.03 KG/M2 | TEMPERATURE: 98.4 F | HEART RATE: 72 BPM | DIASTOLIC BLOOD PRESSURE: 78 MMHG | HEIGHT: 66 IN

## 2020-06-11 DIAGNOSIS — Z00.00 HEALTHCARE MAINTENANCE: Primary | ICD-10-CM

## 2020-06-11 DIAGNOSIS — Z72.0 TOBACCO USE: ICD-10-CM

## 2020-06-11 PROCEDURE — 99213 OFFICE O/P EST LOW 20 MIN: CPT | Performed by: INTERNAL MEDICINE

## 2020-06-11 RX ORDER — HYDROCODONE BITARTRATE AND ACETAMINOPHEN 5; 325 MG/1; MG/1
1 TABLET ORAL AS NEEDED
COMMUNITY
Start: 2020-03-10 | End: 2023-03-01

## 2020-06-11 NOTE — PROGRESS NOTES
"Chief Complaint   Patient presents with   • Office Visit         History:  Maria Victoria Aranda is a 55 y.o. female who presents today for evaluation of the above problems.    She is here for a 6-month follow-up.  Overall she is doing well.  Since last visit she has had a mammogram for breast cancer screening and they recommend six-month follow-up.  This had to be rescheduled due to COVID-19.  It is now scheduled for July    She has been unsuccessful quitting smoking and her Wellbutrin caused the headaches stopped.  She was doing well cutting back on smoking until COVID-19.    She had labs done on June 8 and these were reviewed with the patient today.  Overall, they look very good.      HPI    ROS:  Review of Systems   Constitutional: Negative for activity change and appetite change.   HENT: Positive for postnasal drip.    Respiratory: Negative.    Cardiovascular: Negative.    Gastrointestinal: Negative.    Genitourinary:        Left breast pain chronic   Psychiatric/Behavioral: Negative.  The patient is not nervous/anxious.        Allergies   Allergen Reactions   • Wasp Venom Itching         Current Outpatient Medications:   •  ezetimibe (ZETIA) 10 MG tablet, Take 1 tablet by mouth Daily., Disp: 30 tablet, Rfl: 5  •  HYDROcodone-acetaminophen (NORCO) 5-325 MG per tablet, Take 1 tablet by mouth As Needed., Disp: , Rfl:   •  pantoprazole (PROTONIX) 40 MG EC tablet, Take 1 tablet by mouth Daily., Disp: 30 tablet, Rfl: 11  •  PENTASA 500 MG CR capsule, Take 2 capsules by mouth 2 (Two) Times a Day., Disp: 120 capsule, Rfl: 11  No current facility-administered medications for this visit.     OBJECTIVE:  Visit Vitals  /78 (BP Location: Left arm, Patient Position: Sitting, Cuff Size: Adult)   Pulse 72   Temp 98.4 °F (36.9 °C) (Oral)   Resp 18   Ht 167.6 cm (65.98\")   Wt 73.5 kg (162 lb)   SpO2 98%   BMI 26.16 kg/m²        Physical Exam   Constitutional: She appears well-developed and well-nourished. No distress.   HENT: "   Head: Normocephalic and atraumatic.   Eyes: Pupils are equal, round, and reactive to light. EOM are normal.   Neck: Phonation normal. No thyromegaly present.   Pulmonary/Chest: No respiratory distress.   Neurological: She is alert.   Skin: She is not diaphoretic. No erythema. No pallor.   Psychiatric: She has a normal mood and affect. Her behavior is normal. Judgment and thought content normal.       Procedures     Assessment/Plan    Maria Victoria was seen today for office visit.    Diagnoses and all orders for this visit:    Healthcare maintenance    Tobacco use      She is doing well.  She was fairly close to quitting smoking but due to COVID-19 increased amount of cigarettes.  Her repeat mammogram is now scheduled for July.  There is no issues with her controlled Crohn's disease.    Follow-up 6 months or sooner if clinically indicated    Education materials and an After Visit Summary were printed and given to the patient at discharge.    Return in about 6 months (around 12/11/2020) for Recheck.          MOHAMUD Hugo MD   8:56 AM  6/11/2020

## 2020-06-12 ENCOUNTER — OFFICE VISIT (OUTPATIENT)
Dept: OBSTETRICS AND GYNECOLOGY | Facility: CLINIC | Age: 55
End: 2020-06-12

## 2020-06-12 VITALS
WEIGHT: 160 LBS | SYSTOLIC BLOOD PRESSURE: 118 MMHG | DIASTOLIC BLOOD PRESSURE: 76 MMHG | HEIGHT: 66 IN | BODY MASS INDEX: 25.71 KG/M2

## 2020-06-12 DIAGNOSIS — Z01.419 WELL WOMAN EXAM WITH ROUTINE GYNECOLOGICAL EXAM: Primary | ICD-10-CM

## 2020-06-12 PROCEDURE — 99396 PREV VISIT EST AGE 40-64: CPT | Performed by: NURSE PRACTITIONER

## 2020-06-12 NOTE — PROGRESS NOTES
Subjective   Maria Victoria Aranda is a 55 y.o. female  YOB: 1965        Chief Complaint   Patient presents with   • Gynecologic Exam     Patient here for yearly exam. Last exam was done 03/01/19. Patient has had a RODRICK w/ BSO due to bleeding issues.  Patient's PCP has ordered patient's mammogram and it has been scheduled. Patient voices no complaints or concerns today.        Gynecologic Exam   The patient's pertinent negatives include no pelvic pain. Pertinent negatives include no abdominal pain, back pain, constipation, diarrhea, dysuria, fever, frequency, hematuria, nausea, rash, sore throat, urgency or vomiting.       The following portions of the patient's history were reviewed and updated as appropriate: allergies, current medications, past family history, past medical history, past social history, past surgical history and problem list.    Allergies   Allergen Reactions   • Wasp Venom Itching       Past Medical History:   Diagnosis Date   • Anxiety    • Esophagitis determined by endoscopy    • PONV (postoperative nausea and vomiting)        Family History   Problem Relation Age of Onset   • Hypertension Father    • Heart disease Paternal Grandmother    • Colon cancer Mother         at age 48   • Colon polyps Sister         precancerous polyp requring surgery at age 43.    • Breast cancer Neg Hx    • Ovarian cancer Neg Hx        Social History     Socioeconomic History   • Marital status:      Spouse name: Not on file   • Number of children: Not on file   • Years of education: Not on file   • Highest education level: Not on file   Tobacco Use   • Smoking status: Current Every Day Smoker     Packs/day: 1.00   • Smokeless tobacco: Never Used   Substance and Sexual Activity   • Alcohol use: Yes     Comment: rare   • Drug use: No   • Sexual activity: Yes     Birth control/protection: Surgical         Current Outpatient Medications:   •  ezetimibe (ZETIA) 10 MG tablet, Take 1 tablet by mouth  Daily., Disp: 30 tablet, Rfl: 5  •  HYDROcodone-acetaminophen (NORCO) 5-325 MG per tablet, Take 1 tablet by mouth As Needed., Disp: , Rfl:   •  pantoprazole (PROTONIX) 40 MG EC tablet, Take 1 tablet by mouth Daily., Disp: 30 tablet, Rfl: 11  •  PENTASA 500 MG CR capsule, Take 2 capsules by mouth 2 (Two) Times a Day., Disp: 120 capsule, Rfl: 11    No LMP recorded. Patient has had a hysterectomy.    Sexual History:         Could not be calculated    Past Surgical History:   Procedure Laterality Date   • APPENDECTOMY     • CHOLECYSTECTOMY     • COLONOSCOPY     • COLONOSCOPY N/A 5/1/2019    Procedure: COLONOSCOPY WITH ANESTHESIA;  Surgeon: Sreedhar Ball MD;  Location: [facility] ENDOSCOPY;  Service: Gastroenterology   • CYST REMOVAL     • CYST REMOVAL      hand   • ENDOSCOPY  03/2018   • ENDOSCOPY N/A 5/1/2019    Procedure: ESOPHAGOGASTRODUODENOSCOPY WITH ANESTHESIA;  Surgeon: Sreedhar Ball MD;  Location: [facility] ENDOSCOPY;  Service: Gastroenterology   • HAND TENDON SURGERY     • OOPHORECTOMY     • SALPINGECTOMY     • TONSILLECTOMY     • TOTAL ABDOMINAL HYSTERECTOMY WITH SALPINGO OOPHORECTOMY      RODRICK w/ BSO due to bleeding problems       Review of Systems   Constitutional: Negative for activity change, appetite change, fatigue, fever, unexpected weight gain and unexpected weight loss.   HENT: Negative for congestion, ear pain, hearing loss, nosebleeds, rhinorrhea, sore throat, tinnitus and trouble swallowing.    Eyes: Negative for blurred vision, pain, discharge, itching and visual disturbance.   Respiratory: Negative for apnea, chest tightness, shortness of breath and wheezing.    Cardiovascular: Negative for chest pain and leg swelling.   Gastrointestinal: Negative for abdominal pain, blood in stool, constipation, diarrhea, nausea, vomiting and GERD.   Endocrine: Negative for heat intolerance, polydipsia and polyuria.   Genitourinary: Negative for breast lump, decreased libido, difficulty urinating, dyspareunia,  dysuria, frequency, genital sores, hematuria, menstrual problem, pelvic pain, urgency, urinary incontinence and vaginal pain.   Musculoskeletal: Negative for arthralgias, back pain, joint swelling and myalgias.   Skin: Negative for color change, rash and skin lesions.   Allergic/Immunologic: Negative for environmental allergies, food allergies and immunocompromised state.   Neurological: Negative for dizziness, tremors, seizures, syncope, facial asymmetry, numbness and headache.   Hematological: Negative for adenopathy. Does not bruise/bleed easily.   Psychiatric/Behavioral: Negative for agitation, hallucinations, sleep disturbance, suicidal ideas and depressed mood. The patient is not nervous/anxious.        Objective   Physical Exam   Constitutional: She is oriented to person, place, and time. She appears well-developed and well-nourished. No distress.   HENT:   Head: Normocephalic.   Right Ear: External ear normal.   Left Ear: External ear normal.   Nose: Nose normal.   Mouth/Throat: Oropharynx is clear and moist.   Eyes: Conjunctivae are normal. Right eye exhibits no discharge. Left eye exhibits no discharge. No scleral icterus.   Neck: Normal range of motion. Neck supple. Carotid bruit is not present. No tracheal deviation present. No thyromegaly present.   Cardiovascular: Normal rate, regular rhythm, normal heart sounds and intact distal pulses.   No murmur heard.  Pulmonary/Chest: Effort normal and breath sounds normal. No respiratory distress. She has no wheezes. Right breast exhibits no inverted nipple, no mass, no nipple discharge, no skin change and no tenderness. Left breast exhibits no inverted nipple, no mass, no nipple discharge, no skin change and no tenderness. No breast swelling, tenderness, discharge or bleeding. Breasts are symmetrical.   Abdominal: Soft. She exhibits no distension and no mass. There is no tenderness. There is no guarding. No hernia. Hernia confirmed negative in the right  inguinal area and confirmed negative in the left inguinal area.   Genitourinary: Rectum normal, vagina normal and uterus normal. Rectal exam shows no mass. No breast swelling, tenderness, discharge or bleeding. Pelvic exam was performed with patient supine. There is no rash, tenderness, lesion or injury on the right labia. There is no rash, tenderness, lesion or injury on the left labia. Uterus is not enlarged, not fixed and not tender. Cervix exhibits no motion tenderness, no discharge and no friability. Right adnexum displays no mass, no tenderness and no fullness. Left adnexum displays no mass, no tenderness and no fullness. No erythema, tenderness or bleeding in the vagina. No foreign body in the vagina. No signs of injury around the vagina. No vaginal discharge found.   Genitourinary Comments:   BSU normal  Urethral meatus  Normal  Perineum  Normal   Musculoskeletal: Normal range of motion. She exhibits no edema or tenderness.   Lymphadenopathy:        Head (right side): No submental, no submandibular, no tonsillar, no preauricular, no posterior auricular and no occipital adenopathy present.        Head (left side): No submental, no submandibular, no tonsillar, no preauricular, no posterior auricular and no occipital adenopathy present.     She has no cervical adenopathy.        Right cervical: No superficial cervical, no deep cervical and no posterior cervical adenopathy present.       Left cervical: No superficial cervical, no deep cervical and no posterior cervical adenopathy present.     She has no axillary adenopathy.        Right: No inguinal adenopathy present.        Left: No inguinal adenopathy present.   Neurological: She is alert and oriented to person, place, and time. Coordination normal.   Skin: Skin is warm and dry. No bruising and no rash noted. She is not diaphoretic. No erythema.   Psychiatric: She has a normal mood and affect. Her behavior is normal. Judgment and thought content normal.  "  Nursing note and vitals reviewed.        Vitals:    06/12/20 1055   BP: 118/76   Weight: 72.6 kg (160 lb)   Height: 167.6 cm (66\")       There are no diagnoses linked to this encounter.      Patient's Body mass index is 25.82 kg/m². BMI is within normal parameters. No follow-up required..             Non-Smoker    MyChart Instructions Given       "

## 2020-07-07 ENCOUNTER — HOSPITAL ENCOUNTER (OUTPATIENT)
Dept: MAMMOGRAPHY | Facility: HOSPITAL | Age: 55
Discharge: HOME | End: 2020-07-07
Admitting: INTERNAL MEDICINE

## 2020-07-07 DIAGNOSIS — Z12.31 BREAST CANCER SCREENING BY MAMMOGRAM: ICD-10-CM

## 2020-07-07 PROCEDURE — 77063 BREAST TOMOSYNTHESIS BI: CPT

## 2020-07-07 PROCEDURE — 77067 SCR MAMMO BI INCL CAD: CPT

## 2021-05-06 ENCOUNTER — OFFICE VISIT (OUTPATIENT)
Dept: GASTROENTEROLOGY | Facility: CLINIC | Age: 56
End: 2021-05-06

## 2021-05-06 VITALS
OXYGEN SATURATION: 98 % | DIASTOLIC BLOOD PRESSURE: 78 MMHG | HEIGHT: 66 IN | WEIGHT: 168 LBS | TEMPERATURE: 97.1 F | HEART RATE: 78 BPM | BODY MASS INDEX: 27 KG/M2 | SYSTOLIC BLOOD PRESSURE: 122 MMHG

## 2021-05-06 DIAGNOSIS — K50.919 CROHN'S DISEASE WITH COMPLICATION, UNSPECIFIED GASTROINTESTINAL TRACT LOCATION (HCC): Primary | ICD-10-CM

## 2021-05-06 DIAGNOSIS — K21.9 GASTROESOPHAGEAL REFLUX DISEASE WITHOUT ESOPHAGITIS: ICD-10-CM

## 2021-05-06 PROCEDURE — 99213 OFFICE O/P EST LOW 20 MIN: CPT | Performed by: INTERNAL MEDICINE

## 2021-05-06 RX ORDER — DULOXETIN HYDROCHLORIDE 60 MG/1
60 CAPSULE, DELAYED RELEASE ORAL DAILY
COMMUNITY

## 2021-05-06 RX ORDER — PANTOPRAZOLE SODIUM 40 MG/1
40 TABLET, DELAYED RELEASE ORAL DAILY
Qty: 30 TABLET | Refills: 11 | Status: SHIPPED | OUTPATIENT
Start: 2021-05-06 | End: 2022-05-09 | Stop reason: SDUPTHER

## 2021-05-06 RX ORDER — MESALAMINE 500 MG/1
1000 CAPSULE ORAL 2 TIMES DAILY
Qty: 120 CAPSULE | Refills: 11 | Status: SHIPPED | OUTPATIENT
Start: 2021-05-06 | End: 2022-05-06

## 2021-05-06 NOTE — PROGRESS NOTES
[facility] Gastroenterology    Chief Complaint   Patient presents with   • Crohn's Disease       Subjective     HPI    Maria Victoria Aranda is a 56 y.o. female who presents with a chief complaint of Crohn's.    She tells me she is doing well.  Denies abdominal pain.  She states she occasionally has loose stools.  Back in winter she had a loose stool maybe once a week.  Now she maybe has loose stool once a month.  Sometimes it is related to what she eats.  She never passed any blood or mucus.  She denies any abdominal cramping.  Her weights been stable.  She is not having significant heartburn.  Denies dysphagia.  She states she is at her baseline.  She has no complaints.  She tells me she just recently had lab work done by her PCP.      ============== May 2020 HPI= 9==========================================  HPI     Maria Victoria Aranda is a 55 y.o. female who presents with a chief complaint of Crohn's     Since she was here last year in April, she underwent endoscopy and colonoscopy in May 2019.  Colonoscopy revealed 1 small aphthous ulcer in the terminal ileum.  She was advised to quit smoking, avoid NSAIDs and continue Pentasa.  Upper endoscopy was unremarkable.  Biopsies of the GE junction were unremarkable.     She tells me she is asymptomatic.  She wants a while have some loose stools.  Usually occurs if she eats the wrong things.  Heartburn is under relatively good control.  She will have some discomfort and she points to her left shoulder at times.  She thinks is more of arthralgias in her shoulder and in her muscles of her chest.  She does not think she is getting that much reflux.  She denies any blood in her stools.  She is not losing weight of anything she is gained weight she states.  She has no lower abdominal cramps or discomfort.  She continues on Pentasa twice a day.  She also continues on her reflux medications.  She does miss a dose of her PPI then she has breakthrough symptoms.     =============== April  2019 HPI=====================     HISTORY OF PRESENT ILLNESS     Maria Victoria Aranda is a 54 y.o. female presents  with reflux.  Symptoms are mainly of a burning sensation of the chest as well as an aching sensation.  She has had trouble with this intermittently over the last year.  She has been on Dexilant over the last year which does not seem to control her symptoms.  She has tried Prevacid with no help.  Was on Protonix several years ago which did control her symptoms but her insurance would not pay for it.  Certain foods that can trigger spicy and greasy foods.  She does smoke.  States that she is trying to quit.  She is decreasing her caffeine intake as well.    She denies any exertional pain.  No dysphagia.  No nausea vomiting.  No abdominal pain.  No unintentional weight loss.     Has had EGD in the past by Dr. Parada there was question of Drake's in the past per patient history.  Her last upper endoscopy by Dr. Parada was last year.     States that she was diagnosed with Crohn's disease 20 years ago.  Has been followed by Dr. Parada.  Has taken Pentasa over the last 20 years. Has never taken any other meds or steroids for crohns.   She does move her bowels daily.  No rectal bleeding.  No weight loss.  No nausea vomiting.  No abdominal pain.  No skin rash.  No joint pain.  No ocular pain. No history of abdominal surgeries for Crohn's.     She denies personal history of colon polyps or colon cancer.  She has a sister had a precancerous polyp requiring surgery at age 43.  Her mother had colon cancer at age 48.     Past Medical History:   Diagnosis Date   • Anxiety    • Esophagitis determined by endoscopy    • PONV (postoperative nausea and vomiting)        Past Surgical History:   Procedure Laterality Date   • APPENDECTOMY     • CHOLECYSTECTOMY     • COLONOSCOPY     • COLONOSCOPY N/A 5/1/2019    Procedure: COLONOSCOPY WITH ANESTHESIA;  Surgeon: Sreedhar Ball MD;  Location: [facility] ENDOSCOPY;  Service:  Gastroenterology   • CYST REMOVAL     • CYST REMOVAL      hand   • ENDOSCOPY  03/2018   • ENDOSCOPY N/A 5/1/2019    Procedure: ESOPHAGOGASTRODUODENOSCOPY WITH ANESTHESIA;  Surgeon: Sreedhar Ball MD;  Location: [facility] ENDOSCOPY;  Service: Gastroenterology   • HAND TENDON SURGERY     • OOPHORECTOMY     • SALPINGECTOMY     • TONSILLECTOMY     • TOTAL ABDOMINAL HYSTERECTOMY WITH SALPINGO OOPHORECTOMY      RODRICK w/ BSO due to bleeding problems         Current Outpatient Medications:   •  DULoxetine (CYMBALTA) 60 MG capsule, Take 60 mg by mouth Daily., Disp: , Rfl:   •  ezetimibe (ZETIA) 10 MG tablet, Take 1 tablet by mouth Daily., Disp: 30 tablet, Rfl: 5  •  pantoprazole (PROTONIX) 40 MG EC tablet, Take 1 tablet by mouth Daily., Disp: 30 tablet, Rfl: 11  •  Pentasa 500 MG CR capsule, Take 2 capsules by mouth 2 (Two) Times a Day., Disp: 120 capsule, Rfl: 11  •  HYDROcodone-acetaminophen (NORCO) 5-325 MG per tablet, Take 1 tablet by mouth As Needed., Disp: , Rfl:     Allergies   Allergen Reactions   • Wasp Venom Itching       Social History     Socioeconomic History   • Marital status:      Spouse name: Not on file   • Number of children: Not on file   • Years of education: Not on file   • Highest education level: Not on file   Tobacco Use   • Smoking status: Current Every Day Smoker     Packs/day: 1.00   • Smokeless tobacco: Never Used   Substance and Sexual Activity   • Alcohol use: Yes     Comment: rare   • Drug use: No   • Sexual activity: Yes     Birth control/protection: Surgical       Family History   Problem Relation Age of Onset   • Hypertension Father    • Heart disease Paternal Grandmother    • Colon cancer Mother         at age 48   • Colon polyps Sister         precancerous polyp requring surgery at age 43.    • Breast cancer Neg Hx    • Ovarian cancer Neg Hx        Review of Systems  General no fever chills or sweats weight stable  Gastrointestinal: Not present-abdominal pain, constipation,  diarrhea, dysphagia, hematemesis, melena, odynophagia, nausea, vomiting, pyrosis, regurgitation, hematochezia,    Objective     Vitals:    05/06/21 1436   BP: 122/78   Pulse: 78   Temp: 97.1 °F (36.2 °C)   SpO2: 98%       Physical Exam  No acute distress. Vital signs as documented.  Sclera anicteric.  Neck without noticeable JVD. Abdomen is soft, nontender, non distended, normal bowel sounds and without evidence of organomegaly, masses.  Neuro alert, moves extremities.        Assessment/Plan   Problem List Items Addressed This Visit        Gastrointestinal Abdominal     Gastroesophageal reflux disease    Relevant Medications    pantoprazole (PROTONIX) 40 MG EC tablet    Crohn's disease with complication (CMS/HCC) - Primary    Overview     Originally diagnosed by Dr. Parada before the year 2000.  Treated with Pentasa.  Never had surgery or immunosuppressive therapy.  Colonoscopy May 2019 revealed 1 small aphthous ulcer in the TI                 Regarding her Crohn's she remains asymptomatic.  She is gone over 20 years without requiring significant medical therapy and she is tolerating Pentasa well.  Her last colonoscopy showed one small aphthous ulcer in 2019.  Plan to continue Pentasa.  Plan close observation.  She develops symptoms she will contact us.  We will see her back in the office in 1 year.  Tentatively plan surveillance colonoscopy in 1 year.    Regarding her reflux disease she is relatively under good control with the Protonix.  I did suggest that she initiate reflux precautions and try to see if she can wean off the Protonix.  She can start taking it as needed as discussed.    Return office in 1 year sooner if needed.    Continue ongoing management by primary care provider and other specialists.     Patient's (Body mass index is 27.12 kg/m².)\  Healthy nutrition advised        EMR Dragon/transcription disclaimer:  Much of this encounter note is electronic transcription/translation of spoken language to  printed text.  The electronic translation of spoken language may be erroneous, or at times, nonsensical words or phrases may be inadvertently transcribed.  Although I have reviewed the note for such errors, some may still exist.    Sreedhar Ball MD  17:32 CDT  05/06/21

## 2021-05-11 ENCOUNTER — HOSPITAL ENCOUNTER (OUTPATIENT)
Dept: OCCUPATIONAL THERAPY | Age: 56
Setting detail: THERAPIES SERIES
Discharge: HOME | End: 2021-05-11
Payer: COMMERCIAL

## 2021-05-11 PROCEDURE — 97124 MASSAGE THERAPY: CPT

## 2021-05-11 PROCEDURE — 97035 APP MDLTY 1+ULTRASOUND EA 15: CPT

## 2021-05-11 PROCEDURE — 97165 OT EVAL LOW COMPLEX 30 MIN: CPT

## 2021-05-11 ASSESSMENT — PAIN DESCRIPTION - LOCATION: LOCATION: HAND

## 2021-05-11 ASSESSMENT — PAIN DESCRIPTION - ONSET: ONSET: AWAKENED FROM SLEEP

## 2021-05-11 ASSESSMENT — PAIN DESCRIPTION - FREQUENCY: FREQUENCY: INTERMITTENT

## 2021-05-11 ASSESSMENT — PAIN DESCRIPTION - PAIN TYPE: TYPE: CHRONIC PAIN

## 2021-05-11 ASSESSMENT — PAIN DESCRIPTION - DESCRIPTORS: DESCRIPTORS: NUMBNESS;ACHING;SORE

## 2021-05-11 ASSESSMENT — PAIN SCALES - GENERAL: PAINLEVEL_OUTOF10: 5

## 2021-05-11 ASSESSMENT — PAIN DESCRIPTION - ORIENTATION: ORIENTATION: LEFT

## 2021-05-11 NOTE — PROGRESS NOTES
Occupational Therapy  Occupational Therapy Initial Assessment  Date:  2021    Patient Name: Rosalva Hylton  MRN: [id number]     :  1965     Treatment Diagnosis: LUE hand pain M79.643, M25.542 Pain in joints of Left hand. M62.81 Weakness    Restrictions     Subjective   General  Chart Reviewed: Yes  Patient assessed for rehabilitation services?: Yes  Additional Pertinent Hx: Crohn's  Family / Caregiver Present: No  Referring Practitioner: MOODY Núñez  Diagnosis: CMC synovitis M65.9  OT Visit Information  Onset Date: 21  OT Insurance Information: BCBS  Subjective  Subjective: \"It has been bothering me for 6 months but really bad for 3-4 months. \"  General Comment  Comments: Pt has tenderness at the [facility]. Pain Assessment  Pain Assessment: 0-10  Pain Level: 5  Pain Type: Chronic pain  Pain Location: Hand  Pain Orientation: Left  Pain Descriptors: Numbness; Aching; Sore  Pain Frequency: Intermittent  Pain Onset: Awakened from sleep  Vital Signs  Patient Currently in Pain: Yes  Home Living  Social/Functional History  Lives With: Spouse  Type of Home: House  Bathroom Accessibility: Accessible  ADL Assistance: Independent  Homemaking Assistance: Independent  Homemaking Responsibilities: Yes  Ambulation Assistance: Independent  Transfer Assistance: Independent  Active : Yes  Mode of Transportation: Car  Occupation: Full time employment  Type of occupation: [employer]     Objective   Observation/Palpation  Posture: Good  Palpation: Pt expressed tenderness when thenar eminence of LUE. Observation: Pt did not present with redness or heat with thenar. Edema: No edema indicated.   ADL  Feeding: Independent  Grooming: Independent  UE Bathing: Independent  LE Bathing: Independent  UE Dressing: Independent  LE Dressing: Independent  Toileting: Independent     Sensation  Overall Sensation Status: Impaired  Additional Comments: Numbness in thumb/hand  LUE Strength  L Hand General: 4-/5  LUE Strength Comment: 4/5  RUE Strength  R Hand General: 4+/5  RUE Strength Comment: 4+/5     Assessment   Assessment  Performance deficits / Impairments: Decreased strength  Assessment: Pt reports that her thumb cmc joint is very painful and at times it feels like it is coming out. Pt reports numbness and dropping things. Pt reports the pain wakes her up sometimes. Pt cannot peel a potato. Treatment Diagnosis: LUE hand pain M79.643, M25.542 Pain in joints of Left hand. M62.81 Weakness  Prognosis: Good  Decision Making: Low Complexity  REQUIRES OT FOLLOW UP: Yes  Treatment Initiated : Ultrasound and ice massage to thenar eminence and cmc joint. Discharge Recommendations: Continue to assess pending progress    Quick Dash : 68.18% disability    Plan   Plan  Times per week: 2x wk  Plan weeks: 6 weeks  Current Treatment Recommendations: Strengthening, ROM, Pain Management, Modalities (comment)  Plan Comment: OT To make a splint to imobilize the thumb. OT to treat 2xwk for 6 weeks    Goals  Short term goals  Time Frame for Short term goals: 2 Weeks  Short term goal 1: Pt pain to decrease to 2-4/10 during use. Short term goal 2: Pt to tolerate splint during sleeping at night. Short term goal 3: Pt to increase lateral pinch to 14#. Short term goal 4: Pt to increase 3 jaw micheline pinch to 16#  Short term goal 5: Pt to be able to complete LUE thumb extension without pain. Long term goals  Time Frame for Long term goals : 4 weeks  Long term goal 1: Pt to decrease pain to 0-2/10. Long term goal 2: Pt to be able to open a jar using both hands. Long term goal 3: Pt to increase lateral pinch to 16#. Long term goal 4: Pt to be able to turn a door knob to open a door without pain. Long term goal 5: Pt to be able to count dollar bills using the LUE thumb without pain. Patient Goals   Patient goals : To be able to use the LUE hand without pain.        Therapy Time   Individual Concurrent Group Co-treatment   Time In 0800         Time Out [address] [address], OT Electronically signed by VIRGEN Murguia MOT, OTR/L on 5/11/2021 at 9:45 AM

## 2021-05-13 ENCOUNTER — HOSPITAL ENCOUNTER (OUTPATIENT)
Dept: OCCUPATIONAL THERAPY | Age: 56
Setting detail: THERAPIES SERIES
Discharge: HOME | End: 2021-05-13
Payer: COMMERCIAL

## 2021-05-13 PROCEDURE — 97760 ORTHOTIC MGMT&TRAING 1ST ENC: CPT

## 2021-05-13 PROCEDURE — 97530 THERAPEUTIC ACTIVITIES: CPT

## 2021-05-13 ASSESSMENT — PAIN DESCRIPTION - LOCATION: LOCATION: HAND

## 2021-05-13 ASSESSMENT — PAIN DESCRIPTION - FREQUENCY: FREQUENCY: INTERMITTENT

## 2021-05-13 ASSESSMENT — PAIN SCALES - GENERAL: PAINLEVEL_OUTOF10: 2

## 2021-05-13 ASSESSMENT — PAIN DESCRIPTION - ORIENTATION: ORIENTATION: LEFT

## 2021-05-13 NOTE — PROGRESS NOTES
Occupational Therapy  Daily Treatment Note  Date: 2021  Patient Name: Braden Velasquez  :  1965  MRN: [id number]       Subjective   General  Chart Reviewed: Yes  Patient assessed for rehabilitation services?: Yes  Additional Pertinent Hx: Crohn's  Family / Caregiver Present: No  Referring Practitioner: MOODY Sow  Diagnosis: CMC synovitis M65.9  OT Visit Information  Onset Date: 21  OT Insurance Information: BCBS  Total # of Visits Approved: 20  Total # of Visits to Date: 2  Certification Period Expiration Date: 21  Progress Note Due Date: 06/10/21  Progress Note Counter: 2  Subjective  Subjective: \"The hand ached and hurt after the ultrasound and ice. \"  General Comment  Comments: Pt has tenderness at the [facility] joint. Pre Treatment Pain Screening  Intervention List: Patient able to continue with treatment  Pain Assessment  Pain Assessment: 0-10  Pain Level: 2  Pain Location: Hand  Pain Orientation: Left  Pain Frequency: Intermittent  Vital Signs  Patient Currently in Pain: Yes   Treatment Activities:        Occupational therapy fabricated a splint for the patient's LUE thumb to remain still. Making the splint took 60 minutes. Assessment   Assessment: Patient requires a splint to be made for the LUE thumb to keep it still and let the [facility] joint rest.  Treatment Diagnosis: CMC tenosynovitis M65.9  Prognosis: Good  Discharge Recommendations: Continue to assess pending progress  REQUIRES OT FOLLOW UP: Yes         Plan   Plan  Times per week: 2  Plan weeks: 6  Plan Comment: A splint was fabricated for the patient to keep her LUE thumb still. Goals  Short term goals  Time Frame for Short term goals: 2 Weeks  Short term goal 1: Pt pain to decrease to 2-4/10 during use. Short term goal 2: Pt to tolerate splint during sleeping at night. Short term goal 3: Pt to increase lateral pinch to 14#.   Short term goal 4: Pt to increase 3 jaw micheline pinch to 16#  Short term goal 5: Pt to be able to

## 2021-05-25 ENCOUNTER — HOSPITAL ENCOUNTER (OUTPATIENT)
Dept: OCCUPATIONAL THERAPY | Age: 56
Setting detail: THERAPIES SERIES
Discharge: HOME | End: 2021-05-25
Payer: COMMERCIAL

## 2021-05-25 PROCEDURE — 97760 ORTHOTIC MGMT&TRAING 1ST ENC: CPT

## 2021-05-25 PROCEDURE — 97140 MANUAL THERAPY 1/> REGIONS: CPT

## 2021-05-25 PROCEDURE — 97035 APP MDLTY 1+ULTRASOUND EA 15: CPT

## 2021-05-25 NOTE — PROGRESS NOTES
Plan  Times per week: 2  Plan weeks: 6  Plan Comment: KT tape applied for [facility] support and relieve pain. Splint altered for better fit and adherence to splint regime. Goals  Short term goals  Time Frame for Short term goals: 2 Weeks  Short term goal 1: Pt pain to decrease to 2-4/10 during use. Short term goal 2: Pt to tolerate splint during sleeping at night. Short term goal 3: Pt to increase lateral pinch to 14#. Short term goal 4: Pt to increase 3 jaw micheline pinch to 16#  Short term goal 5: Pt to be able to complete LUE thumb extension without pain. Long term goals  Time Frame for Long term goals : 4 weeks  Long term goal 1: Pt to decrease pain to 0-2/10. Long term goal 2: Pt to be able to open a jar using both hands. Long term goal 3: Pt to increase lateral pinch to 16#. Long term goal 4: Pt to be able to turn a door knob to open a door without pain. Long term goal 5: Pt to be able to count dollar bills using the LUE thumb without pain. Patient Goals   Patient goals : To be able to use the LUE hand without pain.     Therapy Time   Individual Concurrent Group Co-treatment   Time In 0901         Time Out 1001         Minutes 60         Timed Code Treatment Minutes: [address] [facility], OT Electronically signed by RENÉ Cline on 5/25/2021 at 10:20 AM

## 2021-05-27 ENCOUNTER — HOSPITAL ENCOUNTER (OUTPATIENT)
Dept: OCCUPATIONAL THERAPY | Age: 56
Setting detail: THERAPIES SERIES
Discharge: HOME | End: 2021-05-27
Payer: COMMERCIAL

## 2021-05-27 PROCEDURE — 97760 ORTHOTIC MGMT&TRAING 1ST ENC: CPT

## 2021-05-27 PROCEDURE — 97140 MANUAL THERAPY 1/> REGIONS: CPT

## 2021-05-27 PROCEDURE — 97035 APP MDLTY 1+ULTRASOUND EA 15: CPT

## 2021-05-27 NOTE — PROGRESS NOTES
Daily Treatment Note  Date: 2021  Patient Name: Zaira Grimaldo  :  1965  MRN: [id number]       Subjective   General  Chart Reviewed: Yes  Patient assessed for rehabilitation services?: Yes  Additional Pertinent Hx: Crohn's  Family / Caregiver Present: No  Referring Practitioner: Marvella Sicard, PAC  Diagnosis: CMC synovitis M65.9  OT Visit Information  Onset Date: 21  OT Insurance Information: BCBS  Total # of Visits Approved: 20  Total # of Visits to Date: 4  Certification Period Expiration Date: 21  Progress Note Due Date: 06/10/21  Subjective  Subjective: \"It's just sore. The sharpness has iron gone away. \"  General Comment  Comments: Pt has tenderness at the [facility] joint. Pre Treatment Pain Screening  Intervention List: Patient able to continue with treatment  Comments / Details: No pain at rest, but with CMC/thumb movement patient has pain. Vital Signs  Patient Currently in Pain: No     Treatment Activities:    Other exercises  Other exercises?: Yes  Other exercises 1: Manual therapy on LUE forearm extensors                        Modalities: Yes  Ultrasound  Ultrasound Location: LUE CMC joint following extensor tendons distally and proximally  Ultrasound Parameters: 1 mHz, .7 intensity, 10 minutes-- in water submersion  Ultrasound Goals: Edema;Pain     Assessment   Assessment: Patient tolerated session well this date with no s/s of adverse reactions. Patient tolerated extended ultrasound time this date and reports \"sharp\" pain has iron gone away. [facility] joint still sore. Manual STM applied throughout extensors and on thenar eminence. Patient tolerated KT taping for [employer] well and reports it helped last time with supporting CMC joint. Patient continues to demonstrate adherence to splint wearing schedule throughout day and night. Patient continues to benefit from skilled OT services.   Treatment Diagnosis: CMC tenosynovitis M65.9  Prognosis: Good  Discharge Recommendations: Continue to assess pending progress  REQUIRES OT FOLLOW UP: Yes  Timed Code Treatment Minutes: [address]  Times per week: 2  Plan weeks: 6  Plan Comment: KT tape applied for [facility] support and relieve pain. Splint altered for better fit and adherence to splint regime. Cushion placed in thumb region for protection of IP joint. Goals  Short term goals  Time Frame for Short term goals: 2 Weeks  Short term goal 1: Pt pain to decrease to 2-4/10 during use. Short term goal 2: Pt to tolerate splint during sleeping at night. Short term goal 3: Pt to increase lateral pinch to 14#. Short term goal 4: Pt to increase 3 jaw micheline pinch to 16#  Short term goal 5: Pt to be able to complete LUE thumb extension without pain. Long term goals  Time Frame for Long term goals : 4 weeks  Long term goal 1: Pt to decrease pain to 0-2/10. Long term goal 2: Pt to be able to open a jar using both hands. Long term goal 3: Pt to increase lateral pinch to 16#. Long term goal 4: Pt to be able to turn a door knob to open a door without pain. Long term goal 5: Pt to be able to count dollar bills using the LUE thumb without pain. Patient Goals   Patient goals : To be able to use the LUE hand without pain.     Therapy Time   Individual Concurrent Group Co-treatment   Time In 7449         Time Out 0945         Minutes 58         Timed Code Treatment Minutes: [address] OT Electronically signed by RENÉ Suarez on 5/27/2021 at 9:55 AM

## 2021-06-01 ENCOUNTER — HOSPITAL ENCOUNTER (OUTPATIENT)
Dept: OCCUPATIONAL THERAPY | Age: 56
Setting detail: THERAPIES SERIES
Discharge: HOME | End: 2021-06-01
Payer: COMMERCIAL

## 2021-06-01 PROCEDURE — 97140 MANUAL THERAPY 1/> REGIONS: CPT

## 2021-06-01 PROCEDURE — 97035 APP MDLTY 1+ULTRASOUND EA 15: CPT

## 2021-06-01 NOTE — PROGRESS NOTES
Daily Treatment Note  Date: 2021  Patient Name: Iris Clinton  :  1965  MRN: [id number]       Subjective   General  Chart Reviewed: Yes  Patient assessed for rehabilitation services?: Yes  Additional Pertinent Hx: Crohn's  Family / Caregiver Present: No  Referring Practitioner: MOODY Wolf  Diagnosis: CMC synovitis M65.9  OT Visit Information  Onset Date: 21  OT Insurance Information: BCBS  Total # of Visits Approved: 20  Total # of Visits to Date: 5  Certification Period Expiration Date: 21  Progress Note Due Date: 06/10/21  Progress Note Counter: 6 visits initially approved  General Comment  Comments: Pt has tenderness at the [facility] joint. Pre Treatment Pain Screening  Intervention List: Patient able to continue with treatment  Comments / Details: No pain at rest, but with CMC/thumb movement patient has pain. Vital Signs  Patient Currently in Pain: No     Treatment Activities:    Other exercises  Other exercises?: Yes  Other exercises 1: Manual therapy on LUE forearm extensors         Modalities: Yes  Ultrasound  Ultrasound Location: LUE CMC joint following extensor tendons distally and proximally  Ultrasound Parameters: 1 mHz, .9 intensity, 10 minutes-- in water submersion  Ultrasound Goals: Edema;Pain     Assessment   Assessment: Patient tolerated session well this date with no s/s of adverse reactions. Patient reports slight pain decrease. Splint modified the date for new cushion due to wear from wearing schedule. Patient continues to benefit from skilled OT services. Treatment Diagnosis: CMC tenosynovitis M65.9  Prognosis: Good  Discharge Recommendations: Continue to assess pending progress  REQUIRES OT FOLLOW UP: Yes  Timed Code Treatment Minutes: [address]  Times per week: 2  Plan weeks: 6  Plan Comment: KT tape applied for [facility] support and relieve pain. Splint altered for better fit and adherence to splint regime.     Goals  Short term goals  Time Frame for Short

## 2021-06-03 ENCOUNTER — APPOINTMENT (OUTPATIENT)
Dept: OCCUPATIONAL THERAPY | Age: 56
End: 2021-06-03
Payer: COMMERCIAL

## 2021-06-08 ENCOUNTER — HOSPITAL ENCOUNTER (OUTPATIENT)
Dept: OCCUPATIONAL THERAPY | Age: 56
Setting detail: THERAPIES SERIES
Discharge: HOME | End: 2021-06-08
Payer: COMMERCIAL

## 2021-06-08 PROCEDURE — 97035 APP MDLTY 1+ULTRASOUND EA 15: CPT

## 2021-06-08 PROCEDURE — 97140 MANUAL THERAPY 1/> REGIONS: CPT

## 2021-06-08 NOTE — PROGRESS NOTES
Daily Treatment Note  Date: 2021  Patient Name: Aleida Meza  :  1965  MRN: [id number]       Subjective   General  Chart Reviewed: Yes  Patient assessed for rehabilitation services?: Yes  Additional Pertinent Hx: Crohn's  Family / Caregiver Present: No  Referring Practitioner: MOODY Simeon  Diagnosis: CMC synovitis M65.9  OT Visit Information  Onset Date: 21  OT Insurance Information: BCBS  Total # of Visits Approved: 20  Total # of Visits to Date: 6  Certification Period Expiration Date: 21  Progress Note Due Date: 21  Progress Note Counter: Patient requires additional visits for continued pain management and ROM  Subjective  Subjective: \"It has gotten better, it's still just kind of achey when I move it. \"  General Comment  Comments: Pt has tenderness at the [facility] [place] joint. Pre Treatment Pain Screening  Intervention List: Patient able to continue with treatment  Comments / Details: No pain at rest, but with CMC/thumb movement patient has pain. Pain rated at a 3/10.   Vital Signs  Patient Currently in Pain: No     Treatment Activities:    Other exercises  Other exercises?: Yes  Other exercises 1: Manual therapy on LUE forearm extensors and thenar eminence   LUE AROM (degrees)  LUE AROM : Exceptions  L Wrist Flexion 0-80: 0-68  L Wrist Extension 0-70: 0-58  L Wrist Radial Deviation 0-20: 0-26  L Wrist Ulnar Deviation 0-45: 0-36  Left Hand AROM (degrees)  Left Hand AROM: Exceptions  L Thumb MCP 0-50: WFL  L Thumb IP 0-80: 0-50  L Thumb Radial ADduction/ABduction 0-55: WFL  L Thumb Palmar ADduction/ABduction 0-45: WFL  L Thumb Opposition: WFL        Modalities: Yes  Ultrasound  Ultrasound Location: LUE CMC joint following extensor tendons distally and proximally  Ultrasound Parameters: 3.3 mHz, 1.0 intensity, 8 minutes-- in water submersion  Ultrasound Goals: Edema;Pain        Hand Assessment Comment  Hand Assessment Comment:  strength and pinch strength not completed this date due to term goals  Time Frame for Long term goals : 4 weeks  Long term goal 1: Pt to decrease pain to 0-2/10. (Progressing- 3/10 pain during use)  Long term goal 2: Pt to be able to open a jar using both hands. (Progressing)  Long term goal 3: Pt to increase lateral pinch to 16#. (Progressing- will access next reassessment when pain has decreased further)  Long term goal 4: Pt to be able to turn a door knob to open a door without pain. (Progressing- slight pain 3/10)  Long term goal 5: Pt to be able to count dollar bills using the LUE thumb without pain. (Progressing- slight pain 3/10)  Patient Goals   Patient goals : To be able to use the LUE hand without pain.     Therapy Time   Individual Concurrent Group Co-treatment   Time In 1004         Time Out 1059         Minutes 55         Timed Code Treatment Minutes: [address], OT Electronically signed by RENÉ Morris on 6/8/2021 at 11:16 AM

## 2021-06-10 ENCOUNTER — HOSPITAL ENCOUNTER (OUTPATIENT)
Dept: OCCUPATIONAL THERAPY | Age: 56
Setting detail: THERAPIES SERIES
Discharge: HOME | End: 2021-06-10
Payer: COMMERCIAL

## 2021-06-10 PROCEDURE — 97035 APP MDLTY 1+ULTRASOUND EA 15: CPT

## 2021-06-10 PROCEDURE — 97140 MANUAL THERAPY 1/> REGIONS: CPT

## 2021-06-10 ASSESSMENT — PAIN DESCRIPTION - FREQUENCY: FREQUENCY: INTERMITTENT

## 2021-06-10 ASSESSMENT — PAIN DESCRIPTION - ORIENTATION: ORIENTATION: LEFT

## 2021-06-10 ASSESSMENT — PAIN DESCRIPTION - PAIN TYPE: TYPE: CHRONIC PAIN

## 2021-06-10 ASSESSMENT — PAIN SCALES - GENERAL: PAINLEVEL_OUTOF10: 1

## 2021-06-10 ASSESSMENT — PAIN DESCRIPTION - LOCATION: LOCATION: HAND

## 2021-06-10 ASSESSMENT — PAIN DESCRIPTION - PROGRESSION: CLINICAL_PROGRESSION: RAPIDLY IMPROVING

## 2021-06-10 ASSESSMENT — PAIN DESCRIPTION - DIRECTION: RADIATING_TOWARDS: THUMB

## 2021-06-10 ASSESSMENT — PAIN DESCRIPTION - DESCRIPTORS: DESCRIPTORS: ACHING;TINGLING;SORE

## 2021-06-10 ASSESSMENT — PAIN - FUNCTIONAL ASSESSMENT: PAIN_FUNCTIONAL_ASSESSMENT: PREVENTS OR INTERFERES SOME ACTIVE ACTIVITIES AND ADLS

## 2021-06-10 NOTE — PROGRESS NOTES
Daily Treatment Note  Date: 6/10/2021  Patient Name: Helena Smith  :  1965  MRN: [id number]       Subjective   General  Chart Reviewed: Yes  Patient assessed for rehabilitation services?: Yes  Additional Pertinent Hx: Crohn's  Family / Caregiver Present: No  Referring Practitioner: MOODY De La Torre  Diagnosis: CMC synovitis M65.9  OT Visit Information  Onset Date: 21  OT Insurance Information: BCBS  Total # of Visits Approved: 20  Total # of Visits to Date: 7  Certification Period Expiration Date: 21  Progress Note Due Date: 21  Progress Note Counter: Patient approved for an additional 5 visits on 21  Subjective  Subjective: \"It's gotten much better. It's my other thumb now. \"  Pre Treatment Pain Screening  Intervention List: Patient able to continue with treatment  Comments / Details: Patient reports pain in LUE thumb at 1/10 today, but RUE thumb 10/10 pain. Patient had to buy a pre-leif thump splint to improve sleep quality and decrease pain with use during activities. Pt seeing hand MD tomorrow for diagnosis. Pain Assessment  Pain Assessment: 0-10  Pain Level: 1  Pain Type: Chronic pain  Pain Location: Hand  Pain Orientation: Left  Pain Radiating Towards: thumb  Pain Descriptors: Aching;Tingling; Sore  Pain Frequency: Intermittent  Clinical Progression: Rapidly improving  Functional Pain Assessment: Prevents or interferes some active activities and ADLs  Non-Pharmaceutical Pain Intervention(s): Massage; Therapeutic presence; Therapeutic touch  Vital Signs  Patient Currently in Pain: Yes     Treatment Activities:    Other exercises  Other exercises?: Yes  Other exercises 1: Manual therapy on LUE forearm extensors and thenar eminence  Other exercises 2: LUE clothespins activity  Other exercises 3: LUE thumb radial and palmar abduction exercises  Other exercises 4: LUE radial and ulnar deviation exercises  Other exercises 5: LUE wrist flexion and extension exercises Modalities: Yes  Ultrasound  Ultrasound Location: LUE CMC joint following extensor tendons distally and proximally  Ultrasound Parameters: 3.3 mHz, 1.0 intensity, 8 minutes  Ultrasound Goals: Edema;Pain     Assessment   Assessment: Patient participated in session this date with no s/s of adverse reactions. Patient continues to rapidly improve with pain and plan to start light exercises next session for strengthening. Patient having new acute pain in RUE thumb for the last 1-2 weeks that she is going to the doctor tomorrow for a diagnosis and treatment plan. Patient continues to benefit from skilled OT services. Treatment Diagnosis: CMC tenosynovitis M65.9  Prognosis: Good  Discharge Recommendations: Continue to assess pending progress  REQUIRES OT FOLLOW UP: Yes  Timed Code Treatment Minutes: [address]  Times per week: 2  Plan weeks: 6  Plan Comment: Patient would continue to benefit from skilled OT services to continued pain reduction, due to progress with pain reduction with splint, ultrasound, manual therapy, and KT taping. Goals  Short term goals  Time Frame for Short term goals: 2 Weeks  Short term goal 1: Pt pain to decrease to 2-4/10 during use. (Goal Met- 3/10 pain with use)  Short term goal 2: Pt to tolerate splint during sleeping at night. (Goal Met- patient wearing at night and during the day)  Short term goal 3: Pt to increase lateral pinch to 14#. (Progressing- will access next reassessment when pain has decreased further)  Short term goal 4: Pt to increase 3 jaw micheline pinch to 16#. (Progressing- will access next reassessment when pain has decreased further)  Short term goal 5: Pt to be able to complete LUE thumb extension without pain. (Progressing- slight pain 3/10 with thumb extension)  Long term goals  Time Frame for Long term goals : 4 weeks  Long term goal 1: Pt to decrease pain to 0-2/10.  (Progressing- 3/10 pain during use)  Long term goal 2: Pt to be able to open a jar using both hands. (Progressing)  Long term goal 3: Pt to increase lateral pinch to 16#. (Progressing- will access next reassessment when pain has decreased further)  Long term goal 4: Pt to be able to turn a door knob to open a door without pain. (Progressing- slight pain 3/10)  Long term goal 5: Pt to be able to count dollar bills using the LUE thumb without pain. (Progressing- slight pain 3/10)  Patient Goals   Patient goals : To be able to use the LUE hand without pain.     Therapy Time   Individual Concurrent Group Co-treatment   Time In 1001         Time Out 1045         Minutes 44         Timed Code Treatment Minutes: [address] [facility],  Electronically signed by RENÉ Carbajal on 6/10/2021 at 12:13 PM

## 2021-08-30 ENCOUNTER — TRANSCRIBE ORDERS (OUTPATIENT)
Dept: ADMINISTRATIVE | Facility: HOSPITAL | Age: 56
End: 2021-08-30

## 2021-08-30 DIAGNOSIS — Z12.31 ENCOUNTER FOR SCREENING MAMMOGRAM FOR MALIGNANT NEOPLASM OF BREAST: Primary | ICD-10-CM

## 2021-09-10 ENCOUNTER — DOCUMENTATION (OUTPATIENT)
Dept: LAB | Facility: HOSPITAL | Age: 56
End: 2021-09-10

## 2021-09-10 DIAGNOSIS — Z80.3 FAMILY HISTORY OF BREAST CANCER: Primary | ICD-10-CM

## 2021-09-10 NOTE — PROGRESS NOTES
As part of a high-risk assessment, this patient was provided a questionnaire to assess personal and family history of cancer. Patients who meet [employer] criteria are offered genetic testing for hereditary cancer at their appointment. If this patient qualifies and consents to genetic testing, the results and management recommendations (when applicable) will be provided to the referring provider.

## 2021-09-13 ENCOUNTER — APPOINTMENT (OUTPATIENT)
Dept: MAMMOGRAPHY | Facility: HOSPITAL | Age: 56
End: 2021-09-13

## 2021-09-20 ENCOUNTER — APPOINTMENT (OUTPATIENT)
Dept: MAMMOGRAPHY | Facility: HOSPITAL | Age: 56
End: 2021-09-20

## 2021-11-04 ENCOUNTER — OFFICE VISIT (OUTPATIENT)
Dept: GASTROENTEROLOGY | Facility: CLINIC | Age: 56
End: 2021-11-04

## 2021-11-04 VITALS
HEIGHT: 66 IN | DIASTOLIC BLOOD PRESSURE: 74 MMHG | TEMPERATURE: 96.9 F | BODY MASS INDEX: 26.36 KG/M2 | OXYGEN SATURATION: 99 % | HEART RATE: 80 BPM | WEIGHT: 164 LBS | SYSTOLIC BLOOD PRESSURE: 132 MMHG

## 2021-11-04 DIAGNOSIS — R11.2 NAUSEA AND VOMITING, INTRACTABILITY OF VOMITING NOT SPECIFIED, UNSPECIFIED VOMITING TYPE: Primary | ICD-10-CM

## 2021-11-04 DIAGNOSIS — Z01.818 PRE-PROCEDURAL EXAMINATION: Primary | ICD-10-CM

## 2021-11-04 DIAGNOSIS — K50.00 CROHN'S DISEASE OF SMALL INTESTINE WITHOUT COMPLICATION (HCC): ICD-10-CM

## 2021-11-04 PROCEDURE — 99214 OFFICE O/P EST MOD 30 MIN: CPT | Performed by: NURSE PRACTITIONER

## 2021-11-04 NOTE — PROGRESS NOTES
"[facility] GASTROENTEROLOGY - OFFICE NOTE    11/4/2021    Maria Victoria Aranda   1965    Primary Physician: Paolo Murcia MD    Chief Complaint   Patient presents with   • Vomiting   • Nausea         HISTORY OF PRESENT ILLNESS:     Maria Victoria Aranda is a 56 y.o. female presents with nausea/vomiting for 2 months. She can have nausea with the vomiting. No triggers. Last episode was 2 weeks ago. Emesis is yellow/clear. Takes protonix daily.   No change in appetite. Has had some increased stress \" nothing major.   No new medications.  No fever or chills. No weight loss. No abdominal pain. No headaches.         No recent labs or imaging of the abdomen.         She has crohn's disease. Taking pentasa 2 twice daily. Has 1 bm /day. No constipated.  No rectal bleeding.       Last colonoscopy 5/2019  - Crohn's disease with ileitis. Biopsied.  - Internal hemorrhoids.  - The examination was otherwise normal on direct and retroflexion views.  - Background biopsies were taken from the cecum and rectum.      Final Diagnosis   1.  Gastroesophageal junction, biopsy:       Moderate chronic esophagogastritis.       No intestinal metaplasia.     2.  Terminal ileum, biopsy:       No evidence of active ileitis.       No adenomatous or dysplastic changes.     3.  Cecum, biopsy:       No evidence of active colitis.       No adenomatous or dysplastic changes.     4.  Rectum, biopsy:       No active colitis.       No adenomatous or dysplastic changes.           Last egd 5/2019   - Z-line variable, at the gastroesophageal junction. Biopsied.  - Normal stomach.  - Normal examined duodenum.      ====================================================  Ov 5-6-21 HPI  Maria Victoria Aranda is a 56 y.o. female who presents with a chief complaint of Crohn's.     She tells me she is doing well.  Denies abdominal pain.  She states she occasionally has loose stools.  Back in winter she had a loose stool maybe once a week.  Now she maybe has loose stool once a " month.  Sometimes it is related to what she eats.  She never passed any blood or mucus.  She denies any abdominal cramping.  Her weights been stable.  She is not having significant heartburn.  Denies dysphagia.  She states she is at her baseline.  She has no complaints.  She tells me she just recently had lab work done by her PCP.        ============== May 2020 HPI= 9==========================================  HPI     Maria Victoria Aranda is a 55 y.o. female who presents with a chief complaint of Crohn's     Since she was here last year in April, she underwent endoscopy and colonoscopy in May 2019.  Colonoscopy revealed 1 small aphthous ulcer in the terminal ileum.  She was advised to quit smoking, avoid NSAIDs and continue Pentasa.  Upper endoscopy was unremarkable.  Biopsies of the GE junction were unremarkable.     She tells me she is asymptomatic.  She wants a while have some loose stools.  Usually occurs if she eats the wrong things.  Heartburn is under relatively good control.  She will have some discomfort and she points to her left shoulder at times.  She thinks is more of arthralgias in her shoulder and in her muscles of her chest.  She does not think she is getting that much reflux.  She denies any blood in her stools.  She is not losing weight of anything she is gained weight she states.  She has no lower abdominal cramps or discomfort.  She continues on Pentasa twice a day.  She also continues on her reflux medications.  She does miss a dose of her PPI then she has breakthrough symptoms.     =============== April 2019 HPI=====================     HISTORY OF PRESENT ILLNESS     Maria Victoria Aranda is a 54 y.o. female presents  with reflux.  Symptoms are mainly of a burning sensation of the chest as well as an aching sensation.  She has had trouble with this intermittently over the last year.  She has been on Dexilant over the last year which does not seem to control her symptoms.  She has tried Prevacid with no help.   Was on Protonix several years ago which did control her symptoms but her insurance would not pay for it.  Certain foods that can trigger spicy and greasy foods.  She does smoke.  States that she is trying to quit.  She is decreasing her caffeine intake as well.    She denies any exertional pain.  No dysphagia.  No nausea vomiting.  No abdominal pain.  No unintentional weight loss.     Has had EGD in the past by Dr. Parada there was question of Drake's in the past per patient history.  Her last upper endoscopy by Dr. Parada was last year.     States that she was diagnosed with Crohn's disease 20 years ago.  Has been followed by Dr. Parada.  Has taken Pentasa over the last 20 years. Has never taken any other meds or steroids for crohns.   She does move her bowels daily.  No rectal bleeding.  No weight loss.  No nausea vomiting.  No abdominal pain.  No skin rash.  No joint pain.  No ocular pain. No history of abdominal surgeries for Crohn's.     She denies personal history of colon polyps or colon cancer.  She has a sister had a precancerous polyp requiring surgery at age 43.  Her mother had colon cancer at age 48    Past Medical History:   Diagnosis Date   • Anxiety    • Esophagitis determined by endoscopy    • PONV (postoperative nausea and vomiting)    • Trigger finger        Past Surgical History:   Procedure Laterality Date   • APPENDECTOMY     • CARPAL TUNNEL RELEASE     • CHOLECYSTECTOMY     • COLONOSCOPY     • COLONOSCOPY N/A 5/1/2019    Procedure: COLONOSCOPY WITH ANESTHESIA;  Surgeon: Sreedhar Ball MD;  Location: [facility] ENDOSCOPY;  Service: Gastroenterology   • CYST REMOVAL     • CYST REMOVAL      hand   • ENDOSCOPY  03/2018   • ENDOSCOPY N/A 5/1/2019    Procedure: ESOPHAGOGASTRODUODENOSCOPY WITH ANESTHESIA;  Surgeon: Sreedhar Ball MD;  Location: [facility] ENDOSCOPY;  Service: Gastroenterology   • HAND TENDON SURGERY     • OOPHORECTOMY     • SALPINGECTOMY     • TONSILLECTOMY     • TOTAL  "ABDOMINAL HYSTERECTOMY WITH SALPINGO OOPHORECTOMY      RODRICK w/ BSO due to bleeding problems   • TRIGGER FINGER RELEASE         Outpatient Medications Marked as Taking for the 11/4/21 encounter (Office Visit) with Crystal Sheldon APRN   Medication Sig Dispense Refill   • DULoxetine (CYMBALTA) 60 MG capsule Take 60 mg by mouth Daily.     • ezetimibe (ZETIA) 10 MG tablet Take 1 tablet by mouth Daily. 30 tablet 5   • pantoprazole (PROTONIX) 40 MG EC tablet Take 1 tablet by mouth Daily. 30 tablet 11   • Pentasa 500 MG CR capsule Take 2 capsules by mouth 2 (Two) Times a Day. 120 capsule 11       Allergies   Allergen Reactions   • Wasp Venom Itching       Social History     Socioeconomic History   • Marital status:    Tobacco Use   • Smoking status: Current Every Day Smoker     Packs/day: 1.00   • Smokeless tobacco: Never Used   Substance and Sexual Activity   • Alcohol use: Yes     Comment: rare   • Drug use: No   • Sexual activity: Yes     Birth control/protection: Surgical       Family History   Problem Relation Age of Onset   • Hypertension Father    • Heart disease Paternal Grandmother    • Colon cancer Mother         at age 48   • Colon polyps Sister         precancerous polyp requring surgery at age 43.    • Breast cancer Neg Hx    • Ovarian cancer Neg Hx        Review of Systems   Constitutional: Negative for chills, fever and unexpected weight change.   Respiratory: Negative for shortness of breath and wheezing.    Cardiovascular: Negative for chest pain and palpitations.   Gastrointestinal: Positive for nausea and vomiting. Negative for abdominal distention, abdominal pain, anal bleeding, blood in stool, constipation and diarrhea.        Vitals:    11/04/21 0855   BP: 132/74   Pulse: 80   Temp: 96.9 °F (36.1 °C)   SpO2: 99%   Weight: 74.4 kg (164 lb)   Height: 167.6 cm (66\")      Body mass index is 26.47 kg/m².    Physical Exam  Vitals reviewed.   Constitutional:       General: She is not in acute " distress.  Cardiovascular:      Rate and Rhythm: Normal rate and regular rhythm.      Heart sounds: Normal heart sounds.   Pulmonary:      Effort: Pulmonary effort is normal.      Breath sounds: Normal breath sounds.   Abdominal:      General: Bowel sounds are normal. There is no distension.      Palpations: Abdomen is soft.      Tenderness: There is no abdominal tenderness.   Skin:     General: Skin is warm and dry.   Neurological:      Mental Status: She is alert.         Results for orders placed or performed in visit on 06/08/20   Lipid panel    Specimen: Blood   Result Value Ref Range    Total Cholesterol 151 0 - 200 mg/dL    Triglycerides 141 0 - 150 mg/dL    HDL Cholesterol 34 (L) 40 - 60 mg/dL    LDL Cholesterol  89 0 - 100 mg/dL    VLDL Cholesterol 28.2 5 - 40 mg/dL    LDL/HDL Ratio 2.61    Comprehensive metabolic panel    Specimen: Blood   Result Value Ref Range    Glucose 94 65 - 99 mg/dL    BUN 15 6 - 20 mg/dL    Creatinine 0.85 0.57 - 1.00 mg/dL    Sodium 140 136 - 145 mmol/L    Potassium 3.9 3.5 - 5.2 mmol/L    Chloride 107 98 - 107 mmol/L    CO2 23.6 22.0 - 29.0 mmol/L    Calcium 8.9 8.6 - 10.5 mg/dL    Total Protein 6.6 6.0 - 8.5 g/dL    Albumin 4.10 3.50 - 5.20 g/dL    ALT (SGPT) 14 1 - 33 U/L    AST (SGOT) 12 1 - 32 U/L    Alkaline Phosphatase 121 (H) 39 - 117 U/L    Total Bilirubin <0.2 (L) 0.2 - 1.2 mg/dL    eGFR Non African Amer 70 >60 mL/min/1.73    Globulin 2.5 gm/dL    A/G Ratio 1.6 g/dL    BUN/Creatinine Ratio 17.6 7.0 - 25.0    Anion Gap 9.4 5.0 - 15.0 mmol/L   CBC Auto Differential    Specimen: Blood   Result Value Ref Range    WBC 9.75 3.40 - 10.80 10*3/mm3    RBC 4.57 3.77 - 5.28 10*6/mm3    Hemoglobin 12.6 12.0 - 15.9 g/dL    Hematocrit 38.5 34.0 - 46.6 %    MCV 84.2 79.0 - 97.0 fL    MCH 27.6 26.6 - 33.0 pg    MCHC 32.7 31.5 - 35.7 g/dL    RDW 13.9 12.3 - 15.4 %    RDW-SD 42.4 37.0 - 54.0 fl    MPV 13.0 (H) 6.0 - 12.0 fL    Platelets 262 140 - 450 10*3/mm3    Neutrophil % 72.2 42.7 -  76.0 %    Lymphocyte % 20.8 19.6 - 45.3 %    Monocyte % 4.9 (L) 5.0 - 12.0 %    Eosinophil % 0.9 0.3 - 6.2 %    Basophil % 0.6 0.0 - 1.5 %    Immature Grans % 0.6 (H) 0.0 - 0.5 %    Neutrophils, Absolute 7.03 (H) 1.70 - 7.00 10*3/mm3    Lymphocytes, Absolute 2.03 0.70 - 3.10 10*3/mm3    Monocytes, Absolute 0.48 0.10 - 0.90 10*3/mm3    Eosinophils, Absolute 0.09 0.00 - 0.40 10*3/mm3    Basophils, Absolute 0.06 0.00 - 0.20 10*3/mm3    Immature Grans, Absolute 0.06 (H) 0.00 - 0.05 10*3/mm3    nRBC 0.0 0.0 - 0.2 /100 WBC           ASSESSMENT AND PLAN    Assessment/Plan     Diagnoses and all orders for this visit:    1. Nausea and vomiting, intractability of vomiting not specified, unspecified vomiting type (Primary)  -     Case Request; Standing    Other orders  -     Follow Anesthesia Guidelines / Protocol; Future  -     Obtain Informed Consent; Future        N/V of uncertain etiology.  Recommend egd for further evaluation and she is agreeable. Recommend continue protonix daily.      In regards to Crohn's , she has no complaints of abdominal pain or bowel habit issues. Recommend continue pentasa daily.         ESOPHAGOGASTRODUODENOSCOPY WITH ANESTHESIA (N/A)  Risk, benefits, and alternatives of endoscopy were explained in full.  They understand that there is a risk of bleeding, perforation, and infection.  The risk of perforation goes up with esophageal dilation.  Other options to evaluate UGI complaints could involve barium swallow or UGI series, but these would be diagnostic tests only.  Patient was given time to ask questions.  I answered them to their satisfaction and they are agreeable to proceeding                 Crystal Sheldon, APRN

## 2021-11-15 ENCOUNTER — TELEPHONE (OUTPATIENT)
Dept: GASTROENTEROLOGY | Facility: CLINIC | Age: 56
End: 2021-11-15

## 2021-12-13 ENCOUNTER — ANESTHESIA EVENT (OUTPATIENT)
Dept: GASTROENTEROLOGY | Facility: HOSPITAL | Age: 56
End: 2021-12-13

## 2021-12-13 ENCOUNTER — ANESTHESIA (OUTPATIENT)
Dept: GASTROENTEROLOGY | Facility: HOSPITAL | Age: 56
End: 2021-12-13

## 2021-12-13 ENCOUNTER — HOSPITAL ENCOUNTER (OUTPATIENT)
Facility: HOSPITAL | Age: 56
Setting detail: HOSPITAL OUTPATIENT SURGERY
Discharge: HOME | End: 2021-12-13
Attending: INTERNAL MEDICINE | Admitting: INTERNAL MEDICINE

## 2021-12-13 VITALS
TEMPERATURE: 97.1 F | WEIGHT: 167 LBS | HEART RATE: 84 BPM | OXYGEN SATURATION: 99 % | SYSTOLIC BLOOD PRESSURE: 114 MMHG | BODY MASS INDEX: 26.84 KG/M2 | HEIGHT: 66 IN | RESPIRATION RATE: 17 BRPM | DIASTOLIC BLOOD PRESSURE: 73 MMHG

## 2021-12-13 DIAGNOSIS — K50.019 CROHN'S DISEASE OF SMALL INTESTINE WITH COMPLICATION (HCC): Primary | ICD-10-CM

## 2021-12-13 PROCEDURE — 25010000002 PROPOFOL 10 MG/ML EMULSION: Performed by: NURSE ANESTHETIST, CERTIFIED REGISTERED

## 2021-12-13 PROCEDURE — 43235 EGD DIAGNOSTIC BRUSH WASH: CPT | Performed by: INTERNAL MEDICINE

## 2021-12-13 RX ORDER — SODIUM CHLORIDE 9 MG/ML
500 INJECTION, SOLUTION INTRAVENOUS CONTINUOUS PRN
Status: DISCONTINUED | OUTPATIENT
Start: 2021-12-13 | End: 2021-12-13 | Stop reason: HOSPADM

## 2021-12-13 RX ORDER — ONDANSETRON 2 MG/ML
4 INJECTION INTRAMUSCULAR; INTRAVENOUS ONCE AS NEEDED
Status: DISCONTINUED | OUTPATIENT
Start: 2021-12-13 | End: 2021-12-13 | Stop reason: HOSPADM

## 2021-12-13 RX ORDER — SODIUM CHLORIDE 0.9 % (FLUSH) 0.9 %
10 SYRINGE (ML) INJECTION AS NEEDED
Status: DISCONTINUED | OUTPATIENT
Start: 2021-12-13 | End: 2021-12-13 | Stop reason: HOSPADM

## 2021-12-13 RX ORDER — PROPOFOL 10 MG/ML
VIAL (ML) INTRAVENOUS AS NEEDED
Status: DISCONTINUED | OUTPATIENT
Start: 2021-12-13 | End: 2021-12-13 | Stop reason: SURG

## 2021-12-13 RX ORDER — LIDOCAINE HYDROCHLORIDE 20 MG/ML
INJECTION, SOLUTION EPIDURAL; INFILTRATION; INTRACAUDAL; PERINEURAL AS NEEDED
Status: DISCONTINUED | OUTPATIENT
Start: 2021-12-13 | End: 2021-12-13 | Stop reason: SURG

## 2021-12-13 RX ORDER — LIDOCAINE HYDROCHLORIDE 10 MG/ML
0.5 INJECTION, SOLUTION EPIDURAL; INFILTRATION; INTRACAUDAL; PERINEURAL ONCE AS NEEDED
Status: DISCONTINUED | OUTPATIENT
Start: 2021-12-13 | End: 2021-12-13 | Stop reason: HOSPADM

## 2021-12-13 RX ADMIN — PROPOFOL 60 MG: 10 INJECTION, EMULSION INTRAVENOUS at 12:07

## 2021-12-13 RX ADMIN — PROPOFOL 60 MG: 10 INJECTION, EMULSION INTRAVENOUS at 12:06

## 2021-12-13 RX ADMIN — LIDOCAINE HYDROCHLORIDE 200 MG: 20 INJECTION, SOLUTION EPIDURAL; INFILTRATION; INTRACAUDAL; PERINEURAL at 12:06

## 2021-12-13 RX ADMIN — PROPOFOL 80 MG: 10 INJECTION, EMULSION INTRAVENOUS at 12:08

## 2021-12-13 RX ADMIN — SODIUM CHLORIDE 500 ML: 9 INJECTION, SOLUTION INTRAVENOUS at 11:55

## 2021-12-13 NOTE — ANESTHESIA POSTPROCEDURE EVALUATION
"Patient: Maria Victoria Aranda    Procedure Summary     Date: 12/13/21 Room / Location: [facility] ENDOSCOPY 2 / [facility] ENDOSCOPY    Anesthesia Start: 1201 Anesthesia Stop: 1218    Procedure: ESOPHAGOGASTRODUODENOSCOPY WITH ANESTHESIA (N/A ) Diagnosis:       Nausea and vomiting, intractability of vomiting not specified, unspecified vomiting type      (Nausea and vomiting, intractability of vomiting not specified, unspecified vomiting type [R11.2])    Surgeons: Sreedhar Ball MD Provider: Chapin Torres CRNA    Anesthesia Type: MAC ASA Status: 2          Anesthesia Type: MAC    Vitals  No vitals data found for the desired time range.          Post Anesthesia Care and Evaluation    Patient location during evaluation: PHASE II  Patient participation: complete - patient participated  Level of consciousness: awake  Pain score: 0  Pain management: adequate  Airway patency: patent  Anesthetic complications: No anesthetic complications  PONV Status: none  Cardiovascular status: acceptable  Respiratory status: acceptable, airway suctioned and nasal cannula  Hydration status: acceptable    Comments: Blood pressure 129/76, pulse 85, temperature 97.1 °F (36.2 °C), temperature source Temporal, resp. rate 20, height 167.6 cm (66\"), weight 75.8 kg (167 lb), SpO2 99 %, not currently breastfeeding.        "

## 2021-12-13 NOTE — ANESTHESIA PREPROCEDURE EVALUATION
Anesthesia Evaluation     history of anesthetic complications: PONV  NPO Solid Status: > 8 hours  NPO Liquid Status: > 8 hours           Airway   Mallampati: I  TM distance: >3 FB  Neck ROM: full  No difficulty expected  Dental      Pulmonary    (+) a smoker Current,   Cardiovascular   Exercise tolerance: good (4-7 METS)    (-) hypertension    ROS comment: Low risk stress test 2019    Neuro/Psych  (-) seizures, TIA, CVA  GI/Hepatic/Renal/Endo    (+)  GERD,    (-) liver disease, no renal disease, diabetes    ROS Comment: crohns disease     Musculoskeletal     Abdominal    Substance History      OB/GYN          Other                        Anesthesia Plan    ASA 2     MAC     intravenous induction     Anesthetic plan, all risks, benefits, and alternatives have been provided, discussed and informed consent has been obtained with: patient.

## 2021-12-13 NOTE — H&P
[facility] Gastroenterology  Pre Procedure History & Physical    Chief Complaint:   Nausea vomiting    Subjective     HPI:   She was having intermittent nausea vomiting.  She states she has some bloating with it.  Denies any diarrhea or abdominal pain.  Since she was in the office on November 4 her symptoms have improved.  She still has occasional nausea.  She presents for endoscopy    Past Medical History:   Past Medical History:   Diagnosis Date   • Anxiety    • Esophagitis determined by endoscopy    • PONV (postoperative nausea and vomiting)    • Trigger finger        Past Surgical History:  Past Surgical History:   Procedure Laterality Date   • APPENDECTOMY     • CARPAL TUNNEL RELEASE     • CHOLECYSTECTOMY     • COLONOSCOPY     • COLONOSCOPY N/A 5/1/2019    Procedure: COLONOSCOPY WITH ANESTHESIA;  Surgeon: Sreedhar Ball MD;  Location: [facility] ENDOSCOPY;  Service: Gastroenterology   • CYST REMOVAL     • CYST REMOVAL      hand   • ENDOSCOPY  03/2018   • ENDOSCOPY N/A 5/1/2019    Procedure: ESOPHAGOGASTRODUODENOSCOPY WITH ANESTHESIA;  Surgeon: Sreedhar Ball MD;  Location: [facility] ENDOSCOPY;  Service: Gastroenterology   • HAND TENDON SURGERY     • OOPHORECTOMY     • SALPINGECTOMY     • TONSILLECTOMY     • TOTAL ABDOMINAL HYSTERECTOMY WITH SALPINGO OOPHORECTOMY      RODRICK w/ BSO due to bleeding problems   • TRIGGER FINGER RELEASE          Family History:  Family History   Problem Relation Age of Onset   • Hypertension Father    • Heart disease Paternal Grandmother    • Colon cancer Mother         at age 48   • Colon polyps Sister         precancerous polyp requring surgery at age 43.    • Breast cancer Neg Hx    • Ovarian cancer Neg Hx        Social History:   reports that she has been smoking. She has been smoking about 1.00 pack per day. She has never used smokeless tobacco. She reports current alcohol use. She reports that she does not use drugs.    Medications:   Prior to Admission medications   "  Medication Sig Start Date End Date Taking? Authorizing Provider   DULoxetine (CYMBALTA) 60 MG capsule Take 60 mg by mouth Daily.   Yes Tyson Beard MD   ezetimibe (ZETIA) 10 MG tablet Take 1 tablet by mouth Daily. 12/13/19  Yes JANN Hugo MD   pantoprazole (PROTONIX) 40 MG EC tablet Take 1 tablet by mouth Daily. 5/6/21  Yes Sreedhar Ball MD   Pentasa 500 MG CR capsule Take 2 capsules by mouth 2 (Two) Times a Day. 5/6/21 5/6/22 Yes Sreedhar Ball MD   HYDROcodone-acetaminophen (NORCO) 5-325 MG per tablet Take 1 tablet by mouth As Needed. 3/10/20   ProviderTyson MD       Allergies:  Wasp venom    ROS:    General: Weight stable  Resp: No SOA  Cardiovascular: No CP    Objective     Blood pressure 129/76, pulse 85, temperature 97.1 °F (36.2 °C), temperature source Temporal, resp. rate 20, height 167.6 cm (66\"), weight 75.8 kg (167 lb), SpO2 99 %, not currently breastfeeding.    Physical Exam   Constitutional: Pt is oriented to person, place, and in no distress.   Cardiovascular: Normal rate, regular rhythm.    Pulmonary/Chest: Effort normal. No respiratory distress.    Abdominal: Non-distended.  Psychiatric: Mood, memory, affect and judgment appear normal.     Assessment/Plan     Diagnosis:  Nausea vomiting    Anticipated Surgical Procedure:  Endoscopy    The risks, benefits, and alternatives of this procedure have been discussed with the patient or the responsible party- the patient understands and agrees to proceed.    EMR Dragon/transcription disclaimer:  Much of this encounter note is electronic transcription/translation of spoken language to printed text.  The electronic translation of spoken language may be erroneous, or at times, nonsensical words or phrases may be inadvertently transcribed.  Although I have reviewed the note for such errors, some may still exist.  "

## 2021-12-13 NOTE — NURSING NOTE
Patient did not have paper copy of covid result, she did have a copy of results on phone. Preop nurse KAIT luis rn verified results. Told supervisor SABRINA James rn about the situation and she gave work email for patient to send results to so they can be printed off and put in chart.

## 2021-12-16 ENCOUNTER — TRANSCRIBE ORDERS (OUTPATIENT)
Dept: ADMINISTRATIVE | Facility: HOSPITAL | Age: 56
End: 2021-12-16

## 2021-12-16 DIAGNOSIS — Z12.31 ENCOUNTER FOR SCREENING MAMMOGRAM FOR MALIGNANT NEOPLASM OF BREAST: Primary | ICD-10-CM

## 2021-12-16 DIAGNOSIS — R10.31 RIGHT LOWER QUADRANT PAIN: ICD-10-CM

## 2021-12-16 DIAGNOSIS — R11.0 NAUSEA: ICD-10-CM

## 2021-12-22 ENCOUNTER — HOSPITAL ENCOUNTER (OUTPATIENT)
Dept: CT IMAGING | Facility: HOSPITAL | Age: 56
Discharge: HOME | End: 2021-12-22
Admitting: INTERNAL MEDICINE

## 2021-12-22 PROCEDURE — 74160 CT ABDOMEN W/CONTRAST: CPT

## 2021-12-22 PROCEDURE — 0 IOPAMIDOL PER 1 ML: Performed by: INTERNAL MEDICINE

## 2021-12-22 RX ADMIN — IOPAMIDOL 100 ML: 755 INJECTION, SOLUTION INTRAVENOUS at 09:30

## 2022-05-09 ENCOUNTER — OFFICE VISIT (OUTPATIENT)
Dept: GASTROENTEROLOGY | Facility: CLINIC | Age: 57
End: 2022-05-09

## 2022-05-09 VITALS
WEIGHT: 153 LBS | HEART RATE: 82 BPM | TEMPERATURE: 96.8 F | DIASTOLIC BLOOD PRESSURE: 76 MMHG | SYSTOLIC BLOOD PRESSURE: 120 MMHG | BODY MASS INDEX: 24.59 KG/M2 | HEIGHT: 66 IN | OXYGEN SATURATION: 98 %

## 2022-05-09 DIAGNOSIS — K21.9 GASTROESOPHAGEAL REFLUX DISEASE WITHOUT ESOPHAGITIS: ICD-10-CM

## 2022-05-09 DIAGNOSIS — K50.019 CROHN'S DISEASE OF SMALL INTESTINE WITH COMPLICATION: Primary | ICD-10-CM

## 2022-05-09 PROBLEM — R11.2 NAUSEA AND VOMITING: Status: RESOLVED | Noted: 2021-11-04 | Resolved: 2022-05-09

## 2022-05-09 PROCEDURE — 99213 OFFICE O/P EST LOW 20 MIN: CPT | Performed by: INTERNAL MEDICINE

## 2022-05-09 RX ORDER — POLYETHYLENE GLYCOL 3350, SODIUM CHLORIDE, SODIUM BICARBONATE, POTASSIUM CHLORIDE 420; 11.2; 5.72; 1.48 G/4L; G/4L; G/4L; G/4L
4000 POWDER, FOR SOLUTION ORAL SEE ADMIN INSTRUCTIONS
Qty: 4000 ML | Refills: 0 | Status: SHIPPED | OUTPATIENT
Start: 2022-05-09 | End: 2023-03-01

## 2022-05-09 RX ORDER — PANTOPRAZOLE SODIUM 40 MG/1
40 TABLET, DELAYED RELEASE ORAL DAILY
Qty: 30 TABLET | Refills: 11 | Status: SHIPPED | OUTPATIENT
Start: 2022-05-09

## 2022-05-09 NOTE — PROGRESS NOTES
"[facility]    Chief Complaint   Patient presents with   • Crohn's Disease   • Colonoscopy       Subjective     HPI    Maria Victoria Aranda is a 57 y.o. female who presents with a chief complaint of Crohn's.    She comes in for her annual visit.  She was here in November and had some nausea.  Upper endoscopy was unremarkable we did check CT enterography which was unremarkable.  She tells me she is doing well at this point.  Denies any abdominal pain.  No nausea.  She does not have any heartburn.  She states she had a little bit of abdominal discomfort 2 weeks ago but at that time her father just passed away she states it was an emotional couple of days.  That has subsided.    She continues on Pentasa 2 tablets twice daily.  She used to be on higher doses of Pentasa.  She is not have any troubles with her Crohn's.  She is never been on more advanced medication better than the Pentasa.  It has been 3 years since her last colonoscopy.    Regarding her heartburn this is under control.  She states she has not really had heartburn in quite a long time.  She continues on Protonix daily.  No breakthrough symptoms.  =============== copy of November 2021 HPI===============================    HISTORY OF PRESENT ILLNESS:      Maria Victoria Aranda is a 56 y.o. female presents with nausea/vomiting for 2 months. She can have nausea with the vomiting. No triggers. Last episode was 2 weeks ago. Emesis is yellow/clear. Takes protonix daily.   No change in appetite. Has had some increased stress \" nothing major.   No new medications.  No fever or chills. No weight loss. No abdominal pain. No headaches.            No recent labs or imaging of the abdomen.            She has crohn's disease. Taking pentasa 2 twice daily. Has 1 bm /day. No constipated.  No rectal bleeding.         Last colonoscopy 5/2019  - Crohn's disease with ileitis. Biopsied.  - Internal hemorrhoids.  - The examination was otherwise normal on direct and " retroflexion views.  - Background biopsies were taken from the cecum and rectum.        Final Diagnosis   1.  Gastroesophageal junction, biopsy:       Moderate chronic esophagogastritis.       No intestinal metaplasia.     2.  Terminal ileum, biopsy:       No evidence of active ileitis.       No adenomatous or dysplastic changes.     3.  Cecum, biopsy:       No evidence of active colitis.       No adenomatous or dysplastic changes.     4.  Rectum, biopsy:       No active colitis.       No adenomatous or dysplastic changes.               Last egd 5/2019   - Z-line variable, at the gastroesophageal junction. Biopsied.  - Normal stomach.  - Normal examined duodenum.        ====================================================  Ov 5-6-21 HPI  Maria Victoria Aranda is a 56 y.o. female who presents with a chief complaint of Crohn's.     She tells me she is doing well.  Denies abdominal pain.  She states she occasionally has loose stools.  Back in winter she had a loose stool maybe once a week.  Now she maybe has loose stool once a month.  Sometimes it is related to what she eats.  She never passed any blood or mucus.  She denies any abdominal cramping.  Her weights been stable.  She is not having significant heartburn.  Denies dysphagia.  She states she is at her baseline.  She has no complaints.  She tells me she just recently had lab work done by her PCP.        ============== May 2020 HPI= 9==========================================  HPI     Maria Victoria Aranda is a 55 y.o. female who presents with a chief complaint of Crohn's     Since she was here last year in April, she underwent endoscopy and colonoscopy in May 2019.  Colonoscopy revealed 1 small aphthous ulcer in the terminal ileum.  She was advised to quit smoking, avoid NSAIDs and continue Pentasa.  Upper endoscopy was unremarkable.  Biopsies of the GE junction were unremarkable.     She tells me she is asymptomatic.  She wants a while have some loose stools.  Usually occurs if  she eats the wrong things.  Heartburn is under relatively good control.  She will have some discomfort and she points to her left shoulder at times.  She thinks is more of arthralgias in her shoulder and in her muscles of her chest.  She does not think she is getting that much reflux.  She denies any blood in her stools.  She is not losing weight of anything she is gained weight she states.  She has no lower abdominal cramps or discomfort.  She continues on Pentasa twice a day.  She also continues on her reflux medications.  She does miss a dose of her PPI then she has breakthrough symptoms.     =============== April 2019 HPI=====================     HISTORY OF PRESENT ILLNESS     Maria Victoria Aranda is a 54 y.o. female presents  with reflux.  Symptoms are mainly of a burning sensation of the chest as well as an aching sensation.  She has had trouble with this intermittently over the last year.  She has been on Dexilant over the last year which does not seem to control her symptoms.  She has tried Prevacid with no help.  Was on Protonix several years ago which did control her symptoms but her insurance would not pay for it.  Certain foods that can trigger spicy and greasy foods.  She does smoke.  States that she is trying to quit.  She is decreasing her caffeine intake as well.    She denies any exertional pain.  No dysphagia.  No nausea vomiting.  No abdominal pain.  No unintentional weight loss.     Has had EGD in the past by Dr. Parada there was question of Drake's in the past per patient history.  Her last upper endoscopy by Dr. Parada was last year.     States that she was diagnosed with Crohn's disease 20 years ago.  Has been followed by Dr. Parada.  Has taken Pentasa over the last 20 years. Has never taken any other meds or steroids for crohns.   She does move her bowels daily.  No rectal bleeding.  No weight loss.  No nausea vomiting.  No abdominal pain.  No skin rash.  No joint pain.  No ocular pain. No  history of abdominal surgeries for Crohn's.     She denies personal history of colon polyps or colon cancer.  She has a sister had a precancerous polyp requiring surgery at age 43.  Her mother had colon cancer at age 48           Past Medical History:   Diagnosis Date   • Anxiety    • Esophagitis determined by endoscopy    • PONV (postoperative nausea and vomiting)    • Trigger finger        Past Surgical History:   Procedure Laterality Date   • APPENDECTOMY     • CARPAL TUNNEL RELEASE     • CHOLECYSTECTOMY     • COLONOSCOPY     • COLONOSCOPY N/A 05/01/2019    Procedure: COLONOSCOPY WITH ANESTHESIA;  Surgeon: Sreedhar Ball MD;  Location: [facility] ENDOSCOPY;  Service: Gastroenterology   • CYST REMOVAL     • CYST REMOVAL      hand   • ENDOSCOPY  03/2018   • ENDOSCOPY N/A 05/01/2019    Procedure: ESOPHAGOGASTRODUODENOSCOPY WITH ANESTHESIA;  Surgeon: Sreedhar Ball MD;  Location: [facility] ENDOSCOPY;  Service: Gastroenterology   • ENDOSCOPY N/A 12/13/2021    Procedure: ESOPHAGOGASTRODUODENOSCOPY WITH ANESTHESIA;  Surgeon: Sreedhar Ball MD;  Location: [facility] ENDOSCOPY;  Service: Gastroenterology;  Laterality: N/A;  pre op: nausea, vomiting  post op:normal  PCP: Paolo Murcia MD   • HAND TENDON SURGERY     • OOPHORECTOMY     • SALPINGECTOMY     • TONSILLECTOMY     • TOTAL ABDOMINAL HYSTERECTOMY WITH SALPINGO OOPHORECTOMY      RODRICK w/ BSO due to bleeding problems   • TRIGGER FINGER RELEASE      x 2         Current Outpatient Medications:   •  DULoxetine (CYMBALTA) 60 MG capsule, Take 60 mg by mouth Daily., Disp: , Rfl:   •  ezetimibe (ZETIA) 10 MG tablet, Take 1 tablet by mouth Daily., Disp: 30 tablet, Rfl: 5  •  mesalamine (PENTASA) 250 MG CR capsule, Take 4 capsules by mouth 2 (Two) Times a Day., Disp: 120 capsule, Rfl: 11  •  pantoprazole (PROTONIX) 40 MG EC tablet, Take 1 tablet by mouth Daily., Disp: 30 tablet, Rfl: 11  •  HYDROcodone-acetaminophen (NORCO) 5-325 MG per tablet, Take 1 tablet by mouth As  Needed., Disp: , Rfl:   •  polyethylene glycol-electrolytes (Nulytely with Flavor Packs) 420 g solution, Take 4,000 mL by mouth See Admin Instructions., Disp: 4000 mL, Rfl: 0    Allergies   Allergen Reactions   • Wasp Venom Itching       Social History     Socioeconomic History   • Marital status:    Tobacco Use   • Smoking status: Current Every Day Smoker     Packs/day: 1.00   • Smokeless tobacco: Never Used   Vaping Use   • Vaping Use: Never used   Substance and Sexual Activity   • Alcohol use: Yes     Comment: rare   • Drug use: No   • Sexual activity: Yes     Birth control/protection: Surgical       Family History   Problem Relation Age of Onset   • Hypertension Father    • Heart disease Paternal Grandmother    • Colon cancer Mother         at age 48   • Colon polyps Sister         precancerous polyp requring surgery at age 43.    • Breast cancer Neg Hx    • Ovarian cancer Neg Hx        Review of Systems  Denies constipation diarrhea, no blood in her stools    Objective     Vitals:    05/09/22 1437   BP: 120/76   Pulse: 82   Temp: 96.8 °F (36 °C)   SpO2: 98%       Physical Exam  No acute distress. Vital signs as documented.  Sclera anicteric.  Neck without noticeable JVD. Lungs clear to auscultation. Heart exam notable for regular rhythm, normal sounds. Abdomen is soft, nontender, non distended, normal bowel sounds and without evidence of organomegaly, masses.  Neuro alert, moves extremities.        Assessment/Plan   Problem List Items Addressed This Visit        Gastrointestinal Abdominal     Gastroesophageal reflux disease    Relevant Medications    pantoprazole (PROTONIX) 40 MG EC tablet    Crohn's disease with complication (HCC) - Primary    Overview     Originally diagnosed by Dr. Parada before the year 2000.  Treated with Pentasa.  Never had surgery or immunosuppressive therapy.  Colonoscopy May 2019 revealed 1 small aphthous ulcer in the TI  CT enterography December 2021, no active Crohn's  noted           Relevant Orders    Case Request (Completed)            First, regarding her reflux disease she is asymptomatic.  She continues on Protonix and I refilled her prescription.  I did advise for her to see if she can wean off.  I suggest that she missed 1 day a week.  She does okay with that then the next week missed 2 days that week, but not back to back.  She can try to wean down to every other day and if she continues to do well then continue to wean off.  If she starts having return of reflux symptoms then she can get back on the Protonix.  In the interim, she should maintain reflux precautions.  She is willing to give this a try.    Regards to her Crohn's she is asymptomatic.  She has had minimal disease.  It has been 3 years since her last colonoscopy exam so I suggest surveillance colonoscopy.  She has had a Crohn's for over 20 years.  She had negative CT enterography this past December.  She is willing to pursue colonoscopy will plan GoLytely prep.  She has been maintained on Pentasa for many many years.  With no active disease other than 1 tiny aphthous ulcer on colonoscopy in 2019, I think it is reasonable to maintain this therapy but advance with any sign or symptom of advanced disease.    We will see her back in the office in 1 year as well.    Continue ongoing management by primary care provider and other specialists.     Body mass index is 24.69 kg/m².  Stable BMI      EMR Dragon/transcription disclaimer:  Much of this encounter note is electronic transcription/translation of spoken language to printed text.  The electronic translation of spoken language may be erroneous, or at times, nonsensical words or phrases may be inadvertently transcribed.  Although I have reviewed the note for such errors, some may still exist.    Sreedhar Ball MD  16:18 CDT  05/09/22

## 2022-05-24 ENCOUNTER — TELEPHONE (OUTPATIENT)
Dept: GASTROENTEROLOGY | Facility: CLINIC | Age: 57
End: 2022-05-24

## 2022-05-24 NOTE — TELEPHONE ENCOUNTER
PT called and rescheduled to 5-31-22.  PT still wasn't feeling good.      PT said she has had the stomach bug (vomiting, diarrhea) .  She is feeling better.  She is scheduled for a colon on Thursday.    PT was wondering if she needed to reschedule?

## 2022-05-26 NOTE — TELEPHONE ENCOUNTER
So per your message, she rescheduled to Tuesday and you are asking if she needs to reschedule from this Thursday?  Please clarify

## 2022-05-31 ENCOUNTER — ANESTHESIA (OUTPATIENT)
Dept: GASTROENTEROLOGY | Facility: HOSPITAL | Age: 57
End: 2022-05-31

## 2022-05-31 ENCOUNTER — HOSPITAL ENCOUNTER (OUTPATIENT)
Facility: HOSPITAL | Age: 57
Setting detail: HOSPITAL OUTPATIENT SURGERY
Discharge: HOME | End: 2022-05-31
Attending: INTERNAL MEDICINE | Admitting: INTERNAL MEDICINE

## 2022-05-31 ENCOUNTER — ANESTHESIA EVENT (OUTPATIENT)
Dept: GASTROENTEROLOGY | Facility: HOSPITAL | Age: 57
End: 2022-05-31

## 2022-05-31 VITALS
HEIGHT: 66 IN | HEART RATE: 77 BPM | BODY MASS INDEX: 24.11 KG/M2 | DIASTOLIC BLOOD PRESSURE: 79 MMHG | RESPIRATION RATE: 11 BRPM | SYSTOLIC BLOOD PRESSURE: 124 MMHG | OXYGEN SATURATION: 100 % | TEMPERATURE: 98.1 F | WEIGHT: 150 LBS

## 2022-05-31 DIAGNOSIS — K50.019 CROHN'S DISEASE OF SMALL INTESTINE WITH COMPLICATION: ICD-10-CM

## 2022-05-31 PROCEDURE — 25010000002 PROPOFOL 10 MG/ML EMULSION

## 2022-05-31 PROCEDURE — 45380 COLONOSCOPY AND BIOPSY: CPT | Performed by: INTERNAL MEDICINE

## 2022-05-31 PROCEDURE — 88305 TISSUE EXAM BY PATHOLOGIST: CPT | Performed by: INTERNAL MEDICINE

## 2022-05-31 RX ORDER — LIDOCAINE HYDROCHLORIDE 20 MG/ML
INJECTION, SOLUTION EPIDURAL; INFILTRATION; INTRACAUDAL; PERINEURAL AS NEEDED
Status: DISCONTINUED | OUTPATIENT
Start: 2022-05-31 | End: 2022-05-31 | Stop reason: SURG

## 2022-05-31 RX ORDER — SODIUM CHLORIDE 9 MG/ML
500 INJECTION, SOLUTION INTRAVENOUS CONTINUOUS PRN
Status: DISCONTINUED | OUTPATIENT
Start: 2022-05-31 | End: 2022-05-31 | Stop reason: HOSPADM

## 2022-05-31 RX ORDER — PROPOFOL 10 MG/ML
VIAL (ML) INTRAVENOUS AS NEEDED
Status: DISCONTINUED | OUTPATIENT
Start: 2022-05-31 | End: 2022-05-31 | Stop reason: SURG

## 2022-05-31 RX ORDER — ONDANSETRON 2 MG/ML
4 INJECTION INTRAMUSCULAR; INTRAVENOUS ONCE AS NEEDED
Status: DISCONTINUED | OUTPATIENT
Start: 2022-05-31 | End: 2022-05-31 | Stop reason: HOSPADM

## 2022-05-31 RX ORDER — SODIUM CHLORIDE 0.9 % (FLUSH) 0.9 %
10 SYRINGE (ML) INJECTION AS NEEDED
Status: CANCELLED | OUTPATIENT
Start: 2022-05-31

## 2022-05-31 RX ORDER — SODIUM CHLORIDE 0.9 % (FLUSH) 0.9 %
10 SYRINGE (ML) INJECTION AS NEEDED
Status: DISCONTINUED | OUTPATIENT
Start: 2022-05-31 | End: 2022-05-31 | Stop reason: HOSPADM

## 2022-05-31 RX ORDER — SODIUM CHLORIDE 9 MG/ML
100 INJECTION, SOLUTION INTRAVENOUS CONTINUOUS
Status: CANCELLED | OUTPATIENT
Start: 2022-05-31

## 2022-05-31 RX ORDER — SODIUM CHLORIDE 0.9 % (FLUSH) 0.9 %
10 SYRINGE (ML) INJECTION EVERY 12 HOURS SCHEDULED
Status: CANCELLED | OUTPATIENT
Start: 2022-05-31

## 2022-05-31 RX ADMIN — PROPOFOL 340 MG: 10 INJECTION, EMULSION INTRAVENOUS at 10:34

## 2022-05-31 RX ADMIN — SODIUM CHLORIDE 500 ML: 9 INJECTION, SOLUTION INTRAVENOUS at 09:11

## 2022-05-31 RX ADMIN — LIDOCAINE HYDROCHLORIDE 80 MG: 20 INJECTION, SOLUTION EPIDURAL; INFILTRATION; INTRACAUDAL; PERINEURAL at 10:34

## 2022-05-31 NOTE — ANESTHESIA PREPROCEDURE EVALUATION
Anesthesia Evaluation     Patient summary reviewed   history of anesthetic complications: PONV  NPO Solid Status: > 8 hours  NPO Liquid Status: > 8 hours           Airway   Mallampati: I  TM distance: >3 FB  Neck ROM: full  No difficulty expected  Dental      Pulmonary    (+) a smoker Current,   Cardiovascular   Exercise tolerance: good (4-7 METS)    (-) hypertension    ROS comment: Low risk stress test 2019    Neuro/Psych  (-) seizures, TIA, CVA  GI/Hepatic/Renal/Endo    (+)  GERD,    (-) liver disease, no renal disease, diabetes    ROS Comment: crohns disease     Musculoskeletal     Abdominal    Substance History      OB/GYN          Other                          Anesthesia Plan    ASA 2     MAC     intravenous induction     Anesthetic plan, all risks, benefits, and alternatives have been provided, discussed and informed consent has been obtained with: patient and spouse/significant other.

## 2022-05-31 NOTE — ANESTHESIA POSTPROCEDURE EVALUATION
"Patient: Maria Victoria Aranda    Procedure Summary     Date: 05/31/22 Room / Location:  [facility] /  [facility]    Anesthesia Start: 1031 Anesthesia Stop: 1058    Procedure: COLONOSCOPY (N/A ) Diagnosis:       Crohn's disease of small intestine with complication (HCC)      (Crohn's disease of small intestine with complication (HCC) [K50.019])    Surgeons: Sreedhar Ball MD Provider: Kelvin Hastings CRNA    Anesthesia Type: MAC ASA Status: 2          Anesthesia Type: MAC    Vitals  Vitals Value Taken Time   /79 05/31/22 1116   Temp     Pulse 72 05/31/22 1120   Resp 11 05/31/22 1115   SpO2 100 % 05/31/22 1119   Vitals shown include unvalidated device data.        Post Anesthesia Care and Evaluation    Patient location during evaluation: PHASE II  Patient participation: complete - patient participated  Level of consciousness: awake  Pain management: adequate  Airway patency: patent  Anesthetic complications: No anesthetic complications    Cardiovascular status: acceptable  Respiratory status: acceptable  Hydration status: acceptable    Comments: /79   Pulse 77   Temp 98.1 °F (36.7 °C) (Temporal)   Resp 11   Ht 167.6 cm (66\")   Wt 68 kg (150 lb)   SpO2 100%   BMI 24.21 kg/m²         "

## 2022-06-01 LAB
CYTO UR: NORMAL
LAB AP CASE REPORT: NORMAL
Lab: NORMAL
PATH REPORT.FINAL DX SPEC: NORMAL
PATH REPORT.GROSS SPEC: NORMAL

## 2022-06-07 ENCOUNTER — TELEPHONE (OUTPATIENT)
Dept: GASTROENTEROLOGY | Facility: CLINIC | Age: 57
End: 2022-06-07

## 2022-06-08 RX ORDER — MESALAMINE 250 MG/1
500 CAPSULE ORAL 2 TIMES DAILY
COMMUNITY

## 2022-07-06 ENCOUNTER — TRANSCRIBE ORDERS (OUTPATIENT)
Dept: ADMINISTRATIVE | Facility: HOSPITAL | Age: 57
End: 2022-07-06

## 2022-07-06 DIAGNOSIS — Z12.31 ENCOUNTER FOR SCREENING MAMMOGRAM FOR MALIGNANT NEOPLASM OF BREAST: Primary | ICD-10-CM

## 2022-07-06 DIAGNOSIS — Z13.820 SCREENING FOR OSTEOPOROSIS: ICD-10-CM

## 2023-01-18 ENCOUNTER — TRANSCRIBE ORDERS (OUTPATIENT)
Dept: ADMINISTRATIVE | Facility: HOSPITAL | Age: 58
End: 2023-01-18
Payer: COMMERCIAL

## 2023-01-18 DIAGNOSIS — Z12.31 ENCOUNTER FOR SCREENING MAMMOGRAM FOR MALIGNANT NEOPLASM OF BREAST: Primary | ICD-10-CM

## 2023-01-23 ENCOUNTER — HOSPITAL ENCOUNTER (OUTPATIENT)
Dept: MAMMOGRAPHY | Facility: HOSPITAL | Age: 58
Discharge: HOME | End: 2023-01-23
Payer: COMMERCIAL

## 2023-01-23 ENCOUNTER — HOSPITAL ENCOUNTER (OUTPATIENT)
Dept: BONE DENSITY | Facility: HOSPITAL | Age: 58
Discharge: HOME | End: 2023-01-23
Payer: COMMERCIAL

## 2023-01-23 DIAGNOSIS — Z12.31 ENCOUNTER FOR SCREENING MAMMOGRAM FOR MALIGNANT NEOPLASM OF BREAST: ICD-10-CM

## 2023-01-23 DIAGNOSIS — Z13.820 SCREENING FOR OSTEOPOROSIS: ICD-10-CM

## 2023-01-23 PROCEDURE — 77067 SCR MAMMO BI INCL CAD: CPT

## 2023-01-23 PROCEDURE — 77063 BREAST TOMOSYNTHESIS BI: CPT

## 2023-01-23 PROCEDURE — 77080 DXA BONE DENSITY AXIAL: CPT

## 2023-01-24 ENCOUNTER — HOSPITAL ENCOUNTER (OUTPATIENT)
Dept: MAMMOGRAPHY | Facility: HOSPITAL | Age: 58
Discharge: HOME | End: 2023-01-24
Payer: COMMERCIAL

## 2023-01-24 ENCOUNTER — HOSPITAL ENCOUNTER (OUTPATIENT)
Dept: ULTRASOUND IMAGING | Facility: HOSPITAL | Age: 58
Discharge: HOME | End: 2023-01-24
Payer: COMMERCIAL

## 2023-01-24 DIAGNOSIS — R92.8 ABNORMAL MAMMOGRAM: ICD-10-CM

## 2023-01-24 PROCEDURE — G0279 TOMOSYNTHESIS, MAMMO: HCPCS

## 2023-01-24 PROCEDURE — 77065 DX MAMMO INCL CAD UNI: CPT

## 2023-03-01 ENCOUNTER — OFFICE VISIT (OUTPATIENT)
Dept: OBSTETRICS AND GYNECOLOGY | Facility: CLINIC | Age: 58
End: 2023-03-01
Payer: COMMERCIAL

## 2023-03-01 VITALS
SYSTOLIC BLOOD PRESSURE: 120 MMHG | BODY MASS INDEX: 24.11 KG/M2 | DIASTOLIC BLOOD PRESSURE: 70 MMHG | WEIGHT: 150 LBS | HEIGHT: 66 IN

## 2023-03-01 DIAGNOSIS — N89.8 VAGINAL IRRITATION: Primary | ICD-10-CM

## 2023-03-01 PROCEDURE — 99213 OFFICE O/P EST LOW 20 MIN: CPT | Performed by: NURSE PRACTITIONER

## 2023-03-01 RX ORDER — TRIAMCINOLONE ACETONIDE 5 MG/G
1 CREAM TOPICAL 2 TIMES DAILY
Qty: 30 G | Refills: 0 | Status: SHIPPED | OUTPATIENT
Start: 2023-03-01

## 2023-03-01 RX ORDER — ALENDRONATE SODIUM 70 MG/1
TABLET ORAL
COMMUNITY
Start: 2023-02-23

## 2023-03-01 RX ORDER — NYSTATIN 100000 U/G
1 OINTMENT TOPICAL 2 TIMES DAILY
Qty: 30 G | Refills: 0 | Status: SHIPPED | OUTPATIENT
Start: 2023-03-01

## 2023-03-01 NOTE — PROGRESS NOTES
"Chief Complaint  vaginal irritation (Pt is here with c/o vaginal irritation.  Pt states that she has been using vagisil which tends to work but has been having itching/irritation that has been going on for about 6 months now and is worse at night.  )    Subjective          Maria Victoria Aranda presents to [facility]  History of Present Illness    She states she has been using the Vagisil anti-itch cream once daily and at times daily.   She denies any evidence of vaginal discharge or odor.   She admits she started a new medication, Ozempic, has done so for 6 weeks.   Additionally, she has changed her detergent and soaps after getting irritation.     She is up to date on her mammogram and bone density testing.     Review of Systems   Constitutional: Negative for activity change, appetite change, fatigue and fever.   Respiratory: Negative for apnea and shortness of breath.    Cardiovascular: Negative for chest pain and palpitations.   Gastrointestinal: Negative for abdominal distention, abdominal pain, constipation, diarrhea, nausea and vomiting.   Endocrine: Negative for cold intolerance and heat intolerance.   Genitourinary: Negative for difficulty urinating, frequency, menstrual problem, pelvic pain, vaginal discharge and vaginal pain.        Vaginal irritation     Neurological: Negative for headaches.   Psychiatric/Behavioral: Negative for agitation and sleep disturbance.         Objective   Vital Signs:   /70   Ht 167.6 cm (66\")   Wt 68 kg (150 lb)   BMI 24.21 kg/m²     Physical Exam  Vitals and nursing note reviewed.   Constitutional:       General: She is not in acute distress.     Appearance: She is well-developed.   HENT:      Head: Normocephalic and atraumatic.   Pulmonary:      Effort: Pulmonary effort is normal.   Abdominal:      Palpations: Abdomen is soft.      Tenderness: There is no abdominal tenderness.   Genitourinary:     Exam position: Supine.      Labia:         Right: " No tenderness or lesion.         Left: No tenderness or lesion.       Vagina: No signs of injury. Vaginal discharge present. No tenderness or bleeding.      Cervix: No cervical motion tenderness, discharge or friability.      Uterus: Not enlarged and not tender.       Adnexa:         Right: No tenderness or fullness.          Left: No tenderness or fullness.        Comments: Vagina: scant discharge, no irritation present.   Inner labia : Irritation present, no broken skin.         Result Review :                       Assessment and Plan      Vaginal irritation  Nuswab taken. BV panel and Mycoplasma testing.   She will use nystatin cream as well as  triamcinolone twice daily for 7 days.  Directions on use and precautions were given.   Advised to contact office if irritation is not relieved in 2 weeks.      Diagnoses and all orders for this visit:    1. Vaginal irritation (Primary)  -     Genital Mycoplasmas REJI, Swab - Swab, Cervix  -     NuSwab Vaginitis (VG) - Swab, Vagina    Other orders  -     nystatin (MYCOSTATIN) [id number] UNIT/GM ointment; Apply 1 application topically to the appropriate area as directed 2 (Two) Times a Day.  Dispense: 30 g; Refill: 0  -     triamcinolone (KENALOG) 0.5 % cream; Apply 1 application topically to the appropriate area as directed 2 (Two) Times a Day.  Dispense: 30 g; Refill: 0          BMI is within normal parameters. No other follow-up for BMI required.       Follow Up   Return for Annual physical.    Patient was given instructions and counseling regarding her condition or for health maintenance advice. Please see specific information pulled into the AVS if appropriate.     Transcribed from ambient dictation for SARBJIT Monte by Francesca Medellin.  03/01/23   16:31 CST    Patient or patient representative verbalized consent to the visit recording.  I have personally performed the services described in this document as transcribed by the above individual, and it is both  accurate and complete.  Melody Maguire, APRN  3/1/2023  20:53 CST

## 2023-03-01 NOTE — PATIENT INSTRUCTIONS

## 2023-03-05 LAB
A VAGINAE DNA VAG QL NAA+PROBE: ABNORMAL SCORE
BVAB2 DNA VAG QL NAA+PROBE: ABNORMAL SCORE
C ALBICANS DNA VAG QL NAA+PROBE: NEGATIVE
C GLABRATA DNA VAG QL NAA+PROBE: NEGATIVE
M GENITALIUM DNA SPEC QL NAA+PROBE: NEGATIVE
M HOMINIS DNA SPEC QL NAA+PROBE: NEGATIVE
MEGA1 DNA VAG QL NAA+PROBE: ABNORMAL SCORE
T VAGINALIS DNA VAG QL NAA+PROBE: NEGATIVE
UREAPLASMA DNA SPEC QL NAA+PROBE: POSITIVE

## 2023-03-07 RX ORDER — AZITHROMYCIN 500 MG/1
1000 TABLET, FILM COATED ORAL ONCE
Qty: 2 TABLET | Refills: 0 | Status: SHIPPED | OUTPATIENT
Start: 2023-03-07 | End: 2023-03-07

## 2023-03-14 ENCOUNTER — TRANSCRIBE ORDERS (OUTPATIENT)
Dept: ADMINISTRATIVE | Facility: HOSPITAL | Age: 58
End: 2023-03-14
Payer: COMMERCIAL

## 2023-03-14 DIAGNOSIS — M81.0 AGE-RELATED OSTEOPOROSIS WITHOUT CURRENT PATHOLOGICAL FRACTURE: Primary | ICD-10-CM

## 2023-03-23 ENCOUNTER — LAB (OUTPATIENT)
Dept: LAB | Facility: HOSPITAL | Age: 58
End: 2023-03-23
Payer: COMMERCIAL

## 2023-03-23 ENCOUNTER — INFUSION (OUTPATIENT)
Dept: ONCOLOGY | Facility: HOSPITAL | Age: 58
End: 2023-03-23
Payer: COMMERCIAL

## 2023-03-23 VITALS
SYSTOLIC BLOOD PRESSURE: 119 MMHG | OXYGEN SATURATION: 97 % | DIASTOLIC BLOOD PRESSURE: 62 MMHG | TEMPERATURE: 98.4 F | HEIGHT: 66 IN | HEART RATE: 83 BPM | WEIGHT: 151 LBS | RESPIRATION RATE: 18 BRPM | BODY MASS INDEX: 24.27 KG/M2

## 2023-03-23 DIAGNOSIS — M81.0 AGE-RELATED OSTEOPOROSIS WITHOUT CURRENT PATHOLOGICAL FRACTURE: ICD-10-CM

## 2023-03-23 DIAGNOSIS — M81.0 AGE-RELATED OSTEOPOROSIS WITHOUT CURRENT PATHOLOGICAL FRACTURE: Primary | ICD-10-CM

## 2023-03-23 LAB
ALBUMIN SERPL-MCNC: 4.3 G/DL (ref 3.5–5.2)
ALBUMIN/GLOB SERPL: 1.8 G/DL
ALP SERPL-CCNC: 94 U/L (ref 39–117)
ALT SERPL W P-5'-P-CCNC: 9 U/L (ref 1–33)
ANION GAP SERPL CALCULATED.3IONS-SCNC: 10 MMOL/L (ref 5–15)
AST SERPL-CCNC: 15 U/L (ref 1–32)
BILIRUB SERPL-MCNC: 0.2 MG/DL (ref 0–1.2)
BUN SERPL-MCNC: 15 MG/DL (ref 6–20)
BUN/CREAT SERPL: 19.5 (ref 7–25)
CALCIUM SPEC-SCNC: 9 MG/DL (ref 8.6–10.5)
CHLORIDE SERPL-SCNC: 104 MMOL/L (ref 98–107)
CO2 SERPL-SCNC: 26 MMOL/L (ref 22–29)
CREAT SERPL-MCNC: 0.77 MG/DL (ref 0.57–1)
EGFRCR SERPLBLD CKD-EPI 2021: 90.7 ML/MIN/1.73
GLOBULIN UR ELPH-MCNC: 2.4 GM/DL
GLUCOSE SERPL-MCNC: 98 MG/DL (ref 65–99)
MAGNESIUM SERPL-MCNC: 2.1 MG/DL (ref 1.6–2.6)
PHOSPHATE SERPL-MCNC: 4.6 MG/DL (ref 2.5–4.5)
POTASSIUM SERPL-SCNC: 3.9 MMOL/L (ref 3.5–5.2)
PROT SERPL-MCNC: 6.7 G/DL (ref 6–8.5)
SODIUM SERPL-SCNC: 140 MMOL/L (ref 136–145)

## 2023-03-23 PROCEDURE — 82306 VITAMIN D 25 HYDROXY: CPT

## 2023-03-23 PROCEDURE — 80053 COMPREHEN METABOLIC PANEL: CPT

## 2023-03-23 PROCEDURE — 83735 ASSAY OF MAGNESIUM: CPT

## 2023-03-23 PROCEDURE — 96372 THER/PROPH/DIAG INJ SC/IM: CPT

## 2023-03-23 PROCEDURE — 25010000002 DENOSUMAB 60 MG/ML SOLUTION PREFILLED SYRINGE: Performed by: FAMILY MEDICINE

## 2023-03-23 PROCEDURE — 84100 ASSAY OF PHOSPHORUS: CPT

## 2023-03-23 PROCEDURE — 36415 COLL VENOUS BLD VENIPUNCTURE: CPT

## 2023-03-23 RX ADMIN — DENOSUMAB 60 MG: 60 INJECTION SUBCUTANEOUS at 14:53

## 2023-03-24 LAB — 25(OH)D3 SERPL-MCNC: 29.1 NG/ML (ref 30–100)

## 2023-04-04 ENCOUNTER — TRANSCRIBE ORDERS (OUTPATIENT)
Dept: ADMINISTRATIVE | Facility: HOSPITAL | Age: 58
End: 2023-04-04
Payer: COMMERCIAL

## 2023-04-04 ENCOUNTER — LAB (OUTPATIENT)
Dept: LAB | Facility: HOSPITAL | Age: 58
End: 2023-04-04
Payer: COMMERCIAL

## 2023-04-04 DIAGNOSIS — S86.301D UNSPECIFIED INJURY OF MUSCLE(S) AND TENDON(S) OF PERONEAL MUSCLE GROUP AT LOWER LEG LEVEL, RIGHT LEG, SUBSEQUENT ENCOUNTER: ICD-10-CM

## 2023-04-04 DIAGNOSIS — E78.5 HYPERLIPIDEMIA, UNSPECIFIED HYPERLIPIDEMIA TYPE: Primary | ICD-10-CM

## 2023-04-04 DIAGNOSIS — Z01.812 ENCOUNTER FOR PREPROCEDURAL LABORATORY EXAMINATION: ICD-10-CM

## 2023-04-04 DIAGNOSIS — K50.90 CROHN'S DISEASE WITHOUT COMPLICATION, UNSPECIFIED GASTROINTESTINAL TRACT LOCATION: ICD-10-CM

## 2023-04-04 DIAGNOSIS — E78.5 HYPERLIPIDEMIA, UNSPECIFIED HYPERLIPIDEMIA TYPE: ICD-10-CM

## 2023-04-04 LAB
25(OH)D3 SERPL-MCNC: 27.8 NG/ML (ref 30–100)
ALBUMIN SERPL-MCNC: 4.3 G/DL (ref 3.5–5.2)
ALBUMIN/GLOB SERPL: 1.9 G/DL
ALP SERPL-CCNC: 83 U/L (ref 39–117)
ALT SERPL W P-5'-P-CCNC: 10 U/L (ref 1–33)
ANION GAP SERPL CALCULATED.3IONS-SCNC: 9 MMOL/L (ref 5–15)
AST SERPL-CCNC: 13 U/L (ref 1–32)
BASOPHILS # BLD AUTO: 0.04 10*3/MM3 (ref 0–0.2)
BASOPHILS NFR BLD AUTO: 0.5 % (ref 0–1.5)
BILIRUB SERPL-MCNC: 0.2 MG/DL (ref 0–1.2)
BUN SERPL-MCNC: 14 MG/DL (ref 6–20)
BUN/CREAT SERPL: 17.3 (ref 7–25)
CALCIUM SPEC-SCNC: 8.7 MG/DL (ref 8.6–10.5)
CHLORIDE SERPL-SCNC: 107 MMOL/L (ref 98–107)
CO2 SERPL-SCNC: 23 MMOL/L (ref 22–29)
CREAT SERPL-MCNC: 0.81 MG/DL (ref 0.57–1)
DEPRECATED RDW RBC AUTO: 42.5 FL (ref 37–54)
EGFRCR SERPLBLD CKD-EPI 2021: 85.3 ML/MIN/1.73
EOSINOPHIL # BLD AUTO: 0.13 10*3/MM3 (ref 0–0.4)
EOSINOPHIL NFR BLD AUTO: 1.5 % (ref 0.3–6.2)
ERYTHROCYTE [DISTWIDTH] IN BLOOD BY AUTOMATED COUNT: 13 % (ref 12.3–15.4)
GLOBULIN UR ELPH-MCNC: 2.3 GM/DL
GLUCOSE SERPL-MCNC: 81 MG/DL (ref 65–99)
HCT VFR BLD AUTO: 37.2 % (ref 34–46.6)
HGB BLD-MCNC: 11.9 G/DL (ref 12–15.9)
IMM GRANULOCYTES # BLD AUTO: 0.02 10*3/MM3 (ref 0–0.05)
IMM GRANULOCYTES NFR BLD AUTO: 0.2 % (ref 0–0.5)
LYMPHOCYTES # BLD AUTO: 3.08 10*3/MM3 (ref 0.7–3.1)
LYMPHOCYTES NFR BLD AUTO: 35.4 % (ref 19.6–45.3)
MCH RBC QN AUTO: 28.4 PG (ref 26.6–33)
MCHC RBC AUTO-ENTMCNC: 32 G/DL (ref 31.5–35.7)
MCV RBC AUTO: 88.8 FL (ref 79–97)
MONOCYTES # BLD AUTO: 0.4 10*3/MM3 (ref 0.1–0.9)
MONOCYTES NFR BLD AUTO: 4.6 % (ref 5–12)
NEUTROPHILS NFR BLD AUTO: 5.03 10*3/MM3 (ref 1.7–7)
NEUTROPHILS NFR BLD AUTO: 57.8 % (ref 42.7–76)
NRBC BLD AUTO-RTO: 0 /100 WBC (ref 0–0.2)
PLATELET # BLD AUTO: 220 10*3/MM3 (ref 140–450)
PMV BLD AUTO: 12.7 FL (ref 6–12)
POTASSIUM SERPL-SCNC: 4.1 MMOL/L (ref 3.5–5.2)
PROT SERPL-MCNC: 6.6 G/DL (ref 6–8.5)
RBC # BLD AUTO: 4.19 10*6/MM3 (ref 3.77–5.28)
SODIUM SERPL-SCNC: 139 MMOL/L (ref 136–145)
WBC NRBC COR # BLD: 8.7 10*3/MM3 (ref 3.4–10.8)

## 2023-04-04 PROCEDURE — 87086 URINE CULTURE/COLONY COUNT: CPT

## 2023-04-04 PROCEDURE — 80053 COMPREHEN METABOLIC PANEL: CPT

## 2023-04-04 PROCEDURE — 87186 SC STD MICRODIL/AGAR DIL: CPT

## 2023-04-04 PROCEDURE — 82306 VITAMIN D 25 HYDROXY: CPT

## 2023-04-04 PROCEDURE — 85025 COMPLETE CBC W/AUTO DIFF WBC: CPT

## 2023-04-04 PROCEDURE — 87088 URINE BACTERIA CULTURE: CPT

## 2023-04-06 LAB — BACTERIA SPEC AEROBE CULT: ABNORMAL

## 2023-05-17 ENCOUNTER — LAB (OUTPATIENT)
Dept: LAB | Facility: HOSPITAL | Age: 58
End: 2023-05-17
Payer: COMMERCIAL

## 2023-05-17 ENCOUNTER — TRANSCRIBE ORDERS (OUTPATIENT)
Dept: ADMINISTRATIVE | Facility: HOSPITAL | Age: 58
End: 2023-05-17
Payer: COMMERCIAL

## 2023-05-17 DIAGNOSIS — G25.81 RESTLESS LEGS SYNDROME: ICD-10-CM

## 2023-05-17 DIAGNOSIS — M62.831 MUSCLE SPASM OF CALF: ICD-10-CM

## 2023-05-17 DIAGNOSIS — M81.0 AGE-RELATED OSTEOPOROSIS WITHOUT CURRENT PATHOLOGICAL FRACTURE: ICD-10-CM

## 2023-05-17 DIAGNOSIS — M79.10 MYALGIA: ICD-10-CM

## 2023-05-17 DIAGNOSIS — M79.10 MYALGIA: Primary | ICD-10-CM

## 2023-05-17 LAB
ANION GAP SERPL CALCULATED.3IONS-SCNC: 11 MMOL/L (ref 4–13)
AUTO MIXED CELLS #: 0.6 10*3/MM3 (ref 0.1–2.6)
AUTO MIXED CELLS %: 6 % (ref 0.1–24)
BUN SERPL-MCNC: 16 MG/DL (ref 5–21)
BUN/CREAT SERPL: 21.3
CALCIUM SPEC-SCNC: 8.8 MG/DL (ref 8.4–10.4)
CHLORIDE SERPL-SCNC: 106 MMOL/L (ref 98–110)
CO2 SERPL-SCNC: 25 MMOL/L (ref 24–31)
CREAT SERPL-MCNC: 0.75 MG/DL (ref 0.5–1.4)
EGFRCR SERPLBLD CKD-EPI 2021: 93.6 ML/MIN/1.73
ERYTHROCYTE [DISTWIDTH] IN BLOOD BY AUTOMATED COUNT: 14.8 % (ref 12.3–15.4)
GLUCOSE SERPL-MCNC: 101 MG/DL (ref 70–100)
HCT VFR BLD AUTO: 39.6 % (ref 34–46.6)
HGB BLD-MCNC: 12.7 G/DL (ref 12–15.9)
LYMPHOCYTES # BLD AUTO: 2.8 10*3/MM3 (ref 0.7–3.1)
LYMPHOCYTES NFR BLD AUTO: 27.1 % (ref 19.6–45.3)
MAGNESIUM SERPL-MCNC: 2.1 MG/DL (ref 1.6–2.6)
MCH RBC QN AUTO: 27.4 PG (ref 26.6–33)
MCHC RBC AUTO-ENTMCNC: 32.1 G/DL (ref 31.5–35.7)
MCV RBC AUTO: 85.5 FL (ref 79–97)
NEUTROPHILS NFR BLD AUTO: 6.8 10*3/MM3 (ref 1.7–7)
NEUTROPHILS NFR BLD AUTO: 66.9 % (ref 42.7–76)
PLATELET # BLD AUTO: 218 10*3/MM3 (ref 140–450)
PMV BLD AUTO: 11.5 FL (ref 6–12)
POTASSIUM SERPL-SCNC: 4.1 MMOL/L (ref 3.5–5.3)
RBC # BLD AUTO: 4.63 10*6/MM3 (ref 3.77–5.28)
SODIUM SERPL-SCNC: 142 MMOL/L (ref 135–145)
WBC NRBC COR # BLD: 10.2 10*3/MM3 (ref 3.4–10.8)

## 2023-05-17 PROCEDURE — 83735 ASSAY OF MAGNESIUM: CPT

## 2023-05-17 PROCEDURE — 85025 COMPLETE CBC W/AUTO DIFF WBC: CPT

## 2023-05-17 PROCEDURE — 36415 COLL VENOUS BLD VENIPUNCTURE: CPT

## 2023-05-17 PROCEDURE — 80048 BASIC METABOLIC PNL TOTAL CA: CPT

## 2023-06-05 RX ORDER — MESALAMINE 250 MG/1
500 CAPSULE ORAL 2 TIMES DAILY
Qty: 120 CAPSULE | Refills: 1 | Status: SHIPPED | OUTPATIENT
Start: 2023-06-05

## 2023-08-14 RX ORDER — MESALAMINE 250 MG/1
CAPSULE ORAL
Qty: 120 CAPSULE | Refills: 1 | Status: SHIPPED | OUTPATIENT
Start: 2023-08-14

## 2023-08-29 ENCOUNTER — OFFICE VISIT (OUTPATIENT)
Dept: OBSTETRICS AND GYNECOLOGY | Facility: CLINIC | Age: 58
End: 2023-08-29
Payer: COMMERCIAL

## 2023-08-29 VITALS
BODY MASS INDEX: 24.75 KG/M2 | DIASTOLIC BLOOD PRESSURE: 74 MMHG | HEIGHT: 66 IN | SYSTOLIC BLOOD PRESSURE: 102 MMHG | WEIGHT: 154 LBS

## 2023-08-29 DIAGNOSIS — Z01.419 ENCOUNTER FOR GYNECOLOGICAL EXAMINATION WITHOUT ABNORMAL FINDING: Primary | ICD-10-CM

## 2023-08-29 RX ORDER — DOXYCYCLINE HYCLATE 100 MG
1 TABLET ORAL EVERY 12 HOURS SCHEDULED
COMMUNITY
Start: 2023-07-27

## 2023-08-29 RX ORDER — TIZANIDINE 2 MG/1
2 TABLET ORAL NIGHTLY
COMMUNITY
Start: 2023-05-17

## 2023-08-29 RX ORDER — METHOCARBAMOL 750 MG/1
750 TABLET, FILM COATED ORAL 4 TIMES DAILY PRN
COMMUNITY
Start: 2023-05-02

## 2023-08-29 RX ORDER — CYCLOBENZAPRINE HCL 5 MG
TABLET ORAL
COMMUNITY
Start: 2023-05-04

## 2023-08-29 NOTE — PROGRESS NOTES
"Chief Complaint  Annual Exam (PT is here for an annual exam. /Last mammogram 1/24/23 benign/Dexa scan 1/24/23 osteoporosis, pt doing prolia shots. /Pt has no complaints today )    Subjective          Maria Victoria Aranda presents to [facility]  History of Present Illness    The patient is present for her annual examination.    She has difficulty with sleeping and losing weight.    She underwent a total hysterectomy at the age of 35.   She questioned if hormone levels could be checked as she is experiencing weight gain recently and sleep disturbance.    She denied vaginal discharge, itching, or odor.  The patient denied pelvic pressure or pain.  She confirmed nighttime urinary frequency.  She denied urinary incontinence.    Review of Systems   Constitutional:  Negative for activity change, appetite change, fatigue and fever.   HENT:  Negative for congestion, sore throat and trouble swallowing.    Eyes:  Negative for pain, discharge and visual disturbance.   Respiratory:  Negative for apnea, shortness of breath and wheezing.    Cardiovascular:  Negative for chest pain, palpitations and leg swelling.   Gastrointestinal:  Negative for abdominal pain, constipation and diarrhea.   Genitourinary:  Negative for frequency, pelvic pain, urgency and vaginal discharge.        Dribble after urinating    Musculoskeletal:  Negative for back pain and gait problem.   Skin:  Negative for color change and rash.   Neurological:  Negative for dizziness, weakness and numbness.   Psychiatric/Behavioral:  Negative for confusion and sleep disturbance.       Objective   Vital Signs:   /74   Ht 167.6 cm (66\")   Wt 69.9 kg (154 lb)   BMI 24.86 kg/mý     Physical Exam  Vitals and nursing note reviewed.   Constitutional:       Appearance: She is well-developed.   HENT:      Head: Normocephalic and atraumatic.      Right Ear: External ear normal.      Left Ear: External ear normal.   Eyes:      General: No scleral " icterus.        Right eye: No discharge.         Left eye: No discharge.      Conjunctiva/sclera: Conjunctivae normal.   Neck:      Thyroid: No thyromegaly.      Vascular: No carotid bruit.   Cardiovascular:      Rate and Rhythm: Regular rhythm.      Heart sounds: Normal heart sounds. No murmur heard.  Pulmonary:      Effort: Pulmonary effort is normal. No respiratory distress.      Breath sounds: Normal breath sounds.   Chest:   Breasts:     Breasts are symmetrical.      Right: No inverted nipple, mass, nipple discharge, skin change or tenderness.      Left: No inverted nipple, mass, nipple discharge, skin change or tenderness.   Abdominal:      General: Bowel sounds are normal. There is no distension.      Palpations: Abdomen is soft. There is no mass.      Tenderness: There is no abdominal tenderness. There is no guarding.      Hernia: No hernia is present. There is no hernia in the left inguinal area.   Genitourinary:     Labia:         Right: No rash, tenderness, lesion or injury.         Left: No rash, tenderness, lesion or injury.       Vagina: Normal. No signs of injury. No vaginal discharge, erythema or bleeding.      Rectum: No mass.      Comments: Cervix, Uterus and Adnexa are surgically absent.  Urethra and urethral meatus normal  Bladder, normal no prolapse  Perineum and anus examined and without lesions  Musculoskeletal:         General: No tenderness. Normal range of motion.      Cervical back: Normal range of motion and neck supple.   Lymphadenopathy:      Head:      Right side of head: No submental, submandibular, tonsillar, preauricular, posterior auricular or occipital adenopathy.      Left side of head: No submental, submandibular, tonsillar, preauricular, posterior auricular or occipital adenopathy.      Cervical: No cervical adenopathy.      Right cervical: No superficial, deep or posterior cervical adenopathy.     Left cervical: No superficial, deep or posterior cervical adenopathy.   Skin:      General: Skin is warm and dry.      Findings: No bruising, erythema or rash.   Neurological:      Mental Status: She is alert and oriented to person, place, and time.      Motor: No abnormal muscle tone.      Coordination: Coordination normal.   Psychiatric:         Behavior: Behavior normal.         Thought Content: Thought content normal.         Judgment: Judgment normal.       Result Review :                     Assessment and Plan      Well woman GYN exam.   Pap smear not indicated per ASCCP guidelines.   Will have lab work at PCP.     Informed the patient if 5 years have passed without hormone replacement, it is not advised.     Educated the patient life stressors can affect sleep and directly affect weight.  Discussed decreasing the amount of caffeine and spicy food intake to lessen nighttime urination.  Pt may leave a urine for UA/culture.   Educated on melatonin use for sleep.    Encouraged SBE, pt is aware how to do self breast exam and the importance of same.   Discussed weight management and importance of maintaining a healthy weight.   Discussed Vitamin D intake and the importance of adequate vitamin D for both Bone Health and a healthy immune system.    Discussed Daily exercise and the importance of same, in regards to a healthy heart as well as helping to maintain her weight and improving her mental health.     Colonoscopy is up to date.    Bone density followed by PCP, takes prolia.     Discussed STD prevention and testing.   Pt declines Chlamydia/Gonorrhea/Trichomonas, RPR, Hep panel and HIV testing.     Mammogram will be scheduled at [facility], followed by PCP.     Diagnoses and all orders for this visit:    1. Encounter for gynecological examination without abnormal finding (Primary)          BMI is within normal parameters. No other follow-up for BMI required.       Follow Up   Return for Annual physical.    Patient was given instructions and counseling regarding her condition or for health  maintenance advice. Please see specific information pulled into the AVS if appropriate.     Transcribed from ambient dictation for SARBJIT Monte by Evelin Choe.   08/29/23   11:05 CDT    Patient or patient representative verbalized consent to the visit recording.  I have personally performed the services described in this document as transcribed by the above individual, and it is both accurate and complete.  SARBJIT Monte  8/29/2023  16:26 CDT

## 2023-08-29 NOTE — PATIENT INSTRUCTIONS

## 2023-09-13 ENCOUNTER — TRANSCRIBE ORDERS (OUTPATIENT)
Dept: ADMINISTRATIVE | Facility: HOSPITAL | Age: 58
End: 2023-09-13
Payer: COMMERCIAL

## 2023-09-13 DIAGNOSIS — M81.0 AGE-RELATED OSTEOPOROSIS WITHOUT CURRENT PATHOLOGICAL FRACTURE: Primary | ICD-10-CM

## 2023-09-25 ENCOUNTER — OFFICE VISIT (OUTPATIENT)
Dept: GASTROENTEROLOGY | Facility: CLINIC | Age: 58
End: 2023-09-25

## 2023-09-25 ENCOUNTER — LAB (OUTPATIENT)
Dept: LAB | Facility: HOSPITAL | Age: 58
End: 2023-09-25
Payer: COMMERCIAL

## 2023-09-25 ENCOUNTER — INFUSION (OUTPATIENT)
Dept: ONCOLOGY | Facility: HOSPITAL | Age: 58
End: 2023-09-25

## 2023-09-25 VITALS
BODY MASS INDEX: 25.07 KG/M2 | RESPIRATION RATE: 18 BRPM | HEART RATE: 64 BPM | DIASTOLIC BLOOD PRESSURE: 66 MMHG | TEMPERATURE: 97.6 F | HEIGHT: 66 IN | WEIGHT: 156 LBS | OXYGEN SATURATION: 100 % | SYSTOLIC BLOOD PRESSURE: 113 MMHG

## 2023-09-25 VITALS
HEART RATE: 77 BPM | WEIGHT: 156 LBS | SYSTOLIC BLOOD PRESSURE: 110 MMHG | DIASTOLIC BLOOD PRESSURE: 74 MMHG | BODY MASS INDEX: 25.07 KG/M2 | OXYGEN SATURATION: 97 % | HEIGHT: 66 IN | TEMPERATURE: 97.8 F

## 2023-09-25 DIAGNOSIS — M81.0 AGE-RELATED OSTEOPOROSIS WITHOUT CURRENT PATHOLOGICAL FRACTURE: ICD-10-CM

## 2023-09-25 DIAGNOSIS — K50.919 CROHN'S DISEASE WITH COMPLICATION, UNSPECIFIED GASTROINTESTINAL TRACT LOCATION: Primary | ICD-10-CM

## 2023-09-25 DIAGNOSIS — M81.0 AGE-RELATED OSTEOPOROSIS WITHOUT CURRENT PATHOLOGICAL FRACTURE: Primary | ICD-10-CM

## 2023-09-25 DIAGNOSIS — K21.9 GASTROESOPHAGEAL REFLUX DISEASE WITHOUT ESOPHAGITIS: ICD-10-CM

## 2023-09-25 LAB
25(OH)D3 SERPL-MCNC: 36.3 NG/ML (ref 30–100)
ALBUMIN SERPL-MCNC: 4.1 G/DL (ref 3.5–5.2)
ALBUMIN/GLOB SERPL: 1.6 G/DL
ALP SERPL-CCNC: 72 U/L (ref 39–117)
ALT SERPL W P-5'-P-CCNC: 8 U/L (ref 1–33)
ANION GAP SERPL CALCULATED.3IONS-SCNC: 11 MMOL/L (ref 5–15)
AST SERPL-CCNC: 10 U/L (ref 1–32)
BILIRUB SERPL-MCNC: 0.3 MG/DL (ref 0–1.2)
BUN SERPL-MCNC: 13 MG/DL (ref 6–20)
BUN/CREAT SERPL: 18.6 (ref 7–25)
CALCIUM SPEC-SCNC: 8.7 MG/DL (ref 8.6–10.5)
CHLORIDE SERPL-SCNC: 106 MMOL/L (ref 98–107)
CO2 SERPL-SCNC: 24 MMOL/L (ref 22–29)
CREAT SERPL-MCNC: 0.7 MG/DL (ref 0.57–1)
EGFRCR SERPLBLD CKD-EPI 2021: 101.6 ML/MIN/1.73
GLOBULIN UR ELPH-MCNC: 2.5 GM/DL
GLUCOSE SERPL-MCNC: 92 MG/DL (ref 65–99)
MAGNESIUM SERPL-MCNC: 2.4 MG/DL (ref 1.6–2.6)
PHOSPHATE SERPL-MCNC: 3.1 MG/DL (ref 2.5–4.5)
POTASSIUM SERPL-SCNC: 4.2 MMOL/L (ref 3.5–5.2)
PROT SERPL-MCNC: 6.6 G/DL (ref 6–8.5)
SODIUM SERPL-SCNC: 141 MMOL/L (ref 136–145)

## 2023-09-25 PROCEDURE — 83735 ASSAY OF MAGNESIUM: CPT

## 2023-09-25 PROCEDURE — 84100 ASSAY OF PHOSPHORUS: CPT

## 2023-09-25 PROCEDURE — 80053 COMPREHEN METABOLIC PANEL: CPT

## 2023-09-25 PROCEDURE — 36415 COLL VENOUS BLD VENIPUNCTURE: CPT

## 2023-09-25 PROCEDURE — 99213 OFFICE O/P EST LOW 20 MIN: CPT | Performed by: INTERNAL MEDICINE

## 2023-09-25 PROCEDURE — 82306 VITAMIN D 25 HYDROXY: CPT

## 2023-09-25 PROCEDURE — 96372 THER/PROPH/DIAG INJ SC/IM: CPT

## 2023-09-25 PROCEDURE — 25010000002 DENOSUMAB 60 MG/ML SOLUTION PREFILLED SYRINGE: Performed by: FAMILY MEDICINE

## 2023-09-25 RX ORDER — MESALAMINE 250 MG/1
500 CAPSULE ORAL 2 TIMES DAILY
Qty: 120 CAPSULE | Refills: 11 | Status: SHIPPED | OUTPATIENT
Start: 2023-09-25

## 2023-09-25 RX ORDER — ERGOCALCIFEROL 1.25 MG/1
50000 CAPSULE ORAL WEEKLY
COMMUNITY

## 2023-09-25 RX ADMIN — DENOSUMAB 60 MG: 60 INJECTION SUBCUTANEOUS at 13:43

## 2023-09-25 NOTE — PROGRESS NOTES
[facility] Gastroenterology    Chief Complaint   Patient presents with    Crohn's Disease       Subjective     HPI    Maria Victoria Aranda is a 58 y.o. female who presents with a chief complaint of Crohn's.    She comes in for an annual checkup.  Colonoscopy in May of last year revealed no active disease.  She did have a small lipoma.  It was recommended she continue her Pentasa 500 mg twice daily.  She tells me she is taking that medication is tolerating it well.  Denies any symptoms.  She has more constipation than anything.  Does not often.  Denies diarrhea.  No melena or bright red blood per rectum.  No abdominal discomfort.  She did lose some weight but she is gained some of back.    She states she is not on her reflux medications.  She stopped taking it several months ago.  She will rarely have heartburn or indigestion.  If she has anything it is regurgitation.  She will take a Tums and that relieves her symptoms.  No dysphagia.  She just had blood work to include a complete chemistry panel this past week which are reviewed.  Her last CBC was in May which was unremarkable as well.        ============== copy of May 9, 2022 HPI==========  HPI     Maria Victoria Aranda is a 57 y.o. female who presents with a chief complaint of Crohn's.     She comes in for her annual visit.  She was here in November and had some nausea.  Upper endoscopy was unremarkable we did check CT enterography which was unremarkable.  She tells me she is doing well at this point.  Denies any abdominal pain.  No nausea.  She does not have any heartburn.  She states she had a little bit of abdominal discomfort 2 weeks ago but at that time her father just passed away she states it was an emotional couple of days.  That has subsided.     She continues on Pentasa 2 tablets twice daily.  She used to be on higher doses of Pentasa.  She is not have any troubles with her Crohn's.  She is never been on more advanced medication better than the Pentasa.  It has been  "3 years since her last colonoscopy.     Regarding her heartburn this is under control.  She states she has not really had heartburn in quite a long time.  She continues on Protonix daily.  No breakthrough symptoms.  =============== copy of November 2021 HPI===============================     HISTORY OF PRESENT ILLNESS:      Maria Victoria Aranda is a 56 y.o. female presents with nausea/vomiting for 2 months. She can have nausea with the vomiting. No triggers. Last episode was 2 weeks ago. Emesis is yellow/clear. Takes protonix daily.   No change in appetite. Has had some increased stress \" nothing major.   No new medications.  No fever or chills. No weight loss. No abdominal pain. No headaches.            No recent labs or imaging of the abdomen.            She has crohn's disease. Taking pentasa 2 twice daily. Has 1 bm /day. No constipated.  No rectal bleeding.         Last colonoscopy 5/2019  - Crohn's disease with ileitis. Biopsied.  - Internal hemorrhoids.  - The examination was otherwise normal on direct and retroflexion views.  - Background biopsies were taken from the cecum and rectum.        Final Diagnosis   1.  Gastroesophageal junction, biopsy:       Moderate chronic esophagogastritis.       No intestinal metaplasia.     2.  Terminal ileum, biopsy:       No evidence of active ileitis.       No adenomatous or dysplastic changes.     3.  Cecum, biopsy:       No evidence of active colitis.       No adenomatous or dysplastic changes.     4.  Rectum, biopsy:       No active colitis.       No adenomatous or dysplastic changes.               Last egd 5/2019   - Z-line variable, at the gastroesophageal junction. Biopsied.  - Normal stomach.  - Normal examined duodenum.        ====================================================  Ov 5-6-21 HPI  Maria Victoria Aranda is a 56 y.o. female who presents with a chief complaint of Crohn's.     She tells me she is doing well.  Denies abdominal pain.  She states she occasionally has loose " stools.  Back in winter she had a loose stool maybe once a week.  Now she maybe has loose stool once a month.  Sometimes it is related to what she eats.  She never passed any blood or mucus.  She denies any abdominal cramping.  Her weights been stable.  She is not having significant heartburn.  Denies dysphagia.  She states she is at her baseline.  She has no complaints.  She tells me she just recently had lab work done by her PCP.        ============== May 2020 HPI= 9==========================================  HPI     Maria Victoria Aranda is a 55 y.o. female who presents with a chief complaint of Crohn's     Since she was here last year in April, she underwent endoscopy and colonoscopy in May 2019.  Colonoscopy revealed 1 small aphthous ulcer in the terminal ileum.  She was advised to quit smoking, avoid NSAIDs and continue Pentasa.  Upper endoscopy was unremarkable.  Biopsies of the GE junction were unremarkable.     She tells me she is asymptomatic.  She wants a while have some loose stools.  Usually occurs if she eats the wrong things.  Heartburn is under relatively good control.  She will have some discomfort and she points to her left shoulder at times.  She thinks is more of arthralgias in her shoulder and in her muscles of her chest.  She does not think she is getting that much reflux.  She denies any blood in her stools.  She is not losing weight of anything she is gained weight she states.  She has no lower abdominal cramps or discomfort.  She continues on Pentasa twice a day.  She also continues on her reflux medications.  She does miss a dose of her PPI then she has breakthrough symptoms.     =============== April 2019 HPI=====================     HISTORY OF PRESENT ILLNESS     Maria Victoria Aranda is a 54 y.o. female presents  with reflux.  Symptoms are mainly of a burning sensation of the chest as well as an aching sensation.  She has had trouble with this intermittently over the last year.  She has been on Dexilant  over the last year which does not seem to control her symptoms.  She has tried Prevacid with no help.  Was on Protonix several years ago which did control her symptoms but her insurance would not pay for it.  Certain foods that can trigger spicy and greasy foods.  She does smoke.  States that she is trying to quit.  She is decreasing her caffeine intake as well.    She denies any exertional pain.  No dysphagia.  No nausea vomiting.  No abdominal pain.  No unintentional weight loss.     Has had EGD in the past by Dr. Parada there was question of Drake's in the past per patient history.  Her last upper endoscopy by Dr. Parada was last year.     States that she was diagnosed with Crohn's disease 20 years ago.  Has been followed by Dr. Parada.  Has taken Pentasa over the last 20 years. Has never taken any other meds or steroids for crohns.   She does move her bowels daily.  No rectal bleeding.  No weight loss.  No nausea vomiting.  No abdominal pain.  No skin rash.  No joint pain.  No ocular pain. No history of abdominal surgeries for Crohn's.     She denies personal history of colon polyps or colon cancer.  She has a sister had a precancerous polyp requiring surgery at age 43.  Her mother had colon cancer at age 48       Past Medical History:   Diagnosis Date    Anxiety     Crohn's disease     Esophagitis determined by endoscopy     Ovarian cyst     PONV (postoperative nausea and vomiting)     Trigger finger     Varicella        Past Surgical History:   Procedure Laterality Date    ANKLE SURGERY Right     APPENDECTOMY      CARPAL TUNNEL RELEASE      CHOLECYSTECTOMY      COLONOSCOPY      COLONOSCOPY N/A 05/01/2019    Procedure: COLONOSCOPY WITH ANESTHESIA;  Surgeon: Sreedhar Ball MD;  Location: [facility] ENDOSCOPY;  Service: Gastroenterology    COLONOSCOPY N/A 05/31/2022    Procedure: COLONOSCOPY;  Surgeon: Sreedhar Ball MD;  Location: [facility] ENDOSCOPY;  Service: Gastroenterology;  Laterality: N/A;  pre  crohn's  post lipoma; diverticulosis  dr murcia    CYST REMOVAL      CYST REMOVAL      hand    D & C WITH SUCTION  1988    ENDOSCOPY  03/2018    ENDOSCOPY N/A 05/01/2019    Procedure: ESOPHAGOGASTRODUODENOSCOPY WITH ANESTHESIA;  Surgeon: Sreedhar Ball MD;  Location: [facility] ENDOSCOPY;  Service: Gastroenterology    ENDOSCOPY N/A 12/13/2021    Procedure: ESOPHAGOGASTRODUODENOSCOPY WITH ANESTHESIA;  Surgeon: Sreedhar Ball MD;  Location: [facility] ENDOSCOPY;  Service: Gastroenterology;  Laterality: N/A;  pre op: nausea, vomiting  post op:normal  PCP: Paolo Murcia MD    HAND TENDON SURGERY      LAPAROSCOPIC CHOLECYSTECTOMY  2018    MYOMECTOMY ABDOMINAL APPROACH  1984    OOPHORECTOMY      SALPINGECTOMY      TONSILLECTOMY      TOTAL ABDOMINAL HYSTERECTOMY WITH SALPINGO OOPHORECTOMY      RODRICK w/ BSO due to bleeding problems    TRIGGER FINGER RELEASE      x 2    TUBAL ABDOMINAL LIGATION  1991         Current Outpatient Medications:     Denosumab (PROLIA SC), Inject  under the skin into the appropriate area as directed Every 6 (Six) Months., Disp: , Rfl:     DULoxetine (CYMBALTA) 60 MG capsule, Take 1 capsule by mouth Daily., Disp: , Rfl:     ezetimibe (ZETIA) 10 MG tablet, Take 1 tablet by mouth Daily., Disp: 30 tablet, Rfl: 5    mesalamine (Pentasa) 250 MG CR capsule, Take 2 capsules by mouth 2 (Two) Times a Day., Disp: 120 capsule, Rfl: 11    vitamin D (ERGOCALCIFEROL) 1.25 MG (04010 UT) capsule capsule, Take 1 capsule by mouth 1 (One) Time Per Week., Disp: , Rfl:     cyclobenzaprine (FLEXERIL) 5 MG tablet, , Disp: , Rfl:     doxycycline (VIBRAMYICN) 100 MG tablet, Take 1 tablet by mouth Every 12 (Twelve) Hours., Disp: , Rfl:     methocarbamol (ROBAXIN) 750 MG tablet, Take 1 tablet by mouth 4 (Four) Times a Day As Needed for Muscle Spasms., Disp: , Rfl:     tiZANidine (ZANAFLEX) 2 MG tablet, Take 1 tablet by mouth Every Night., Disp: , Rfl:   No current facility-administered medications for this  visit.    Allergies   Allergen Reactions    Other Swelling     Product containing glucocorticoid    Wasp Venom Itching       Social History     Socioeconomic History    Marital status:    Tobacco Use    Smoking status: Every Day     Packs/day: 0.50     Years: 15.00     Pack years: 7.50     Types: Cigarettes    Smokeless tobacco: Never   Vaping Use    Vaping Use: Never used   Substance and Sexual Activity    Alcohol use: Yes     Comment: rare    Drug use: No    Sexual activity: Yes     Partners: Male     Birth control/protection: Surgical       Family History   Problem Relation Age of Onset    Hypertension Father     Heart disease Paternal Grandmother     Colon cancer Mother         at age 48    Colon polyps Sister         precancerous polyp requring surgery at age 43.     Breast cancer Neg Hx     Ovarian cancer Neg Hx        Review of Systems  No dysphagia,    Objective     Vitals:    09/25/23 1416   BP: 110/74   Pulse: 77   Temp: 97.8 °F (36.6 °C)   SpO2: 97%       Physical Exam  No acute distress. Vital signs as documented.  Sclera anicteric.  Neck without noticeable JVD. Lungs clear to auscultation. Heart exam notable for regular rhythm, normal sounds. Abdomen is soft, nontender, non distended, normal bowel sounds and without evidence of organomegaly, masses.  Neuro alert, moves extremities.        Assessment & Plan   Problem List Items Addressed This Visit          Gastrointestinal Abdominal     Gastroesophageal reflux disease    Crohn's disease with complication - Primary    Overview     Originally diagnosed by Dr. Parada before the year 2000.  Treated with Pentasa.  Never had surgery or immunosuppressive therapy.  Colonoscopy May 2019 revealed 1 small aphthous ulcer in the TI  CT enterography December 2021, no active Crohn's noted  Colonoscopy May 2022 no active disease.    Surveillance colonoscopy recommended again approximately May 2025              First, regarding her reflux is under control  without medications.  I reinforced reflux precautions.  I did suggest over-the-counter Tums or Pepcid AC once or twice daily for breakthrough heartburn.  She could try Pepcid Complete as well as directed.    Regarding her Crohn's she remains asymptomatic.  Her colonoscopy last year showed no active disease.  Plan to continue on Pentasa.  She has never really had symptomatic Crohn's.  Plan to observe.  See her back in the office in 1 year.    Continue ongoing management by primary care provider and other specialists.     Body mass index is 25.18 kg/m².  Stable BMI      EMR Dragon/transcription disclaimer:  Much of this encounter note is electronic transcription/translation of spoken language to printed text.  The electronic translation of spoken language may be erroneous, or at times, nonsensical words or phrases may be inadvertently transcribed.  Although I have reviewed the note for such errors, some may still exist.    Sreedhar Ball MD  14:48 CDT  09/25/23

## 2023-11-16 RX ORDER — NYSTATIN 100000 U/G
1 CREAM TOPICAL 2 TIMES DAILY
Qty: 30 G | Refills: 0 | Status: SHIPPED | OUTPATIENT
Start: 2023-11-16

## 2024-01-04 ENCOUNTER — TRANSCRIBE ORDERS (OUTPATIENT)
Dept: ADMINISTRATIVE | Facility: HOSPITAL | Age: 59
End: 2024-01-04
Payer: COMMERCIAL

## 2024-01-04 DIAGNOSIS — Z12.31 ENCOUNTER FOR SCREENING MAMMOGRAM FOR MALIGNANT NEOPLASM OF BREAST: Primary | ICD-10-CM

## 2024-01-16 LAB
NCCN CRITERIA FLAG: ABNORMAL
TYRER CUZICK SCORE: 4.8

## 2024-01-18 NOTE — PROGRESS NOTES
This patient recently took the CARE risk assessment for a mammogram appointment. Based on the patient's responses, NCCN criteria for genetic testing was met.  I spoke to the patient, she would like to proceed with testing. I told her I would contact the provider and if approved, she could have her blood draw at her mammogram appointment.

## 2024-01-23 DIAGNOSIS — Z13.79 GENETIC TESTING: Primary | ICD-10-CM

## 2024-01-25 ENCOUNTER — LAB (OUTPATIENT)
Dept: LAB | Facility: HOSPITAL | Age: 59
End: 2024-01-25
Payer: COMMERCIAL

## 2024-01-25 ENCOUNTER — HOSPITAL ENCOUNTER (OUTPATIENT)
Dept: MAMMOGRAPHY | Facility: HOSPITAL | Age: 59
Discharge: HOME | End: 2024-01-25
Payer: COMMERCIAL

## 2024-01-25 DIAGNOSIS — Z13.79 GENETIC TESTING: ICD-10-CM

## 2024-01-25 DIAGNOSIS — Z12.31 ENCOUNTER FOR SCREENING MAMMOGRAM FOR MALIGNANT NEOPLASM OF BREAST: ICD-10-CM

## 2024-01-25 PROCEDURE — 36415 COLL VENOUS BLD VENIPUNCTURE: CPT

## 2024-01-25 PROCEDURE — 77063 BREAST TOMOSYNTHESIS BI: CPT

## 2024-01-25 PROCEDURE — 77067 SCR MAMMO BI INCL CAD: CPT

## 2024-02-03 LAB
AMBRY INTERPRETATION REPORT: NORMAL
GENE DIS ANL INTERP-IMP: NORMAL

## 2024-02-22 ENCOUNTER — TRANSCRIBE ORDERS (OUTPATIENT)
Dept: ADMINISTRATIVE | Facility: HOSPITAL | Age: 59
End: 2024-02-22
Payer: COMMERCIAL

## 2024-02-22 DIAGNOSIS — M81.0 SENILE OSTEOPOROSIS: Primary | ICD-10-CM

## 2024-03-07 ENCOUNTER — TRANSCRIBE ORDERS (OUTPATIENT)
Dept: ADMINISTRATIVE | Facility: HOSPITAL | Age: 59
End: 2024-03-07
Payer: COMMERCIAL

## 2024-03-07 DIAGNOSIS — M81.0 AGE-RELATED OSTEOPOROSIS WITHOUT CURRENT PATHOLOGICAL FRACTURE: Primary | ICD-10-CM

## 2024-03-26 ENCOUNTER — LAB (OUTPATIENT)
Dept: LAB | Facility: HOSPITAL | Age: 59
End: 2024-03-26
Payer: COMMERCIAL

## 2024-03-26 ENCOUNTER — INFUSION (OUTPATIENT)
Dept: ONCOLOGY | Facility: HOSPITAL | Age: 59
End: 2024-03-26
Payer: COMMERCIAL

## 2024-03-26 VITALS
HEIGHT: 66 IN | WEIGHT: 150.4 LBS | BODY MASS INDEX: 24.17 KG/M2 | HEART RATE: 81 BPM | TEMPERATURE: 98.5 F | SYSTOLIC BLOOD PRESSURE: 108 MMHG | RESPIRATION RATE: 19 BRPM | DIASTOLIC BLOOD PRESSURE: 63 MMHG | OXYGEN SATURATION: 99 %

## 2024-03-26 DIAGNOSIS — M81.0 SENILE OSTEOPOROSIS: ICD-10-CM

## 2024-03-26 DIAGNOSIS — M81.0 AGE-RELATED OSTEOPOROSIS WITHOUT CURRENT PATHOLOGICAL FRACTURE: ICD-10-CM

## 2024-03-26 DIAGNOSIS — M81.0 AGE-RELATED OSTEOPOROSIS WITHOUT CURRENT PATHOLOGICAL FRACTURE: Primary | ICD-10-CM

## 2024-03-26 LAB
25(OH)D3 SERPL-MCNC: 67.9 NG/ML (ref 30–100)
ALBUMIN SERPL-MCNC: 4.4 G/DL (ref 3.5–5.2)
ALBUMIN/GLOB SERPL: 1.6 G/DL
ALP SERPL-CCNC: 75 U/L (ref 39–117)
ALT SERPL W P-5'-P-CCNC: 11 U/L (ref 1–33)
ANION GAP SERPL CALCULATED.3IONS-SCNC: 11 MMOL/L (ref 5–15)
AST SERPL-CCNC: 10 U/L (ref 1–32)
BILIRUB SERPL-MCNC: 0.2 MG/DL (ref 0–1.2)
BUN SERPL-MCNC: 14 MG/DL (ref 6–20)
BUN/CREAT SERPL: 19.4 (ref 7–25)
CALCIUM SPEC-SCNC: 9.3 MG/DL (ref 8.6–10.5)
CHLORIDE SERPL-SCNC: 103 MMOL/L (ref 98–107)
CO2 SERPL-SCNC: 25 MMOL/L (ref 22–29)
CREAT SERPL-MCNC: 0.72 MG/DL (ref 0.57–1)
EGFRCR SERPLBLD CKD-EPI 2021: 97.7 ML/MIN/1.73
GLOBULIN UR ELPH-MCNC: 2.8 GM/DL
GLUCOSE SERPL-MCNC: 107 MG/DL (ref 65–99)
MAGNESIUM SERPL-MCNC: 2.3 MG/DL (ref 1.6–2.6)
PHOSPHATE SERPL-MCNC: 3.5 MG/DL (ref 2.5–4.5)
POTASSIUM SERPL-SCNC: 3.9 MMOL/L (ref 3.5–5.2)
PROT SERPL-MCNC: 7.2 G/DL (ref 6–8.5)
SODIUM SERPL-SCNC: 139 MMOL/L (ref 136–145)

## 2024-03-26 PROCEDURE — 96372 THER/PROPH/DIAG INJ SC/IM: CPT

## 2024-03-26 PROCEDURE — 84100 ASSAY OF PHOSPHORUS: CPT

## 2024-03-26 PROCEDURE — 80053 COMPREHEN METABOLIC PANEL: CPT

## 2024-03-26 PROCEDURE — 82306 VITAMIN D 25 HYDROXY: CPT

## 2024-03-26 PROCEDURE — 83735 ASSAY OF MAGNESIUM: CPT

## 2024-03-26 PROCEDURE — 25010000002 DENOSUMAB 60 MG/ML SOLUTION PREFILLED SYRINGE: Performed by: FAMILY MEDICINE

## 2024-03-26 PROCEDURE — 36415 COLL VENOUS BLD VENIPUNCTURE: CPT

## 2024-03-26 RX ADMIN — DENOSUMAB 60 MG: 60 INJECTION SUBCUTANEOUS at 13:47

## 2024-04-15 ENCOUNTER — TRANSCRIBE ORDERS (OUTPATIENT)
Dept: ADMINISTRATIVE | Facility: HOSPITAL | Age: 59
End: 2024-04-15
Payer: COMMERCIAL

## 2024-04-15 DIAGNOSIS — M81.0 AGE-RELATED OSTEOPOROSIS WITHOUT CURRENT PATHOLOGICAL FRACTURE: Primary | ICD-10-CM

## 2024-10-22 RX ORDER — MESALAMINE 250 MG/1
500 CAPSULE ORAL 2 TIMES DAILY
Qty: 120 CAPSULE | Refills: 1 | Status: SHIPPED | OUTPATIENT
Start: 2024-10-22

## 2024-11-11 ENCOUNTER — OFFICE VISIT (OUTPATIENT)
Dept: GASTROENTEROLOGY | Facility: CLINIC | Age: 59
End: 2024-11-11
Payer: COMMERCIAL

## 2024-11-11 VITALS
WEIGHT: 132 LBS | SYSTOLIC BLOOD PRESSURE: 116 MMHG | HEART RATE: 65 BPM | OXYGEN SATURATION: 98 % | DIASTOLIC BLOOD PRESSURE: 72 MMHG | BODY MASS INDEX: 21.21 KG/M2 | HEIGHT: 66 IN | TEMPERATURE: 97.1 F

## 2024-11-11 DIAGNOSIS — K50.019 CROHN'S DISEASE OF SMALL INTESTINE WITH COMPLICATION: Primary | ICD-10-CM

## 2024-11-11 DIAGNOSIS — K21.9 GASTROESOPHAGEAL REFLUX DISEASE, UNSPECIFIED WHETHER ESOPHAGITIS PRESENT: ICD-10-CM

## 2024-11-11 PROBLEM — K50.00 CROHN'S DISEASE OF SMALL INTESTINE WITHOUT COMPLICATION: Status: ACTIVE | Noted: 2019-04-11

## 2024-11-11 PROCEDURE — 99213 OFFICE O/P EST LOW 20 MIN: CPT | Performed by: INTERNAL MEDICINE

## 2024-11-11 RX ORDER — MESALAMINE 250 MG/1
500 CAPSULE ORAL 2 TIMES DAILY
Qty: 120 CAPSULE | Refills: 11 | Status: SHIPPED | OUTPATIENT
Start: 2024-11-11

## 2024-11-11 NOTE — PROGRESS NOTES
[facility]    Chief Complaint   Patient presents with    Crohn's Disease       Subjective     Crohn's Disease        Maria Victoria Aranda is a 59 y.o. female who presents with a chief complaint of Crohn's    She comes in for an annual evaluation.  She denies any symptoms.  Denies any abdominal pain.  No bladder mucus.  No change in bowel habits.  No arthralgias.  She does have some bruising on her hands she states she bumped some at times.  Nowhere else.  I have things been going well.  She continues on potassium.  She continues to get labs with her PCP.  She believes her B12 was within normal limits last time she was a little low and folic acid took supplements but last time it was checked was back to normal.  Anything else has been going well.      ======== copy September 25, 2023 HPI========  HPI     Maria Victoria Aranda is a 58 y.o. female who presents with a chief complaint of Crohn's.     She comes in for an annual checkup.  Colonoscopy in May of last year revealed no active disease.  She did have a small lipoma.  It was recommended she continue her Pentasa 500 mg twice daily.  She tells me she is taking that medication is tolerating it well.  Denies any symptoms.  She has more constipation than anything.  Does not often.  Denies diarrhea.  No melena or bright red blood per rectum.  No abdominal discomfort.  She did lose some weight but she is gained some of back.     She states she is not on her reflux medications.  She stopped taking it several months ago.  She will rarely have heartburn or indigestion.  If she has anything it is regurgitation.  She will take a Tums and that relieves her symptoms.  No dysphagia.  She just had blood work to include a complete chemistry panel this past week which are reviewed.  Her last CBC was in May which was unremarkable as well.           ============== copy of May 9, 2022 HPI==========  HPI     Maria Victoria Aranda is a 57 y.o. female who presents with a chief complaint of  "Crohn's.     She comes in for her annual visit.  She was here in November and had some nausea.  Upper endoscopy was unremarkable we did check CT enterography which was unremarkable.  She tells me she is doing well at this point.  Denies any abdominal pain.  No nausea.  She does not have any heartburn.  She states she had a little bit of abdominal discomfort 2 weeks ago but at that time her father just passed away she states it was an emotional couple of days.  That has subsided.     She continues on Pentasa 2 tablets twice daily.  She used to be on higher doses of Pentasa.  She is not have any troubles with her Crohn's.  She is never been on more advanced medication better than the Pentasa.  It has been 3 years since her last colonoscopy.     Regarding her heartburn this is under control.  She states she has not really had heartburn in quite a long time.  She continues on Protonix daily.  No breakthrough symptoms.  =============== copy of November 2021 HPI===============================     HISTORY OF PRESENT ILLNESS:      Maria Victoria Aranda is a 56 y.o. female presents with nausea/vomiting for 2 months. She can have nausea with the vomiting. No triggers. Last episode was 2 weeks ago. Emesis is yellow/clear. Takes protonix daily.   No change in appetite. Has had some increased stress \" nothing major.   No new medications.  No fever or chills. No weight loss. No abdominal pain. No headaches.            No recent labs or imaging of the abdomen.            She has crohn's disease. Taking pentasa 2 twice daily. Has 1 bm /day. No constipated.  No rectal bleeding.         Last colonoscopy 5/2019  - Crohn's disease with ileitis. Biopsied.  - Internal hemorrhoids.  - The examination was otherwise normal on direct and retroflexion views.  - Background biopsies were taken from the cecum and rectum.        Final Diagnosis   1.  Gastroesophageal junction, biopsy:       Moderate chronic esophagogastritis.       No intestinal " metaplasia.     2.  Terminal ileum, biopsy:       No evidence of active ileitis.       No adenomatous or dysplastic changes.     3.  Cecum, biopsy:       No evidence of active colitis.       No adenomatous or dysplastic changes.     4.  Rectum, biopsy:       No active colitis.       No adenomatous or dysplastic changes.               Last egd 5/2019   - Z-line variable, at the gastroesophageal junction. Biopsied.  - Normal stomach.  - Normal examined duodenum.        ====================================================  Ov 5-6-21 HPI  Maria Victoria Aranda is a 56 y.o. female who presents with a chief complaint of Crohn's.     She tells me she is doing well.  Denies abdominal pain.  She states she occasionally has loose stools.  Back in winter she had a loose stool maybe once a week.  Now she maybe has loose stool once a month.  Sometimes it is related to what she eats.  She never passed any blood or mucus.  She denies any abdominal cramping.  Her weights been stable.  She is not having significant heartburn.  Denies dysphagia.  She states she is at her baseline.  She has no complaints.  She tells me she just recently had lab work done by her PCP.        ============== May 2020 HPI= 9==========================================  HPI     Maria Victoria Aranda is a 55 y.o. female who presents with a chief complaint of Crohn's     Since she was here last year in April, she underwent endoscopy and colonoscopy in May 2019.  Colonoscopy revealed 1 small aphthous ulcer in the terminal ileum.  She was advised to quit smoking, avoid NSAIDs and continue Pentasa.  Upper endoscopy was unremarkable.  Biopsies of the GE junction were unremarkable.     She tells me she is asymptomatic.  She wants a while have some loose stools.  Usually occurs if she eats the wrong things.  Heartburn is under relatively good control.  She will have some discomfort and she points to her left shoulder at times.  She thinks is more of arthralgias in her shoulder and in  her muscles of her chest.  She does not think she is getting that much reflux.  She denies any blood in her stools.  She is not losing weight of anything she is gained weight she states.  She has no lower abdominal cramps or discomfort.  She continues on Pentasa twice a day.  She also continues on her reflux medications.  She does miss a dose of her PPI then she has breakthrough symptoms.     =============== April 2019 HPI=====================     HISTORY OF PRESENT ILLNESS     Maria Victoria Aranda is a 54 y.o. female presents  with reflux.  Symptoms are mainly of a burning sensation of the chest as well as an aching sensation.  She has had trouble with this intermittently over the last year.  She has been on Dexilant over the last year which does not seem to control her symptoms.  She has tried Prevacid with no help.  Was on Protonix several years ago which did control her symptoms but her insurance would not pay for it.  Certain foods that can trigger spicy and greasy foods.  She does smoke.  States that she is trying to quit.  She is decreasing her caffeine intake as well.    She denies any exertional pain.  No dysphagia.  No nausea vomiting.  No abdominal pain.  No unintentional weight loss.     Has had EGD in the past by Dr. Parada there was question of Drake's in the past per patient history.  Her last upper endoscopy by Dr. Parada was last year.     States that she was diagnosed with Crohn's disease 20 years ago.  Has been followed by Dr. Parada.  Has taken Pentasa over the last 20 years. Has never taken any other meds or steroids for crohns.   She does move her bowels daily.  No rectal bleeding.  No weight loss.  No nausea vomiting.  No abdominal pain.  No skin rash.  No joint pain.  No ocular pain. No history of abdominal surgeries for Crohn's.     She denies personal history of colon polyps or colon cancer.  She has a sister had a precancerous polyp requiring surgery at age 43.  Her mother had colon cancer  at age 48          Past Medical History:   Diagnosis Date    Anxiety     Crohn's disease     Esophagitis determined by endoscopy     Ovarian cyst     PONV (postoperative nausea and vomiting)     Trigger finger     Varicella        Past Surgical History:   Procedure Laterality Date    ANKLE SURGERY Right     APPENDECTOMY      CARPAL TUNNEL RELEASE      CHOLECYSTECTOMY      COLONOSCOPY      COLONOSCOPY N/A 05/01/2019    Procedure: COLONOSCOPY WITH ANESTHESIA;  Surgeon: Sreedhar Ball MD;  Location: [facility] ENDOSCOPY;  Service: Gastroenterology    COLONOSCOPY N/A 05/31/2022    Procedure: COLONOSCOPY;  Surgeon: Seredhar Ball MD;  Location: [facility] ENDOSCOPY;  Service: Gastroenterology;  Laterality: N/A;  pre crohn's  post lipoma; diverticulosis  dr murcia    CYST REMOVAL      CYST REMOVAL      hand    D & C WITH SUCTION  1988    ENDOSCOPY  03/2018    ENDOSCOPY N/A 05/01/2019    Procedure: ESOPHAGOGASTRODUODENOSCOPY WITH ANESTHESIA;  Surgeon: Sreedhar Ball MD;  Location: [facility] ENDOSCOPY;  Service: Gastroenterology    ENDOSCOPY N/A 12/13/2021    Procedure: ESOPHAGOGASTRODUODENOSCOPY WITH ANESTHESIA;  Surgeon: Sreedhar Ball MD;  Location: [facility] ENDOSCOPY;  Service: Gastroenterology;  Laterality: N/A;  pre op: nausea, vomiting  post op:normal  PCP: Paolo Murcia MD    HAND TENDON SURGERY      LAPAROSCOPIC CHOLECYSTECTOMY  2018    MYOMECTOMY ABDOMINAL APPROACH  1984    OOPHORECTOMY      SALPINGECTOMY      TONSILLECTOMY      TOTAL ABDOMINAL HYSTERECTOMY WITH SALPINGO OOPHORECTOMY      RODRICK w/ BSO due to bleeding problems    TRIGGER FINGER RELEASE      x 2    TUBAL ABDOMINAL LIGATION  1991         Current Outpatient Medications:     Denosumab (PROLIA SC), Inject  under the skin into the appropriate area as directed Every 6 (Six) Months., Disp: , Rfl:     DULoxetine (CYMBALTA) 60 MG capsule, Take 1 capsule by mouth Daily., Disp: , Rfl:     ezetimibe (ZETIA) 10 MG tablet, Take 1 tablet by mouth  Daily., Disp: 30 tablet, Rfl: 5    mesalamine (Pentasa) 250 MG CR capsule, Take 2 capsules by mouth 2 (Two) Times a Day., Disp: 120 capsule, Rfl: 11    cyclobenzaprine (FLEXERIL) 5 MG tablet, , Disp: , Rfl:     doxycycline (VIBRAMYICN) 100 MG tablet, Take 1 tablet by mouth Every 12 (Twelve) Hours., Disp: , Rfl:     methocarbamol (ROBAXIN) 750 MG tablet, Take 1 tablet by mouth 4 (Four) Times a Day As Needed for Muscle Spasms., Disp: , Rfl:     nystatin (MYCOSTATIN) 038946 UNIT/GM cream, Apply 1 application  topically to the appropriate area as directed 2 (Two) Times a Day., Disp: 30 g, Rfl: 0    tiZANidine (ZANAFLEX) 2 MG tablet, Take 1 tablet by mouth Every Night., Disp: , Rfl:     vitamin D (ERGOCALCIFEROL) 1.25 MG (17682 UT) capsule capsule, Take 1 capsule by mouth 1 (One) Time Per Week., Disp: , Rfl:     Allergies   Allergen Reactions    Other Swelling     Product containing glucocorticoid    Wasp Venom Itching       Social History     Socioeconomic History    Marital status:    Tobacco Use    Smoking status: Every Day     Current packs/day: 0.50     Average packs/day: 0.5 packs/day for 15.0 years (7.5 ttl pk-yrs)     Types: Cigarettes    Smokeless tobacco: Never   Vaping Use    Vaping status: Never Used   Substance and Sexual Activity    Alcohol use: Yes     Comment: rare    Drug use: No    Sexual activity: Yes     Partners: Male     Birth control/protection: Surgical       Family History   Problem Relation Age of Onset    Hypertension Father     Heart disease Paternal Grandmother     Colon cancer Mother         at age 48    Colon polyps Sister         precancerous polyp requring surgery at age 43.     Breast cancer Neg Hx     Ovarian cancer Neg Hx        Review of Systems      Objective     Vitals:    11/11/24 1446   BP: 116/72   Pulse: 65   Temp: 97.1 °F (36.2 °C)   SpO2: 98%       Physical Exam  Vitals reviewed.   Constitutional:       Appearance: Normal appearance. She is not ill-appearing or  diaphoretic.   Pulmonary:      Effort: Pulmonary effort is normal.   Abdominal:      General: Abdomen is flat. There is no distension.      Palpations: Abdomen is soft. There is no mass.      Tenderness: There is no abdominal tenderness. There is no guarding.   Neurological:      Mental Status: She is alert.   Psychiatric:         Thought Content: Thought content normal.         Judgment: Judgment normal.               Assessment & Plan   Problem List Items Addressed This Visit          Gastrointestinal Abdominal     Gastroesophageal reflux disease    Crohn's disease of small intestine without complication - Primary    Overview     Originally diagnosed by Dr. Parada before the year 2000.  Treated with Pentasa.  Never had surgery or immunosuppressive therapy.  Colonoscopy May 2019 revealed 1 small aphthous ulcer in the TI  CT enterography December 2021, no active Crohn's noted  Colonoscopy May 2022 no active disease.    Surveillance colonoscopy recommended again approximately May 2025              She continues to remain asymptomatic regarding her reflux disease and her Crohn's.  Plan to continue same medication.  Plan to continue on Pentasa for the Crohn's 500 mg twice daily.  She gets her labs with her PCP.  She will be due for surveillance colonoscopy approximately May 2025.  I did ask her to put a reminder in her phone and to contact us to schedule at that time.  I suggest follow-up office visit in 1 year sooner if any signs or symptoms develop.  She is in agreement.    Continue ongoing management by primary care provider and other specialists.     Body mass index is 21.31 kg/m².  Normal      EMR Dragon/transcription disclaimer:  Much of this encounter note is electronic transcription/translation of spoken language to printed text.  The electronic translation of spoken language may be erroneous, or at times, nonsensical words or phrases may be inadvertently transcribed.  Although I have reviewed the note for  such errors, some may still exist.    Sreedhar Ball MD  15:41 CST  11/11/24

## 2025-03-25 ENCOUNTER — TRANSCRIBE ORDERS (OUTPATIENT)
Dept: ADMINISTRATIVE | Facility: HOSPITAL | Age: 60
End: 2025-03-25
Payer: COMMERCIAL

## 2025-03-25 DIAGNOSIS — Z12.31 ENCOUNTER FOR SCREENING MAMMOGRAM FOR MALIGNANT NEOPLASM OF BREAST: Primary | ICD-10-CM

## 2025-05-01 LAB
NCCN CRITERIA FLAG: ABNORMAL
TYRER CUZICK SCORE: 3.2

## 2025-05-02 ENCOUNTER — RESULTS FOLLOW-UP (OUTPATIENT)
Facility: HOSPITAL | Age: 60
End: 2025-05-02
Payer: COMMERCIAL

## 2025-05-02 ENCOUNTER — DOCUMENTATION (OUTPATIENT)
Dept: GENETICS | Facility: HOSPITAL | Age: 60
End: 2025-05-02
Payer: COMMERCIAL

## 2025-05-02 NOTE — PROGRESS NOTES
Meets NCCN Criteria for Genetic Testing  Declines genetic testing? Y   Reason for decline? Previous Testing   If Reason for Decline is Previous Testing    [facility] CARE Y   Non-CARE     Non-CARE Confirmation    Navigator Confirmed?     Patient Reported?     Elevated Tyrer-Cuzick Score ? Or Equal to 20%    Declines referral?     Reason for decline?

## 2025-05-02 NOTE — PROGRESS NOTES
Procedure Date  6/26/23     Impression  Overall Impression: Scattered pan-diverticulosis was present. No gross colitis was seen. A polyp was removed from the descending colon with hot snare polypectomy and a polyp was removed from the IC valve with biopsy. Biopsies were obtained for diarrhea evaluation.     Recommendation    Await pathology results     Repeat colonoscopy in 5 years      High fiber diet  Discharge:  Disp: DC to home. Resume diet. No driving x 24 hours. F/U with PCP as scheduled.  Dx: diarrhea, diverticulosis, two polyps were removed on this exam.    DO NOT DRIVE, OPERATE MACHINERY, OR SIGN LEGAL DOCUMENTS UNTIL TOMORROW. DRINK PLENTY OF FLUIDS. CALL [facility] FOR QUESTIONS/CONCERNS.    This patient recently completed the CARE risk assessment for a mammogram appointment. Based on the patient's responses, NCCN criteria for genetic testing was met. At the time of the assessment, the patient was provided with both written and video educational materials regarding genetic testing.    Navigator follow-up: The patient had [facility] w/ [facility] [employer] genetic testing in  2024. Additional testing is not indicated at this time.

## 2025-05-05 ENCOUNTER — HOSPITAL ENCOUNTER (OUTPATIENT)
Dept: MAMMOGRAPHY | Facility: HOSPITAL | Age: 60
Discharge: HOME | End: 2025-05-05
Admitting: FAMILY MEDICINE
Payer: COMMERCIAL

## 2025-05-05 DIAGNOSIS — Z12.31 ENCOUNTER FOR SCREENING MAMMOGRAM FOR MALIGNANT NEOPLASM OF BREAST: ICD-10-CM

## 2025-05-05 PROCEDURE — 77067 SCR MAMMO BI INCL CAD: CPT

## 2025-05-05 PROCEDURE — 77063 BREAST TOMOSYNTHESIS BI: CPT

## 2025-06-11 ENCOUNTER — TELEPHONE (OUTPATIENT)
Age: 60
End: 2025-06-11

## 2025-06-11 NOTE — TELEPHONE ENCOUNTER
Sarah Davila  69   Right radius fx   No mva no wc no sx   Splint - DOI [id number]  Dr macario is requesting patient to see Dr steinberg

## 2025-06-12 NOTE — PROGRESS NOTES
[facility]  [facility]OPED[facility]  [address]  [facility] [id number] [phone]     Patient: Sarah Davila   YOB: 1965   Date: 6/13/2025     Chief Complaint   Patient presents with    left radius       History of Present Illness  Sarah is a right hand dominant 60 y.o. female who presents today for initial evaluation by our office with reports of left distal radius fracture that occurred on 6/15/2025.  Patient was initially seen at outside facility and x-rays and MRI were obtained at bruising levels.  X-ray of the images of the left wrist obtained at [facility] were reviewed today.  Per my interpretation, possible occult fracture of the distal radius is noted.  MRI images and report were also reviewed today with the left wrist.  Per my interpretation, agree with findings of nondisplaced fracture of the distal left radial metaphysis.  Patient states that she initially obtained a prefabricated splint but that it does not fit and states she has not been wearing it at all times.    Past Medical History:   Diagnosis Date    GERD (gastroesophageal reflux disease)     PONV (postoperative nausea and vomiting)     Ulcerative colitis (HCC)       Past Surgical History:   Procedure Laterality Date    APPENDECTOMY      COLONOSCOPY      ENDOSCOPY, COLON, DIAGNOSTIC      HAND SURGERY Left     HYSTERECTOMY      [place] LAP,CHOLECYSTECTOMY/GRAPH N/A 5/31/2018    CHOLECYSTECTOMY LAPAROSCOPIC performed by Mary Marrufo MD at [facility] OR    TONSILLECTOMY        Social History     Socioeconomic History    Marital status:    Tobacco Use    Smoking status: Every Day     Current packs/day: 1.00     Types: Cigarettes    Smokeless tobacco: Never   Substance and Sexual Activity    Alcohol use: Yes     Comment: occ     Social Drivers of Health     Intimate Partner Violence: Not At Risk (3/26/2024)    Received from [facility]    Abuse Screen

## 2025-06-13 ENCOUNTER — PREP FOR SURGERY (OUTPATIENT)
Dept: GASTROENTEROLOGY | Facility: CLINIC | Age: 60
End: 2025-06-13
Payer: COMMERCIAL

## 2025-06-13 ENCOUNTER — OFFICE VISIT (OUTPATIENT)
Age: 60
End: 2025-06-13
Payer: COMMERCIAL

## 2025-06-13 VITALS — WEIGHT: 136.6 LBS | HEIGHT: 66 IN | BODY MASS INDEX: 21.95 KG/M2

## 2025-06-13 DIAGNOSIS — K50.00 CROHN'S DISEASE OF SMALL INTESTINE WITHOUT COMPLICATION: Primary | ICD-10-CM

## 2025-06-13 DIAGNOSIS — M25.532 LEFT WRIST PAIN: ICD-10-CM

## 2025-06-13 DIAGNOSIS — S52.572A OTHER CLOSED INTRA-ARTICULAR FRACTURE OF DISTAL END OF LEFT RADIUS, INITIAL ENCOUNTER: Primary | ICD-10-CM

## 2025-06-13 PROCEDURE — 99204 OFFICE O/P NEW MOD 45 MIN: CPT | Performed by: NURSE PRACTITIONER

## 2025-06-13 RX ORDER — DENOSUMAB 60 MG/ML
INJECTION SUBCUTANEOUS
COMMUNITY
Start: 2022-10-01

## 2025-06-13 RX ORDER — ERGOCALCIFEROL 1.25 MG/1
50000 CAPSULE, LIQUID FILLED ORAL WEEKLY
COMMUNITY
End: 2025-06-13

## 2025-06-13 RX ORDER — SUCRALFATE 1 G/1
TABLET ORAL
COMMUNITY
End: 2025-06-13

## 2025-06-13 RX ORDER — DULOXETIN HYDROCHLORIDE 60 MG/1
CAPSULE, DELAYED RELEASE ORAL DAILY
COMMUNITY

## 2025-07-08 NOTE — PROGRESS NOTES
[facility]  [facility]  [address]  [facility] [id number] [phone]     Patient: Sarah Davila   YOB: 1965   Date: 7/11/2025     Chief Complaint   Patient presents with    Left wrist         History of Present Illness  Sarah is a right hand dominant 60 y.o. female who presents today for follow-up assessment of left distal radius fracture that occurred on 5/15/2025.  Patient was initially seen at outside facility and x-rays and MRI were obtained.  X-ray of the images of the left wrist obtained at [facility] were reviewed.  Per my interpretation, possible occult fracture of the distal radius is noted.  MRI images and report were also reviewed of  the left wrist.  Per my interpretation, agree with findings of nondisplaced fracture of the distal left radial metaphysis.  At last visit patient was placed in a neutral wrist brace and instructed to be nonweightbearing of the left wrist.  She reports symptoms have greatly improved.    Past Medical History:   Diagnosis Date    GERD (gastroesophageal reflux disease)     PONV (postoperative nausea and vomiting)     Ulcerative colitis (HCC)       Past Surgical History:   Procedure Laterality Date    APPENDECTOMY      COLONOSCOPY      ENDOSCOPY, COLON, DIAGNOSTIC      HAND SURGERY Left     HYSTERECTOMY (CERVIX STATUS UNKNOWN)      [place] LAPS SURG CHOLECYSTECTOMY W/CHOLANGIOGRAPHY N/A 5/31/2018    CHOLECYSTECTOMY LAPAROSCOPIC performed by Mary Marrufo MD at [facility] OR    TONSILLECTOMY        Social History     Socioeconomic History    Marital status:    Tobacco Use    Smoking status: Every Day     Current packs/day: 1.00     Types: Cigarettes    Smokeless tobacco: Never   Substance and Sexual Activity    Alcohol use: Yes     Comment: occ     Social Drivers of Health     Intimate Partner Violence: Not At Risk (3/26/2024)    Received from [facility]    Abuse Screen

## 2025-07-10 ENCOUNTER — HOSPITAL ENCOUNTER (OUTPATIENT)
Facility: HOSPITAL | Age: 60
Setting detail: HOSPITAL OUTPATIENT SURGERY
Discharge: HOME | End: 2025-07-10
Attending: INTERNAL MEDICINE | Admitting: INTERNAL MEDICINE
Payer: COMMERCIAL

## 2025-07-10 ENCOUNTER — ANESTHESIA (OUTPATIENT)
Dept: GASTROENTEROLOGY | Facility: HOSPITAL | Age: 60
End: 2025-07-10
Payer: COMMERCIAL

## 2025-07-10 ENCOUNTER — TELEPHONE (OUTPATIENT)
Dept: GASTROENTEROLOGY | Facility: CLINIC | Age: 60
End: 2025-07-10
Payer: COMMERCIAL

## 2025-07-10 ENCOUNTER — ANESTHESIA EVENT (OUTPATIENT)
Dept: GASTROENTEROLOGY | Facility: HOSPITAL | Age: 60
End: 2025-07-10
Payer: COMMERCIAL

## 2025-07-10 VITALS
WEIGHT: 135 LBS | RESPIRATION RATE: 18 BRPM | HEART RATE: 67 BPM | DIASTOLIC BLOOD PRESSURE: 72 MMHG | SYSTOLIC BLOOD PRESSURE: 107 MMHG | HEIGHT: 66 IN | TEMPERATURE: 97.9 F | BODY MASS INDEX: 21.69 KG/M2 | OXYGEN SATURATION: 100 %

## 2025-07-10 DIAGNOSIS — K50.00 CROHN'S DISEASE OF SMALL INTESTINE WITHOUT COMPLICATION: ICD-10-CM

## 2025-07-10 PROCEDURE — 25010000002 PROPOFOL 10 MG/ML EMULSION

## 2025-07-10 PROCEDURE — 45378 DIAGNOSTIC COLONOSCOPY: CPT | Performed by: INTERNAL MEDICINE

## 2025-07-10 PROCEDURE — 25010000002 LIDOCAINE PF 2% 2 % SOLUTION

## 2025-07-10 PROCEDURE — 25810000003 SODIUM CHLORIDE 0.9 % SOLUTION: Performed by: NURSE ANESTHETIST, CERTIFIED REGISTERED

## 2025-07-10 RX ORDER — SODIUM CHLORIDE 9 MG/ML
500 INJECTION, SOLUTION INTRAVENOUS CONTINUOUS PRN
Status: DISCONTINUED | OUTPATIENT
Start: 2025-07-10 | End: 2025-07-10 | Stop reason: HOSPADM

## 2025-07-10 RX ORDER — PROPOFOL 10 MG/ML
VIAL (ML) INTRAVENOUS AS NEEDED
Status: DISCONTINUED | OUTPATIENT
Start: 2025-07-10 | End: 2025-07-10 | Stop reason: SURG

## 2025-07-10 RX ORDER — LIDOCAINE HYDROCHLORIDE 20 MG/ML
INJECTION, SOLUTION EPIDURAL; INFILTRATION; INTRACAUDAL; PERINEURAL AS NEEDED
Status: DISCONTINUED | OUTPATIENT
Start: 2025-07-10 | End: 2025-07-10 | Stop reason: SURG

## 2025-07-10 RX ORDER — LIDOCAINE HYDROCHLORIDE 10 MG/ML
0.5 INJECTION, SOLUTION EPIDURAL; INFILTRATION; INTRACAUDAL; PERINEURAL ONCE AS NEEDED
Status: DISCONTINUED | OUTPATIENT
Start: 2025-07-10 | End: 2025-07-10 | Stop reason: HOSPADM

## 2025-07-10 RX ORDER — ONDANSETRON 2 MG/ML
4 INJECTION INTRAMUSCULAR; INTRAVENOUS ONCE AS NEEDED
Status: DISCONTINUED | OUTPATIENT
Start: 2025-07-10 | End: 2025-07-10 | Stop reason: HOSPADM

## 2025-07-10 RX ORDER — SODIUM CHLORIDE 0.9 % (FLUSH) 0.9 %
10 SYRINGE (ML) INJECTION AS NEEDED
Status: DISCONTINUED | OUTPATIENT
Start: 2025-07-10 | End: 2025-07-10 | Stop reason: HOSPADM

## 2025-07-10 RX ADMIN — LIDOCAINE HYDROCHLORIDE 100 MG: 20 INJECTION, SOLUTION EPIDURAL; INFILTRATION; INTRACAUDAL; PERINEURAL at 09:18

## 2025-07-10 RX ADMIN — SODIUM CHLORIDE 500 ML: 9 INJECTION, SOLUTION INTRAVENOUS at 07:24

## 2025-07-10 RX ADMIN — PROPOFOL 300 MG: 10 INJECTION, EMULSION INTRAVENOUS at 09:18

## 2025-07-10 NOTE — ANESTHESIA POSTPROCEDURE EVALUATION
"Patient: Maria Victoria Aranda    Procedure Summary       Date: 07/10/25 Room / Location: [facility] ENDOSCOPY 2 / [facility] ENDOSCOPY    Anesthesia Start: 0916 Anesthesia Stop: 0937    Procedure: COLONOSCOPY WITH ANESTHESIA Diagnosis:       Crohn's disease of small intestine without complication      (Crohn's disease of small intestine without complication [K50.00])    Surgeons: Sreedhar Ball MD Provider: Kelvin Hastings CRNA    Anesthesia Type: MAC ASA Status: 2            Anesthesia Type: MAC    Vitals  No vitals data found for the desired time range.          Post Anesthesia Care and Evaluation    Patient location during evaluation: PHASE II  Patient participation: complete - patient participated  Level of consciousness: awake  Pain management: adequate    Airway patency: patent  Anesthetic complications: No anesthetic complications    Cardiovascular status: acceptable  Respiratory status: acceptable  Hydration status: acceptable    Comments: /67 (Patient Position: Sitting)   Pulse 79   Temp 97.9 °F (36.6 °C) (Temporal)   Resp 18   Ht 167.6 cm (66\")   Wt 61.2 kg (135 lb)   SpO2 97%   BMI 21.79 kg/m²       "

## 2025-07-10 NOTE — H&P
[facility] Gastroenterology  Pre Procedure History & Physical    Chief Complaint:   Crohn's    Subjective     HPI:   She has a past history of Crohn's disease.  She is asymptomatic now.  She also has a family history of colon cancer [place] a primary relative.  She has family history of colon polyps involving primary relatives she presents colonoscopy exam    Past Medical History:   Past Medical History:   Diagnosis Date    Anxiety     Crohn's disease     Esophagitis determined by endoscopy     Ovarian cyst     PONV (postoperative nausea and vomiting)     Trigger finger     Varicella        Past Surgical History:  Past Surgical History:   Procedure Laterality Date    ANKLE SURGERY Right     APPENDECTOMY      CARPAL TUNNEL RELEASE      CHOLECYSTECTOMY      COLONOSCOPY      COLONOSCOPY N/A 05/01/2019    Procedure: COLONOSCOPY WITH ANESTHESIA;  Surgeon: Sreedhar Ball MD;  Location: [facility] ENDOSCOPY;  Service: Gastroenterology    COLONOSCOPY N/A 05/31/2022    Procedure: COLONOSCOPY;  Surgeon: Sreedhar Ball MD;  Location: [facility] ENDOSCOPY;  Service: Gastroenterology;  Laterality: N/A;  pre crohn's  post lipoma; diverticulosis  dr murcia    CYST REMOVAL      CYST REMOVAL      hand    D & C WITH SUCTION  1988    ENDOSCOPY  03/2018    ENDOSCOPY N/A 05/01/2019    Procedure: ESOPHAGOGASTRODUODENOSCOPY WITH ANESTHESIA;  Surgeon: Sreedhar Ball MD;  Location: [facility] ENDOSCOPY;  Service: Gastroenterology    ENDOSCOPY N/A 12/13/2021    Procedure: ESOPHAGOGASTRODUODENOSCOPY WITH ANESTHESIA;  Surgeon: Sreedhar Ball MD;  Location: [facility] ENDOSCOPY;  Service: Gastroenterology;  Laterality: N/A;  pre op: nausea, vomiting  post op:normal  PCP: Paolo Murcia MD    HAND TENDON SURGERY      LAPAROSCOPIC CHOLECYSTECTOMY  2018    MYOMECTOMY ABDOMINAL APPROACH  1984    OOPHORECTOMY      SALPINGECTOMY      TONSILLECTOMY      TOTAL ABDOMINAL HYSTERECTOMY WITH SALPINGO OOPHORECTOMY      RODRICK w/ BSO due to bleeding  problems    TRIGGER FINGER RELEASE      x 2    TUBAL ABDOMINAL LIGATION  1991        Family History:  Family History   Problem Relation Age of Onset    Hypertension Father     Heart disease Paternal Grandmother     Colon cancer Mother         at age 48    Colon polyps Sister         precancerous polyp requring surgery at age 43.     Breast cancer Neg Hx     Ovarian cancer Neg Hx        Social History:   reports that she has been smoking cigarettes. She has a 7.5 pack-year smoking history. She has never used smokeless tobacco. She reports current alcohol use. She reports that she does not use drugs.    Medications:   Prior to Admission medications    Medication Sig Start Date End Date Taking? Authorizing Provider   DULoxetine (CYMBALTA) 60 MG capsule Take 1 capsule by mouth Daily.   Yes Tyson Beard MD   ezetimibe (ZETIA) 10 MG tablet Take 1 tablet by mouth Daily. 12/13/19  Yes JANN Hugo MD   mesalamine (Pentasa) 250 MG CR capsule Take 2 capsules by mouth 2 (Two) Times a Day. 11/11/24  Yes Sreedhar Ball MD   Denosumab (PROLIA SC) Inject  under the skin into the appropriate area as directed Every 6 (Six) Months.    Tyson Beard MD   tiZANidine (ZANAFLEX) 2 MG tablet Take 1 tablet by mouth Every Night. 5/17/23   Tyson Beard MD   vitamin D (ERGOCALCIFEROL) 1.25 MG (61670 UT) capsule capsule Take 1 capsule by mouth 1 (One) Time Per Week.    Tyson Beard MD   cyclobenzaprine (FLEXERIL) 5 MG tablet  5/4/23 7/10/25  Tyson Beard MD   doxycycline (VIBRAMYICN) 100 MG tablet Take 1 tablet by mouth Every 12 (Twelve) Hours. 7/27/23 7/10/25  Tyson Beard MD   methocarbamol (ROBAXIN) 750 MG tablet Take 1 tablet by mouth 4 (Four) Times a Day As Needed for Muscle Spasms. 5/2/23 7/10/25  Tyson Beard MD   nystatin (MYCOSTATIN) 209309 UNIT/GM cream Apply 1 application  topically to the appropriate area as directed 2 (Two) Times a Day. 11/16/23 7/10/25   "Melody Maguire APRN   polyethylene glycol (GoLYTELY) 236 g solution As directed 6/16/25 7/10/25  Sreedhar Ball MD       Allergies:  Other and Wasp venom    ROS:    General: Weight stable  Respiratory: No SOA  Cardiovascular: No CP    Objective     Blood pressure 115/67, pulse 79, temperature 97.9 °F (36.6 °C), temperature source Temporal, resp. rate 18, height 167.6 cm (66\"), weight 61.2 kg (135 lb), SpO2 97%, not currently breastfeeding.    Physical Exam   Constitutional: Pt is oriented to person, place, and in no distress.   Cardiovascular: Normal rate, regular rhythm.    Pulmonary/Chest: Effort normal. No respiratory distress.    Abdominal: Nondistended.  Psychiatric: Mood, memory, affect and judgment appear normal.     Assessment & Plan     Diagnosis:  Family history colon cancer and colon polyps    Anticipated Surgical Procedure:  Colonoscopy    The risks, benefits, and alternatives of this procedure have been discussed with the patient or the responsible party- the patient understands and agrees to proceed.    EMR Dragon/transcription disclaimer:  Much of this encounter note is electronic transcription/translation of spoken language to printed text.  The electronic translation of spoken language may be erroneous, or at times, nonsensical words or phrases may be inadvertently transcribed.  Although I have reviewed the note for such errors, some may still exist.  "

## 2025-07-11 ENCOUNTER — OFFICE VISIT (OUTPATIENT)
Age: 60
End: 2025-07-11
Payer: COMMERCIAL

## 2025-07-11 VITALS — HEIGHT: 66 IN | WEIGHT: 139 LBS | BODY MASS INDEX: 22.34 KG/M2

## 2025-07-11 DIAGNOSIS — S52.572D OTHER CLOSED INTRA-ARTICULAR FRACTURE OF DISTAL END OF LEFT RADIUS WITH ROUTINE HEALING, SUBSEQUENT ENCOUNTER: Primary | ICD-10-CM

## 2025-07-11 PROCEDURE — 99213 OFFICE O/P EST LOW 20 MIN: CPT | Performed by: NURSE PRACTITIONER

## (undated) DEVICE — Z INACTIVE USE 2653177 SPONGE GZ W2XL2IN NONWOVEN 4 PLY FASTER WICKING ABIL AVANT

## (undated) DEVICE — THE CHANNEL CLEANING BRUSH IS A NYLON FLEXI BRUSH ATTACHED TO A FLEXIBLE PLASTIC SHEATH DESIGNED TO SAFELY REMOVE DEBRIS FROM FLEXIBLE ENDOSCOPES.

## (undated) DEVICE — CHLORAPREP 26ML ORANGE

## (undated) DEVICE — MAJOR BSIN SETUP PK

## (undated) DEVICE — SUTURE VCRL SZ 3-0 L27IN ABSRB UD L26MM SH 1/2 CIR J416H

## (undated) DEVICE — STANDARD SURGICAL GOWN, L: Brand: CONVERTORS

## (undated) DEVICE — STERILE LATEX POWDER FREE SURGICAL GLOVES WITH HYDROGEL COATING: Brand: PROTEXIS

## (undated) DEVICE — SENSR O2 OXIMAX FNGR A/ 18IN NONSTR

## (undated) DEVICE — Device: Brand: DEFENDO AIR/WATER/SUCTION AND BIOPSY VALVE

## (undated) DEVICE — TROCAR: Brand: KII SHIELDED BLADED ACCESS SYSTEM

## (undated) DEVICE — TBG SMPL FLTR LINE NASL 02/C02 A/ BX/100

## (undated) DEVICE — 9165 UNIVERSAL PATIENT PLATE: Brand: 3M™

## (undated) DEVICE — DEFENDO AIR WATER SUCTION AND BIOPSY VALVE KIT FOR  OLYMPUS: Brand: DEFENDO AIR/WATER/SUCTION AND BIOPSY VALVE

## (undated) DEVICE — PRESSURE SYSTEM DISPOSABLE SUCTION/IRRIGATOR, DUAL SPIKE TUBING: Brand: STRYKEFLOW

## (undated) DEVICE — FRCP BIOP ENDO CAPTURAPRO SPK SERR 2.8MM 230CM

## (undated) DEVICE — CUFF,BP,DISP,1 TUBE,ADULT,HP: Brand: MEDLINE

## (undated) DEVICE — SUTURE VCRL SZ 4-0 L18IN ABSRB UD L19MM PS-2 3/8 CIR PRIM J496H

## (undated) DEVICE — AIRWAY CIRCUIT: Brand: DEROYAL

## (undated) DEVICE — STERILE POLYISOPRENE POWDER-FREE SURGICAL GLOVES: Brand: PROTEXIS

## (undated) DEVICE — INSUFFLATION TUBING,LAPAROSCOPIC: Brand: DEROYAL

## (undated) DEVICE — M/L-10 CLIP CARTRIDGE, 19 CLIPS, DISPOSABLE: Brand: M/L-10

## (undated) DEVICE — CONMED SCOPE SAVER BITE BLOCK, 20X27 MM: Brand: SCOPE SAVER

## (undated) DEVICE — TROCAR ENDOSCP BLADED 5X100 MM

## (undated) DEVICE — LOOP LIG SUT SZ 0 L18IN ABSRB POLYDIOXANONE MFIL PDS II

## (undated) DEVICE — ENCORE® LATEX MICRO SIZE 6.5, STERILE LATEX POWDER-FREE SURGICAL GLOVE: Brand: ENCORE

## (undated) DEVICE — YANKAUER,BULB TIP WITH VENT: Brand: ARGYLE

## (undated) DEVICE — ENDOGATOR AUXILIARY WATER JET CONNECTOR: Brand: ENDOGATOR

## (undated) DEVICE — ARGYLE YANKAUER BULB TIP WITH VENT: Brand: ARGYLE

## (undated) DEVICE — FRCP BX RADJAW4 NDL 2.8 240 STD OG

## (undated) DEVICE — MASK,OXYGEN,MED CONC,ADLT,7' TUB, UC: Brand: PENDING

## (undated) DEVICE — NEEDLE LAP VERRES 12 CM

## (undated) DEVICE — 3M™ STERI-STRIP™ REINFORCED ADHESIVE SKIN CLOSURES, R1546, 1/4 IN X 4 IN (6 MM X 100 MM), 10 STRIPS/ENVELOPE: Brand: 3M™ STERI-STRIP™

## (undated) DEVICE — APPLIER CLP M/L SHFT DIA5MM 15 LIG LIGAMAX 5

## (undated) DEVICE — DRAPE,LAP,CHOLE,W/TROUGHS,STERILE: Brand: MEDLINE

## (undated) DEVICE — ADHESIVE LIQ MASTISOL ST

## (undated) DEVICE — 3M™ TEGADERM™ TRANSPARENT FILM DRESSING FRAME STYLE, 1626, 4 IN X 4-3/4 IN (10 CM X 12 CM), 50/CT 4CT/CASE: Brand: 3M™ TEGADERM™

## (undated) DEVICE — ENCORE® LATEX MICRO SIZE 6, STERILE LATEX POWDER-FREE SURGICAL GLOVE: Brand: ENCORE